# Patient Record
Sex: FEMALE | Race: WHITE | NOT HISPANIC OR LATINO
[De-identification: names, ages, dates, MRNs, and addresses within clinical notes are randomized per-mention and may not be internally consistent; named-entity substitution may affect disease eponyms.]

---

## 2017-01-09 ENCOUNTER — APPOINTMENT (OUTPATIENT)
Dept: OTOLARYNGOLOGY | Facility: CLINIC | Age: 57
End: 2017-01-09

## 2017-01-09 VITALS
WEIGHT: 185 LBS | HEART RATE: 64 BPM | BODY MASS INDEX: 31.58 KG/M2 | DIASTOLIC BLOOD PRESSURE: 90 MMHG | SYSTOLIC BLOOD PRESSURE: 135 MMHG | HEIGHT: 64 IN

## 2017-01-30 ENCOUNTER — APPOINTMENT (OUTPATIENT)
Dept: OTOLARYNGOLOGY | Facility: CLINIC | Age: 57
End: 2017-01-30

## 2017-01-30 VITALS
BODY MASS INDEX: 31.58 KG/M2 | DIASTOLIC BLOOD PRESSURE: 84 MMHG | HEIGHT: 64 IN | HEART RATE: 55 BPM | SYSTOLIC BLOOD PRESSURE: 142 MMHG | WEIGHT: 185 LBS

## 2017-06-12 ENCOUNTER — APPOINTMENT (OUTPATIENT)
Dept: OTOLARYNGOLOGY | Facility: CLINIC | Age: 57
End: 2017-06-12

## 2017-06-12 VITALS
HEART RATE: 69 BPM | BODY MASS INDEX: 31.58 KG/M2 | DIASTOLIC BLOOD PRESSURE: 92 MMHG | SYSTOLIC BLOOD PRESSURE: 144 MMHG | WEIGHT: 185 LBS | HEIGHT: 64 IN

## 2017-06-28 ENCOUNTER — APPOINTMENT (OUTPATIENT)
Dept: OTOLARYNGOLOGY | Facility: CLINIC | Age: 57
End: 2017-06-28

## 2017-06-28 VITALS
HEIGHT: 64 IN | WEIGHT: 185 LBS | HEART RATE: 74 BPM | BODY MASS INDEX: 31.58 KG/M2 | DIASTOLIC BLOOD PRESSURE: 98 MMHG | SYSTOLIC BLOOD PRESSURE: 137 MMHG

## 2017-07-17 ENCOUNTER — APPOINTMENT (OUTPATIENT)
Dept: OTOLARYNGOLOGY | Facility: CLINIC | Age: 57
End: 2017-07-17

## 2017-07-26 ENCOUNTER — APPOINTMENT (OUTPATIENT)
Dept: OTOLARYNGOLOGY | Facility: CLINIC | Age: 57
End: 2017-07-26

## 2017-07-26 VITALS
DIASTOLIC BLOOD PRESSURE: 89 MMHG | HEART RATE: 80 BPM | HEIGHT: 64 IN | WEIGHT: 185 LBS | SYSTOLIC BLOOD PRESSURE: 129 MMHG | BODY MASS INDEX: 31.58 KG/M2

## 2017-07-26 DIAGNOSIS — H81.11 BENIGN PAROXYSMAL VERTIGO, RIGHT EAR: ICD-10-CM

## 2017-07-26 DIAGNOSIS — R51 HEADACHE: ICD-10-CM

## 2017-07-26 DIAGNOSIS — J31.0 CHRONIC RHINITIS: ICD-10-CM

## 2017-08-01 ENCOUNTER — FORM ENCOUNTER (OUTPATIENT)
Age: 57
End: 2017-08-01

## 2017-08-02 ENCOUNTER — RESULT REVIEW (OUTPATIENT)
Age: 57
End: 2017-08-02

## 2017-08-02 ENCOUNTER — APPOINTMENT (OUTPATIENT)
Dept: MRI IMAGING | Facility: CLINIC | Age: 57
End: 2017-08-02

## 2017-08-02 ENCOUNTER — OUTPATIENT (OUTPATIENT)
Dept: OUTPATIENT SERVICES | Facility: HOSPITAL | Age: 57
LOS: 1 days | End: 2017-08-02
Payer: COMMERCIAL

## 2017-08-02 ENCOUNTER — APPOINTMENT (OUTPATIENT)
Dept: CT IMAGING | Facility: CLINIC | Age: 57
End: 2017-08-02
Payer: COMMERCIAL

## 2017-08-02 DIAGNOSIS — Z98.890 OTHER SPECIFIED POSTPROCEDURAL STATES: Chronic | ICD-10-CM

## 2017-08-02 DIAGNOSIS — H70.12 CHRONIC MASTOIDITIS, LEFT EAR: ICD-10-CM

## 2017-08-02 PROCEDURE — 70480 CT ORBIT/EAR/FOSSA W/O DYE: CPT

## 2017-08-02 PROCEDURE — 70551 MRI BRAIN STEM W/O DYE: CPT

## 2017-08-02 PROCEDURE — 70551 MRI BRAIN STEM W/O DYE: CPT | Mod: 26

## 2017-08-02 PROCEDURE — 70480 CT ORBIT/EAR/FOSSA W/O DYE: CPT | Mod: 26

## 2017-08-16 ENCOUNTER — MESSAGE (OUTPATIENT)
Age: 57
End: 2017-08-16

## 2017-08-23 ENCOUNTER — APPOINTMENT (OUTPATIENT)
Dept: OTOLARYNGOLOGY | Facility: CLINIC | Age: 57
End: 2017-08-23
Payer: COMMERCIAL

## 2017-08-23 PROCEDURE — 99214 OFFICE O/P EST MOD 30 MIN: CPT

## 2017-08-27 ENCOUNTER — FORM ENCOUNTER (OUTPATIENT)
Age: 57
End: 2017-08-27

## 2017-08-28 ENCOUNTER — OUTPATIENT (OUTPATIENT)
Dept: OUTPATIENT SERVICES | Facility: HOSPITAL | Age: 57
LOS: 1 days | End: 2017-08-28
Payer: COMMERCIAL

## 2017-08-28 ENCOUNTER — APPOINTMENT (OUTPATIENT)
Dept: MRI IMAGING | Facility: IMAGING CENTER | Age: 57
End: 2017-08-28
Payer: COMMERCIAL

## 2017-08-28 DIAGNOSIS — Z98.890 OTHER SPECIFIED POSTPROCEDURAL STATES: Chronic | ICD-10-CM

## 2017-08-28 DIAGNOSIS — H70.12 CHRONIC MASTOIDITIS, LEFT EAR: ICD-10-CM

## 2017-08-28 DIAGNOSIS — H92.12 OTORRHEA, LEFT EAR: ICD-10-CM

## 2017-08-28 PROCEDURE — 70551 MRI BRAIN STEM W/O DYE: CPT

## 2017-08-28 PROCEDURE — 70551 MRI BRAIN STEM W/O DYE: CPT | Mod: 26

## 2017-09-05 ENCOUNTER — APPOINTMENT (OUTPATIENT)
Dept: OTOLARYNGOLOGY | Facility: CLINIC | Age: 57
End: 2017-09-05
Payer: COMMERCIAL

## 2017-09-05 PROCEDURE — 99214 OFFICE O/P EST MOD 30 MIN: CPT

## 2017-09-11 ENCOUNTER — MESSAGE (OUTPATIENT)
Age: 57
End: 2017-09-11

## 2017-09-11 LAB — BACTERIA FLD CULT: ABNORMAL

## 2017-09-13 ENCOUNTER — APPOINTMENT (OUTPATIENT)
Dept: OTOLARYNGOLOGY | Facility: CLINIC | Age: 57
End: 2017-09-13

## 2017-09-20 ENCOUNTER — APPOINTMENT (OUTPATIENT)
Dept: OTOLARYNGOLOGY | Facility: CLINIC | Age: 57
End: 2017-09-20

## 2017-09-20 ENCOUNTER — APPOINTMENT (OUTPATIENT)
Dept: OTOLARYNGOLOGY | Facility: CLINIC | Age: 57
End: 2017-09-20
Payer: COMMERCIAL

## 2017-09-20 PROCEDURE — 99214 OFFICE O/P EST MOD 30 MIN: CPT | Mod: 25

## 2017-09-20 PROCEDURE — 92557 COMPREHENSIVE HEARING TEST: CPT

## 2017-09-20 PROCEDURE — 92567 TYMPANOMETRY: CPT

## 2017-09-27 ENCOUNTER — MESSAGE (OUTPATIENT)
Age: 57
End: 2017-09-27

## 2017-10-02 ENCOUNTER — OUTPATIENT (OUTPATIENT)
Dept: OUTPATIENT SERVICES | Facility: HOSPITAL | Age: 57
LOS: 1 days | End: 2017-10-02
Payer: COMMERCIAL

## 2017-10-02 VITALS
HEART RATE: 72 BPM | SYSTOLIC BLOOD PRESSURE: 130 MMHG | TEMPERATURE: 98 F | WEIGHT: 195.99 LBS | DIASTOLIC BLOOD PRESSURE: 86 MMHG | HEIGHT: 60 IN | RESPIRATION RATE: 16 BRPM

## 2017-10-02 DIAGNOSIS — Z98.890 OTHER SPECIFIED POSTPROCEDURAL STATES: Chronic | ICD-10-CM

## 2017-10-02 DIAGNOSIS — H92.12 OTORRHEA, LEFT EAR: ICD-10-CM

## 2017-10-02 DIAGNOSIS — I10 ESSENTIAL (PRIMARY) HYPERTENSION: ICD-10-CM

## 2017-10-02 DIAGNOSIS — E66.9 OBESITY, UNSPECIFIED: ICD-10-CM

## 2017-10-02 LAB
BUN SERPL-MCNC: 15 MG/DL — SIGNIFICANT CHANGE UP (ref 7–23)
CALCIUM SERPL-MCNC: 9.3 MG/DL — SIGNIFICANT CHANGE UP (ref 8.4–10.5)
CHLORIDE SERPL-SCNC: 101 MMOL/L — SIGNIFICANT CHANGE UP (ref 98–107)
CO2 SERPL-SCNC: 28 MMOL/L — SIGNIFICANT CHANGE UP (ref 22–31)
CREAT SERPL-MCNC: 0.75 MG/DL — SIGNIFICANT CHANGE UP (ref 0.5–1.3)
GLUCOSE SERPL-MCNC: 75 MG/DL — SIGNIFICANT CHANGE UP (ref 70–99)
HCT VFR BLD CALC: 42.7 % — SIGNIFICANT CHANGE UP (ref 34.5–45)
HGB BLD-MCNC: 13.8 G/DL — SIGNIFICANT CHANGE UP (ref 11.5–15.5)
MCHC RBC-ENTMCNC: 28.8 PG — SIGNIFICANT CHANGE UP (ref 27–34)
MCHC RBC-ENTMCNC: 32.3 % — SIGNIFICANT CHANGE UP (ref 32–36)
MCV RBC AUTO: 89 FL — SIGNIFICANT CHANGE UP (ref 80–100)
NRBC # FLD: 0 — SIGNIFICANT CHANGE UP
PLATELET # BLD AUTO: 253 K/UL — SIGNIFICANT CHANGE UP (ref 150–400)
PMV BLD: 10.1 FL — SIGNIFICANT CHANGE UP (ref 7–13)
POTASSIUM SERPL-MCNC: 3.6 MMOL/L — SIGNIFICANT CHANGE UP (ref 3.5–5.3)
POTASSIUM SERPL-SCNC: 3.6 MMOL/L — SIGNIFICANT CHANGE UP (ref 3.5–5.3)
RBC # BLD: 4.8 M/UL — SIGNIFICANT CHANGE UP (ref 3.8–5.2)
RBC # FLD: 13.1 % — SIGNIFICANT CHANGE UP (ref 10.3–14.5)
SODIUM SERPL-SCNC: 143 MMOL/L — SIGNIFICANT CHANGE UP (ref 135–145)
WBC # BLD: 7.12 K/UL — SIGNIFICANT CHANGE UP (ref 3.8–10.5)
WBC # FLD AUTO: 7.12 K/UL — SIGNIFICANT CHANGE UP (ref 3.8–10.5)

## 2017-10-02 PROCEDURE — 93010 ELECTROCARDIOGRAM REPORT: CPT

## 2017-10-02 RX ORDER — SODIUM CHLORIDE 9 MG/ML
1000 INJECTION, SOLUTION INTRAVENOUS
Qty: 0 | Refills: 0 | Status: DISCONTINUED | OUTPATIENT
Start: 2017-10-19 | End: 2017-10-27

## 2017-10-02 RX ORDER — SODIUM CHLORIDE 9 MG/ML
3 INJECTION INTRAMUSCULAR; INTRAVENOUS; SUBCUTANEOUS EVERY 8 HOURS
Qty: 0 | Refills: 0 | Status: DISCONTINUED | OUTPATIENT
Start: 2017-10-19 | End: 2017-10-27

## 2017-10-02 NOTE — H&P PST ADULT - PROBLEM SELECTOR PLAN 1
Left Myringotomy & Tube scheduled for 10/19/2017.  Pre-op instructions given. Pt verbalized understanding.  Pepcid given for GI prophylaxis.  Abnormal EKG - comparison EKG on file.  Medical clearance requested - including copy of Echo/Stress if available

## 2017-10-02 NOTE — H&P PST ADULT - HISTORY OF PRESENT ILLNESS
56yo female with medical h/o HTN and Cholesteatoma, left ear and had surgery 11 years ago. Pt reports over the period of 3-4 years she have been experiencing discharge from left ear. Pt presents today for presurgical evaluation for Left Myringotomy & Tube scheduled for 10/19/2017. 56yo female with medical h/o HTN with prior h/o Cholesteatoma, left ear and had surgery 11 years ago. Pt reports over the period of 3-4 years she have been experiencing discharge from left ear. Pt presents today for presurgical evaluation for Left Myringotomy & Tube scheduled for 10/19/2017.

## 2017-10-02 NOTE — H&P PST ADULT - FAMILY HISTORY
Mother  Still living? Yes, Estimated age: 81-90  Family history of hypertension, Age at diagnosis: Age Unknown

## 2017-10-02 NOTE — H&P PST ADULT - MUSCULOSKELETAL
negative detailed exam no joint warmth/normal strength/ROM intact/no calf tenderness/no joint swelling/no joint erythema

## 2017-10-02 NOTE — H&P PST ADULT - NEGATIVE ENMT SYMPTOMS
no vertigo/no sinus symptoms/no nasal congestion/no tinnitus/no nasal obstruction/no post-nasal discharge/no nasal discharge

## 2017-10-02 NOTE — H&P PST ADULT - PMH
Chronic serous otitis media    Hypercholesteremia    Hypertension    Obesity    Otorrhea of left ear

## 2017-10-02 NOTE — H&P PST ADULT - ABILITY TO HEAR (WITH HEARING AID OR HEARING APPLIANCE IF NORMALLY USED):
50 % hearing loss in left ear/Mildly to Moderately Impaired: difficulty hearing in some environments or speaker may need to increase volume or speak distinctly

## 2017-10-02 NOTE — H&P PST ADULT - LYMPHATIC
posterior cervical L/supraclavicular R/anterior cervical L/posterior cervical R/anterior cervical R/supraclavicular L

## 2017-10-02 NOTE — H&P PST ADULT - ENMT COMMENTS
left hearing difficulty and drainage from left ear left ear, hearing difficulty and discharge from left ear

## 2017-10-02 NOTE — H&P PST ADULT - RS GEN PE MLT RESP DETAILS PC
airway patent/respirations non-labored/good air movement/breath sounds equal/clear to auscultation bilaterally

## 2017-10-02 NOTE — H&P PST ADULT - NSANTHOSAYNRD_GEN_A_CORE
No. GREG screening performed.  STOP BANG Legend: 0-2 = LOW Risk; 3-4 = INTERMEDIATE Risk; 5-8 = HIGH Risk

## 2017-10-10 RX ORDER — METHYLPREDNISOLONE 4 MG/1
4 TABLET ORAL
Qty: 1 | Refills: 0 | Status: COMPLETED | COMMUNITY
Start: 2017-06-28 | End: 2017-10-10

## 2017-10-11 NOTE — ASU PATIENT PROFILE, ADULT - ABILITY TO HEAR (WITH HEARING AID OR HEARING APPLIANCE IF NORMALLY USED):
-s/p Impella-assisted Cath with BABAR x5 to LAD/RCA  -c/w DAPT, Statin, and BetaBlocker  -holding ACEI given LIN/CKD, discuss with Renal  - cards following 50 % hearing loss in left ear/Mildly to Moderately Impaired: difficulty hearing in some environments or speaker may need to increase volume or speak distinctly

## 2017-10-11 NOTE — ASU PATIENT PROFILE, ADULT - PSH
H/O myringotomy  s/p excision of osteoma, left myringotomy tube (2006)  H/O nasal polypectomy  2013 H/O myringotomy  s/p excision of osteoma, left myringotomy tube (2006)  H/O nasal polypectomy  2013  History of appendectomy  1976

## 2017-10-12 ENCOUNTER — OUTPATIENT (OUTPATIENT)
Dept: OUTPATIENT SERVICES | Facility: HOSPITAL | Age: 57
LOS: 1 days | Discharge: HOME | End: 2017-10-12
Payer: COMMERCIAL

## 2017-10-12 ENCOUNTER — RESULT REVIEW (OUTPATIENT)
Age: 57
End: 2017-10-12

## 2017-10-12 ENCOUNTER — TRANSCRIPTION ENCOUNTER (OUTPATIENT)
Age: 57
End: 2017-10-12

## 2017-10-12 ENCOUNTER — APPOINTMENT (OUTPATIENT)
Dept: OTOLARYNGOLOGY | Facility: HOSPITAL | Age: 57
End: 2017-10-12

## 2017-10-12 VITALS
RESPIRATION RATE: 16 BRPM | HEART RATE: 76 BPM | DIASTOLIC BLOOD PRESSURE: 63 MMHG | OXYGEN SATURATION: 100 % | SYSTOLIC BLOOD PRESSURE: 108 MMHG | TEMPERATURE: 98 F

## 2017-10-12 VITALS
HEIGHT: 60 IN | RESPIRATION RATE: 16 BRPM | TEMPERATURE: 98 F | WEIGHT: 195.99 LBS | HEART RATE: 61 BPM | DIASTOLIC BLOOD PRESSURE: 90 MMHG | SYSTOLIC BLOOD PRESSURE: 147 MMHG | OXYGEN SATURATION: 97 %

## 2017-10-12 DIAGNOSIS — H92.12 OTORRHEA, LEFT EAR: ICD-10-CM

## 2017-10-12 DIAGNOSIS — Z98.890 OTHER SPECIFIED POSTPROCEDURAL STATES: Chronic | ICD-10-CM

## 2017-10-12 DIAGNOSIS — Z90.49 ACQUIRED ABSENCE OF OTHER SPECIFIED PARTS OF DIGESTIVE TRACT: Chronic | ICD-10-CM

## 2017-10-12 LAB
GRAM STN WND: SIGNIFICANT CHANGE UP
SPECIMEN SOURCE: SIGNIFICANT CHANGE UP

## 2017-10-12 PROCEDURE — 69436 CREATE EARDRUM OPENING: CPT | Mod: 59,LT

## 2017-10-12 PROCEDURE — 88311 DECALCIFY TISSUE: CPT | Mod: 26

## 2017-10-12 PROCEDURE — 88305 TISSUE EXAM BY PATHOLOGIST: CPT | Mod: 26

## 2017-10-12 PROCEDURE — 69641 REVISE MIDDLE EAR & MASTOID: CPT | Mod: LT

## 2017-10-12 PROCEDURE — 88304 TISSUE EXAM BY PATHOLOGIST: CPT | Mod: 26

## 2017-10-12 PROCEDURE — 11402 EXC TR-EXT B9+MARG 1.1-2 CM: CPT | Mod: RT

## 2017-10-12 PROCEDURE — 92516 FACIAL NERVE FUNCTION TEST: CPT

## 2017-10-12 RX ORDER — ONDANSETRON 8 MG/1
4 TABLET, FILM COATED ORAL ONCE
Qty: 0 | Refills: 0 | Status: COMPLETED | OUTPATIENT
Start: 2017-10-12 | End: 2017-10-12

## 2017-10-12 RX ORDER — OXYCODONE AND ACETAMINOPHEN 5; 325 MG/1; MG/1
1 TABLET ORAL EVERY 6 HOURS
Qty: 0 | Refills: 0 | Status: DISCONTINUED | OUTPATIENT
Start: 2017-10-12 | End: 2017-10-12

## 2017-10-12 RX ORDER — ACETAMINOPHEN 500 MG
1000 TABLET ORAL ONCE
Qty: 0 | Refills: 0 | Status: COMPLETED | OUTPATIENT
Start: 2017-10-12 | End: 2017-10-12

## 2017-10-12 RX ORDER — FENTANYL CITRATE 50 UG/ML
25 INJECTION INTRAVENOUS
Qty: 0 | Refills: 0 | Status: DISCONTINUED | OUTPATIENT
Start: 2017-10-12 | End: 2017-10-12

## 2017-10-12 RX ORDER — SODIUM CHLORIDE 9 MG/ML
500 INJECTION, SOLUTION INTRAVENOUS
Qty: 0 | Refills: 0 | Status: DISCONTINUED | OUTPATIENT
Start: 2017-10-12 | End: 2017-10-27

## 2017-10-12 RX ADMIN — Medication 1000 MILLIGRAM(S): at 12:00

## 2017-10-12 RX ADMIN — Medication 400 MILLIGRAM(S): at 11:39

## 2017-10-12 RX ADMIN — FENTANYL CITRATE 25 MICROGRAM(S): 50 INJECTION INTRAVENOUS at 12:00

## 2017-10-12 RX ADMIN — FENTANYL CITRATE 25 MICROGRAM(S): 50 INJECTION INTRAVENOUS at 11:44

## 2017-10-12 RX ADMIN — SODIUM CHLORIDE 70 MILLILITER(S): 9 INJECTION, SOLUTION INTRAVENOUS at 11:40

## 2017-10-12 RX ADMIN — FENTANYL CITRATE 25 MICROGRAM(S): 50 INJECTION INTRAVENOUS at 12:15

## 2017-10-12 RX ADMIN — ONDANSETRON 4 MILLIGRAM(S): 8 TABLET, FILM COATED ORAL at 11:44

## 2017-10-12 RX ADMIN — FENTANYL CITRATE 25 MICROGRAM(S): 50 INJECTION INTRAVENOUS at 12:05

## 2017-10-12 NOTE — ASU DISCHARGE PLAN (ADULT/PEDIATRIC). - CONDITIONS AT DISCHARGE
Verbalizing good understanding of discharge instructions and medication review.Discharge criteria met.  Cleared for discharge home by anesthesia.  Escorted to entrance  discharged home. patient voided.

## 2017-10-12 NOTE — ASU DISCHARGE PLAN (ADULT/PEDIATRIC). - POST OP PHONE #
[phone]// [phone] pt. granted permission to leave message /and or speak with whoever answers the phone.

## 2017-10-12 NOTE — ASU DISCHARGE PLAN (ADULT/PEDIATRIC). - MEDICATION SUMMARY - MEDICATIONS TO TAKE
I will START or STAY ON the medications listed below when I get home from the hospital:    Percocet 5/325 oral tablet  -- 1 tab(s) by mouth every 6 hours MDD:4  -- Caution federal law prohibits the transfer of this drug to any person other  than the person for whom it was prescribed.  May cause drowsiness.  Alcohol may intensify this effect.  Use care when operating dangerous machinery.  This prescription cannot be refilled.  This product contains acetaminophen.  Do not use  with any other product containing acetaminophen to prevent possible liver damage.  Using more of this medication than prescribed may cause serious breathing problems.    -- Indication: For Post-op pain    amLODIPine 5 mg oral tablet  -- 1 tab(s) by mouth once a day in morning  -- Indication: For Home med    Cleocin HCl 300 mg oral capsule  -- 1 cap(s) by mouth 3 times a day   -- Finish all this medication unless otherwise directed by prescriber.  Medication should be taken with plenty of water.    -- Indication: For Post-op abx I will START or STAY ON the medications listed below when I get home from the hospital:    Percocet 5/325 oral tablet  -- 1 tab(s) by mouth every 6 hours MDD:4  -- Caution federal law prohibits the transfer of this drug to any person other  than the person for whom it was prescribed.  May cause drowsiness.  Alcohol may intensify this effect.  Use care when operating dangerous machinery.  This prescription cannot be refilled.  This product contains acetaminophen.  Do not use  with any other product containing acetaminophen to prevent possible liver damage.  Using more of this medication than prescribed may cause serious breathing problems.    -- Indication: For pain    amLODIPine 5 mg oral tablet  -- 1 tab(s) by mouth once a day in morning  -- Indication: For Home med    Cleocin HCl 300 mg oral capsule  -- 1 cap(s) by mouth 3 times a day   -- Finish all this medication unless otherwise directed by prescriber.  Medication should be taken with plenty of water.    -- Indication: For antibiotic

## 2017-10-13 LAB
SPECIMEN SOURCE: SIGNIFICANT CHANGE UP

## 2017-10-15 LAB — CULTURE - SURGICAL SITE: SIGNIFICANT CHANGE UP

## 2017-10-16 LAB — CULTURE - SURGICAL SITE: SIGNIFICANT CHANGE UP

## 2017-10-18 ENCOUNTER — APPOINTMENT (OUTPATIENT)
Dept: OTOLARYNGOLOGY | Facility: CLINIC | Age: 57
End: 2017-10-18
Payer: COMMERCIAL

## 2017-10-18 LAB
CULTURE - SURGICAL SITE: SIGNIFICANT CHANGE UP
SURGICAL PATHOLOGY STUDY: SIGNIFICANT CHANGE UP

## 2017-10-18 PROCEDURE — 99024 POSTOP FOLLOW-UP VISIT: CPT

## 2017-11-03 ENCOUNTER — MEDICATION RENEWAL (OUTPATIENT)
Age: 57
End: 2017-11-03

## 2017-11-08 ENCOUNTER — APPOINTMENT (OUTPATIENT)
Dept: OTOLARYNGOLOGY | Facility: CLINIC | Age: 57
End: 2017-11-08
Payer: COMMERCIAL

## 2017-11-08 VITALS
DIASTOLIC BLOOD PRESSURE: 83 MMHG | SYSTOLIC BLOOD PRESSURE: 121 MMHG | BODY MASS INDEX: 32.44 KG/M2 | HEART RATE: 77 BPM | WEIGHT: 190 LBS | HEIGHT: 64 IN

## 2017-11-08 PROCEDURE — 99024 POSTOP FOLLOW-UP VISIT: CPT

## 2017-11-09 LAB
FUNGUS SPEC QL CULT: SIGNIFICANT CHANGE UP

## 2017-11-14 ENCOUNTER — RESULT REVIEW (OUTPATIENT)
Age: 57
End: 2017-11-14

## 2017-11-14 LAB — BACTERIA FLD CULT: NORMAL

## 2017-11-20 ENCOUNTER — APPOINTMENT (OUTPATIENT)
Dept: OTOLARYNGOLOGY | Facility: CLINIC | Age: 57
End: 2017-11-20

## 2017-12-11 ENCOUNTER — APPOINTMENT (OUTPATIENT)
Dept: OTOLARYNGOLOGY | Facility: CLINIC | Age: 57
End: 2017-12-11
Payer: COMMERCIAL

## 2017-12-11 VITALS — HEIGHT: 64 IN | BODY MASS INDEX: 32.44 KG/M2 | WEIGHT: 190 LBS

## 2017-12-11 PROCEDURE — 99024 POSTOP FOLLOW-UP VISIT: CPT

## 2017-12-11 RX ORDER — CIPROFLOXACIN AND DEXAMETHASONE 3; 1 MG/ML; MG/ML
0.3-0.1 SUSPENSION/ DROPS AURICULAR (OTIC)
Qty: 1 | Refills: 4 | Status: DISCONTINUED | COMMUNITY
Start: 2017-10-18 | End: 2017-12-11

## 2017-12-11 RX ORDER — OFLOXACIN OTIC 3 MG/ML
0.3 SOLUTION AURICULAR (OTIC)
Qty: 10 | Refills: 0 | Status: DISCONTINUED | COMMUNITY
Start: 2016-09-01 | End: 2017-12-11

## 2017-12-19 ENCOUNTER — MEDICATION RENEWAL (OUTPATIENT)
Age: 57
End: 2017-12-19

## 2017-12-19 LAB — BACTERIA FLD CULT: NORMAL

## 2018-01-08 ENCOUNTER — APPOINTMENT (OUTPATIENT)
Dept: OTOLARYNGOLOGY | Facility: CLINIC | Age: 58
End: 2018-01-08
Payer: COMMERCIAL

## 2018-01-08 VITALS
HEIGHT: 62 IN | DIASTOLIC BLOOD PRESSURE: 82 MMHG | HEART RATE: 56 BPM | BODY MASS INDEX: 34.96 KG/M2 | SYSTOLIC BLOOD PRESSURE: 143 MMHG | WEIGHT: 190 LBS

## 2018-01-08 PROCEDURE — 92567 TYMPANOMETRY: CPT

## 2018-01-08 PROCEDURE — 92557 COMPREHENSIVE HEARING TEST: CPT

## 2018-01-08 PROCEDURE — 99024 POSTOP FOLLOW-UP VISIT: CPT

## 2018-01-31 LAB — ACID FAST STN FLD: NORMAL

## 2018-04-16 ENCOUNTER — APPOINTMENT (OUTPATIENT)
Dept: OTOLARYNGOLOGY | Facility: CLINIC | Age: 58
End: 2018-04-16
Payer: COMMERCIAL

## 2018-04-16 VITALS
SYSTOLIC BLOOD PRESSURE: 116 MMHG | HEART RATE: 57 BPM | BODY MASS INDEX: 33.66 KG/M2 | WEIGHT: 190 LBS | DIASTOLIC BLOOD PRESSURE: 64 MMHG | HEIGHT: 63 IN

## 2018-04-16 PROCEDURE — 99213 OFFICE O/P EST LOW 20 MIN: CPT

## 2018-04-16 RX ORDER — FLUTICASONE PROPIONATE 50 UG/1
50 SPRAY, METERED NASAL DAILY
Qty: 1 | Refills: 5 | Status: DISCONTINUED | COMMUNITY
Start: 2017-07-26 | End: 2018-04-16

## 2018-04-18 ENCOUNTER — EMERGENCY (EMERGENCY)
Facility: HOSPITAL | Age: 58
LOS: 1 days | Discharge: HOME | End: 2018-04-18
Attending: EMERGENCY MEDICINE
Payer: COMMERCIAL

## 2018-04-18 VITALS
DIASTOLIC BLOOD PRESSURE: 84 MMHG | RESPIRATION RATE: 17 BRPM | TEMPERATURE: 98 F | SYSTOLIC BLOOD PRESSURE: 145 MMHG | HEART RATE: 55 BPM | OXYGEN SATURATION: 97 %

## 2018-04-18 VITALS — WEIGHT: 190.04 LBS

## 2018-04-18 DIAGNOSIS — Z90.49 ACQUIRED ABSENCE OF OTHER SPECIFIED PARTS OF DIGESTIVE TRACT: Chronic | ICD-10-CM

## 2018-04-18 DIAGNOSIS — Z98.890 OTHER SPECIFIED POSTPROCEDURAL STATES: Chronic | ICD-10-CM

## 2018-04-18 PROCEDURE — 99283 EMERGENCY DEPT VISIT LOW MDM: CPT | Mod: 25

## 2018-04-18 PROCEDURE — 73130 X-RAY EXAM OF HAND: CPT | Mod: 26,RT

## 2018-04-18 PROCEDURE — 90715 TDAP VACCINE 7 YRS/> IM: CPT

## 2018-04-18 PROCEDURE — 99283 EMERGENCY DEPT VISIT LOW MDM: CPT

## 2018-04-18 PROCEDURE — 73130 X-RAY EXAM OF HAND: CPT

## 2018-04-18 PROCEDURE — 90471 IMMUNIZATION ADMIN: CPT

## 2018-04-18 RX ORDER — IBUPROFEN 200 MG
600 TABLET ORAL ONCE
Qty: 0 | Refills: 0 | Status: COMPLETED | OUTPATIENT
Start: 2018-04-18 | End: 2018-04-18

## 2018-04-18 RX ORDER — TETANUS TOXOID, REDUCED DIPHTHERIA TOXOID AND ACELLULAR PERTUSSIS VACCINE, ADSORBED 5; 2.5; 8; 8; 2.5 [IU]/.5ML; [IU]/.5ML; UG/.5ML; UG/.5ML; UG/.5ML
0.5 SUSPENSION INTRAMUSCULAR ONCE
Qty: 0 | Refills: 0 | Status: COMPLETED | OUTPATIENT
Start: 2018-04-18 | End: 2018-04-18

## 2018-04-18 RX ADMIN — Medication 600 MILLIGRAM(S): at 22:37

## 2018-04-18 RX ADMIN — TETANUS TOXOID, REDUCED DIPHTHERIA TOXOID AND ACELLULAR PERTUSSIS VACCINE, ADSORBED 0.5 MILLILITER(S): 5; 2.5; 8; 8; 2.5 SUSPENSION INTRAMUSCULAR at 22:36

## 2018-04-18 NOTE — ED ADULT NURSE NOTE - OBJECTIVE STATEMENT
57 y/o female presenting to ED c/o finger pain. Pt staes she cut her hand on glass on 4/15, and was seen in [facility] and started on bactrim. Pt found today to still have glass in finger. Finger is swollen, and painful to touch. Safety and comfort measures maintained.

## 2018-04-18 NOTE — ED PROVIDER NOTE - CONDUCTED A DETAILED DISCUSSION WITH PATIENT AND/OR GUARDIAN REGARDING, MDM
return to ED if symptoms worsen, persist or questions arise/need for outpatient follow-up return to ED if symptoms worsen, persist or questions arise/radiology results/need for outpatient follow-up

## 2018-04-18 NOTE — ED PROVIDER NOTE - SKIN, MLM
Skin normal color for race, warm, dry. Delayed healing lacs to distal 5th, flexor aspect of distal 4th, and superficial linear lacs to palm. No cellulitis. No purulence. No fluctuance. Compartments soft.

## 2018-04-18 NOTE — ED ADULT NURSE NOTE - PSH
H/O myringotomy  s/p excision of osteoma, left myringotomy tube (2006)  H/O nasal polypectomy  2013  History of appendectomy  1976

## 2018-04-18 NOTE — ED PROVIDER NOTE - MUSCULOSKELETAL, MLM
+TTP right distal 4th digit with swelling. FROM all joints of hand. +TTP right distal 4th digit with minimal swelling. no erythema/warmth to touch, or discharge. FROM all joints of hand.

## 2018-04-18 NOTE — ED PROVIDER NOTE - ATTENDING CONTRIBUTION TO CARE
57 yo F presents with pain to her R 4th finger. patient had glass break on her fingers, she sustained msall laceration to the finger pads of digits 2, 3, 4, and 5. she let these heal by secondary intention. she continued to feel pain in the 4th finger. went to urgent care yesterday who told her she had a 5mm piece of glass stuck ni finger 57 yo F presents with pain to her R 4th finger. patient had glass break on her fingers, she sustained small laceration to the finger pads of digits 2, 3, 4, and 5, four days ago. she let these heal by secondary intention. she continued to feel pain in the 4th finger. went to urgent care yesterday who told her she had a 5mm piece of glass stuck ni finger. she comes to get the piece of glass out of her finger.   PE with small (1cm) lacerations to the dorsal aspects of R hand fingers 2-5. these lacs all appear to be healing. 5th finger laceration irregularly healed. no evidence of infection, with no erythema, warmth to touch, or discharge. R 4th finger laceration has minimal swelling at the area, with TTP. there is no evidence of superinfection. full rom of all digits at joints.  ed workup shows xray demonstrating 5mm curvilinear denisty in R 4th finger, corresponding to where pt complains of pain and where she is TTP.   plastics Hand was consulted, Dr. bonner.   while awaiting call back from plastics, patient decided that she did not want to wait anymore and wanted to be discharged from the hospital. although we urged her to stay until plastics called back +/- came to the Er to remove the FB, she refused to stay any  longer as she was tired and wanted to go home to sleep. patient is AAOx4, full capacity to make all medical decisions, and understands that if she leaves there is a risk of severe disability and death. we instructed patient upon discharge to call plastic's office first thing in the morning to schedule an appointment in the office tomorrow to have the fb removed, and to continue her bactrim in the meantime.   as soon as patient was discharged, plastics Dr. Bonner returned phone call. we gave him pt info and he said that he would see patient in the morning in the office for further management      The patient was discharged from the ED in stable condition. All results of today's workup were discussed with the patient and all questions/concerns were addressed. All discharge instructions were thoroughly discussed with the patient, as well as important warning signs and new/ worsening symptoms which should necessitate patient's immediate return to the ED. The patient is agreeable with discharge and expresses full understanding of all instructions given.

## 2018-04-18 NOTE — ED PROVIDER NOTE - OBJECTIVE STATEMENT
59yo F pmh HLD, HTN presents for evaluation of retained FB in left ring finger s/p lac by broken glass 4/15. Evaluated at [facility] that day, irrigated and bacitracin applied but no xr or lac repair. Initiated on Bactrim. Today, pt sent by PMD to radiology with XR showing linear 5mm retained FB. Increased pain at site with numbness to distal digit with swelling. No fever. Right hand dominant. Tdap not up to date. 59yo F pmh HLD, HTN presents for evaluation of retained FB in left ring finger s/p lac by broken glass 4/15. Evaluated at [facility] [place] that day, irrigated and bacitracin applied but no xr or lac repair. Initiated on Bactrim. Today, pt sent by PMD to radiology with XR showing linear 5mm retained FB. Increased pain at site . No fever. Right hand dominant. Tdap not up to date.

## 2018-04-18 NOTE — ED PROVIDER NOTE - PROGRESS NOTE DETAILS
FB again visualized in right 4th distal finger. Hand consulted. FB again visualized in right 4th distal finger. Hand consulted. -Bertha Delgado PA-C Have not yet received call back from Dr Corbin. Pt does not wish to wait longer. Pt instructed to contact his office first thing tomorrow morning for same day rapid follow-up, and if unable for whatever reason needs to return to the ER to be evaluated by a Hand Surgeon. Will continue taking abx. -Bertha Delgado PA-C Spoke with Dr Corbin, will "absolutely" see patient in the office tomorrow, awaits her phone call. -Bertha Delgado PA-C

## 2018-04-20 ENCOUNTER — OUTPATIENT (OUTPATIENT)
Dept: OUTPATIENT SERVICES | Facility: HOSPITAL | Age: 58
LOS: 1 days | End: 2018-04-20
Payer: COMMERCIAL

## 2018-04-20 VITALS
HEART RATE: 54 BPM | RESPIRATION RATE: 16 BRPM | HEIGHT: 60 IN | SYSTOLIC BLOOD PRESSURE: 130 MMHG | DIASTOLIC BLOOD PRESSURE: 88 MMHG | WEIGHT: 197.98 LBS | TEMPERATURE: 99 F

## 2018-04-20 DIAGNOSIS — R06.83 SNORING: ICD-10-CM

## 2018-04-20 DIAGNOSIS — Z98.890 OTHER SPECIFIED POSTPROCEDURAL STATES: Chronic | ICD-10-CM

## 2018-04-20 DIAGNOSIS — M79.644 PAIN IN RIGHT FINGER(S): ICD-10-CM

## 2018-04-20 DIAGNOSIS — Z90.49 ACQUIRED ABSENCE OF OTHER SPECIFIED PARTS OF DIGESTIVE TRACT: Chronic | ICD-10-CM

## 2018-04-20 DIAGNOSIS — T14.90XA INJURY, UNSPECIFIED, INITIAL ENCOUNTER: ICD-10-CM

## 2018-04-20 LAB
BUN SERPL-MCNC: 10 MG/DL — SIGNIFICANT CHANGE UP (ref 7–23)
CALCIUM SERPL-MCNC: 9.1 MG/DL — SIGNIFICANT CHANGE UP (ref 8.4–10.5)
CHLORIDE SERPL-SCNC: 102 MMOL/L — SIGNIFICANT CHANGE UP (ref 98–107)
CO2 SERPL-SCNC: 28 MMOL/L — SIGNIFICANT CHANGE UP (ref 22–31)
CREAT SERPL-MCNC: 0.56 MG/DL — SIGNIFICANT CHANGE UP (ref 0.5–1.3)
GLUCOSE SERPL-MCNC: 76 MG/DL — SIGNIFICANT CHANGE UP (ref 70–99)
HCT VFR BLD CALC: 41.9 % — SIGNIFICANT CHANGE UP (ref 34.5–45)
HGB BLD-MCNC: 13.3 G/DL — SIGNIFICANT CHANGE UP (ref 11.5–15.5)
MCHC RBC-ENTMCNC: 27.9 PG — SIGNIFICANT CHANGE UP (ref 27–34)
MCHC RBC-ENTMCNC: 31.7 % — LOW (ref 32–36)
MCV RBC AUTO: 87.8 FL — SIGNIFICANT CHANGE UP (ref 80–100)
NRBC # FLD: 0 — SIGNIFICANT CHANGE UP
PLATELET # BLD AUTO: 231 K/UL — SIGNIFICANT CHANGE UP (ref 150–400)
PMV BLD: 10.1 FL — SIGNIFICANT CHANGE UP (ref 7–13)
POTASSIUM SERPL-MCNC: 3.9 MMOL/L — SIGNIFICANT CHANGE UP (ref 3.5–5.3)
POTASSIUM SERPL-SCNC: 3.9 MMOL/L — SIGNIFICANT CHANGE UP (ref 3.5–5.3)
RBC # BLD: 4.77 M/UL — SIGNIFICANT CHANGE UP (ref 3.8–5.2)
RBC # FLD: 13.2 % — SIGNIFICANT CHANGE UP (ref 10.3–14.5)
SODIUM SERPL-SCNC: 142 MMOL/L — SIGNIFICANT CHANGE UP (ref 135–145)
WBC # BLD: 5.47 K/UL — SIGNIFICANT CHANGE UP (ref 3.8–10.5)
WBC # FLD AUTO: 5.47 K/UL — SIGNIFICANT CHANGE UP (ref 3.8–10.5)

## 2018-04-20 PROCEDURE — 93010 ELECTROCARDIOGRAM REPORT: CPT

## 2018-04-20 NOTE — H&P PST ADULT - PSH
H/O myringotomy  s/p excision of osteoma, left myringotomy tube (2006) again in 2018  H/O nasal polypectomy  2013  History of appendectomy  1976

## 2018-04-20 NOTE — H&P PST ADULT - NEGATIVE ENMT SYMPTOMS
no nasal obstruction/no post-nasal discharge/no tinnitus/no nasal discharge/no vertigo/no sinus symptoms/no nasal congestion

## 2018-04-20 NOTE — H&P PST ADULT - BLOOD TRANSFUSION, PREVIOUS, PROFILE
Ochsner St. Anne tonsils. No lesions   -- Ears: External ears and TM normal bilaterally. Normal hearing and no drainage  -- Neck: Normal range of motion. Neck supple. No masses, trachea midline  -- Cardiac: Normal rate, regular rhythm and normal heart sounds  -- Pulmonary: Normal respiratory effort, breath sounds clear to auscultation  -- Abdominal: Soft, no tenderness. Normal bowel sounds. Normal liver edge  -- Musculoskeletal: Normal range of motion, no effusions. Joints stable   -- Neurological: No focal deficits. Showed good interaction with staff  -- Vascular: Posterior tibial, dorsalis pedis and radial pulses 2+ bilaterally    -- Lymphatics: No cervical or peripheral lymphadenopathy. No edema noted  -- Skin: Bumps on the left ear with adjacent ear redness noted    Emergency Room Course     Medications Given  -- cephALEXin 125 mg/5 mL suspension 125 mg (125 mg Oral Not Given 4/15/17 2045)   -- mupirocin 2 % ointment 22 g (22 g Topical (Top) Given 4/15/17 2042)   -- prednisoLONE 15 mg/5 mL syrup 15 mg (15 mg Oral Given 4/15/17 2041)     Diagnosis  -- The encounter diagnosis was Insect bite of ear, left, initial encounter.    Disposition and Plan  -- Disposition: home  -- Condition: stable  -- Follow-up: Parents to follow up with Brian Bowie, PhD in 1-2 days.  -- I advised the parent(s) that we have found no life threatening condition today  -- At this time, I believe the patient is clinically stable for discharge.   -- The parent(s) acknowledges that close follow up with a MD is required after all ER visits  -- The parent(s) agrees to comply with all instruction and direction given in the ER  -- The parent(s) agrees to return to ER if any symptoms reoccur     This note is dictated on Dragon Natural Speaking word recognition program.  There are word recognition mistakes that are occasionally missed on review.           Gurwinder Strauss MD  04/15/17 [id number]     no

## 2018-04-20 NOTE — H&P PST ADULT - ASSESSMENT
58 yrs old female with h/o HTN and hyperlipidemia, fell with a glass jar and cut her right ring and little finger with Glass still in the ring finger presents for preop eval to have removal of glass right ring finger, possible nerve repair on 4/23/18. 58 yrs old female with h/o HTN and hyperlipidemia, fell with a glass jar and cut her right ring and little finger with Glass presents for preop eval to have removal of glass right ring finger, possible nerve repair on 4/23/18.

## 2018-04-20 NOTE — H&P PST ADULT - RS GEN PE MLT RESP DETAILS PC
good air movement/respirations non-labored/clear to auscultation bilaterally/no rales/no rhonchi/no wheezes/airway patent/breath sounds equal

## 2018-04-20 NOTE — H&P PST ADULT - NSANTHOSAYNRD_GEN_A_CORE
No. GREG screening performed.  STOP BANG Legend: 0-2 = LOW Risk; 3-4 = INTERMEDIATE Risk; 5-8 = HIGH Risk/never tested

## 2018-04-20 NOTE — H&P PST ADULT - MUSCULOSKELETAL
details… detailed exam no joint swelling/no joint warmth/ROM intact/no joint erythema/decreased ROM/right ring and little finger, dsg D&I/no calf tenderness/decreased ROM due to pain

## 2018-04-20 NOTE — H&P PST ADULT - HISTORY OF PRESENT ILLNESS
58 yrs old female with h/o HTN and hyperlipidemia, fell with a glass jar and cut her right ring and little finger with Glass still in the ring finger presents for preop eval to have removal of glass right ring finger, possible nerve repair on 4/23/18.    Pt had  Left Myringotomy & Tube inserted on 10/19/2017. 58 yrs old female with h/o HTN and hyperlipidemia, fell with a glass jar and cut her right ring and little finger with Glass, presents for preop eval to have removal of glass right ring finger, possible nerve repair on 4/23/18.    Pt had  Left Myringotomy & Tube inserted on 10/19/2017.

## 2018-04-20 NOTE — H&P PST ADULT - PMH
Chronic serous otitis media    Hypercholesteremia    Hypertension    Obesity    Otorrhea of left ear    Trauma  cut with glass - right ring finger

## 2018-04-22 ENCOUNTER — TRANSCRIPTION ENCOUNTER (OUTPATIENT)
Age: 58
End: 2018-04-22

## 2018-04-23 ENCOUNTER — OUTPATIENT (OUTPATIENT)
Dept: OUTPATIENT SERVICES | Facility: HOSPITAL | Age: 58
LOS: 1 days | Discharge: HOME | End: 2018-04-23
Payer: COMMERCIAL

## 2018-04-23 VITALS
SYSTOLIC BLOOD PRESSURE: 111 MMHG | HEART RATE: 63 BPM | OXYGEN SATURATION: 96 % | DIASTOLIC BLOOD PRESSURE: 64 MMHG | TEMPERATURE: 98 F | RESPIRATION RATE: 12 BRPM

## 2018-04-23 VITALS
OXYGEN SATURATION: 96 % | RESPIRATION RATE: 15 BRPM | SYSTOLIC BLOOD PRESSURE: 152 MMHG | DIASTOLIC BLOOD PRESSURE: 78 MMHG | WEIGHT: 197.98 LBS | HEART RATE: 57 BPM | TEMPERATURE: 98 F | HEIGHT: 60 IN

## 2018-04-23 DIAGNOSIS — Z90.49 ACQUIRED ABSENCE OF OTHER SPECIFIED PARTS OF DIGESTIVE TRACT: Chronic | ICD-10-CM

## 2018-04-23 DIAGNOSIS — M79.644 PAIN IN RIGHT FINGER(S): ICD-10-CM

## 2018-04-23 DIAGNOSIS — Z98.890 OTHER SPECIFIED POSTPROCEDURAL STATES: Chronic | ICD-10-CM

## 2018-04-23 RX ORDER — ONDANSETRON 8 MG/1
1 TABLET, FILM COATED ORAL
Qty: 15 | Refills: 0
Start: 2018-04-23 | End: 2018-04-27

## 2018-04-23 RX ORDER — OXYCODONE AND ACETAMINOPHEN 5; 325 MG/1; MG/1
1 TABLET ORAL
Qty: 30 | Refills: 0
Start: 2018-04-23 | End: 2018-04-27

## 2018-04-23 NOTE — ASU DISCHARGE PLAN (ADULT/PEDIATRIC). - SPECIAL INSTRUCTIONS
See instruction sheet. See MD instruction sheet.    Do hand exercises on MD sheet     Ice 20 minutes on 20 minutes off

## 2018-04-23 NOTE — ASU DISCHARGE PLAN (ADULT/PEDIATRIC). - NURSING INSTRUCTIONS
Take stool softener as needed for possible constipation from pain meds     Don't take Tylenol with percocet as there is Tylenol in percocet

## 2018-04-23 NOTE — ASU DISCHARGE PLAN (ADULT/PEDIATRIC). - MEDICATION SUMMARY - MEDICATIONS TO CHANGE
I will SWITCH the dose or number of times a day I take the medications listed below when I get home from the hospital:    Percocet 5/325 oral tablet  -- 1 tab(s) by mouth every 6 hours MDD:4  -- Caution federal law prohibits the transfer of this drug to any person other  than the person for whom it was prescribed.  May cause drowsiness.  Alcohol may intensify this effect.  Use care when operating dangerous machinery.  This prescription cannot be refilled.  This product contains acetaminophen.  Do not use  with any other product containing acetaminophen to prevent possible liver damage.  Using more of this medication than prescribed may cause serious breathing problems.

## 2018-04-23 NOTE — BRIEF OPERATIVE NOTE - OPERATION/FINDINGS
See dictated report.  Findings -   Procedure - See dictated report.  Findings - R 4th finger laceration with embedded foreign body likely glass, digital nerve laceration  Procedure - Removal of foreign body See dictated report.  Findings - R 4th finger laceration with embedded foreign body likely glass, irreparable digital nerve laceration  Procedure - Removal of foreign body

## 2018-04-23 NOTE — ASU DISCHARGE PLAN (ADULT/PEDIATRIC). - MEDICATION SUMMARY - MEDICATIONS TO TAKE
I will START or STAY ON the medications listed below when I get home from the hospital:    Percocet 5/325 oral tablet  -- 1 tab(s) by mouth every 4 hours MDD:6 tabs  -- Caution federal law prohibits the transfer of this drug to any person other  than the person for whom it was prescribed.  May cause drowsiness.  Alcohol may intensify this effect.  Use care when operating dangerous machinery.  This prescription cannot be refilled.  This product contains acetaminophen.  Do not use  with any other product containing acetaminophen to prevent possible liver damage.  Using more of this medication than prescribed may cause serious breathing problems.    -- Indication: For Pain    Zofran ODT 4 mg oral tablet, disintegrating  -- 1 tab(s) by mouth 3 times a day, As Needed  -- Indication: For Nausea/vomiting    amLODIPine 5 mg oral tablet  -- 1 tab(s) by mouth once a day in morning  -- Indication: For Home med    Cleocin HCl 300 mg oral capsule  -- 1 cap(s) by mouth 3 times a day   -- Finish all this medication unless otherwise directed by prescriber.  Medication should be taken with plenty of water.    -- Indication: For Home med    Bactrim  -- Indication: For Home med

## 2018-04-23 NOTE — ASU DISCHARGE PLAN (ADULT/PEDIATRIC). - ACTIVITY LEVEL
no heavy lifting/elevate extremity no swimming, no hot tubs/elevate extremity/no tub baths/no heavy lifting

## 2018-04-23 NOTE — BRIEF OPERATIVE NOTE - PROCEDURE
<<-----Click on this checkbox to enter Procedure Foreign body removal  04/23/2018    Active  [id number]

## 2018-04-23 NOTE — ASU DISCHARGE PLAN (ADULT/PEDIATRIC). - NOTIFY
Numbness, color, or temperature change to extremity/Pain not relieved by Medications/Persistent Nausea and Vomiting/Fever greater than 101/Bleeding that does not stop/Swelling that continues Pain not relieved by Medications/Unable to Urinate/Fever greater than 101/Numbness, color, or temperature change to extremity/Swelling that continues/Persistent Nausea and Vomiting/Bleeding that does not stop/Inability to Tolerate Liquids or Foods

## 2018-04-23 NOTE — BRIEF OPERATIVE NOTE - PRE-OP DX
Digital nerve laceration, finger, initial encounter  04/23/2018    Active  Jenn Phillips  Foreign body (FB) in soft tissue  04/23/2018    Active  Jenn Phillips

## 2018-04-23 NOTE — ASU PREOPERATIVE ASSESSMENT, ADULT ([facility] ONLY) - TEACHING/LEARNING LEARNING PREFERENCES
written material/verbal instruction/skill demonstration/pictorial/group instruction/individual instruction

## 2018-04-24 ENCOUNTER — RESULT REVIEW (OUTPATIENT)
Age: 58
End: 2018-04-24

## 2018-04-24 PROCEDURE — 88300 SURGICAL PATH GROSS: CPT | Mod: 26

## 2018-05-16 LAB — SURGICAL PATHOLOGY STUDY: SIGNIFICANT CHANGE UP

## 2018-09-10 ENCOUNTER — APPOINTMENT (OUTPATIENT)
Dept: OTOLARYNGOLOGY | Facility: CLINIC | Age: 58
End: 2018-09-10
Payer: COMMERCIAL

## 2018-09-10 PROBLEM — T14.90XA INJURY, UNSPECIFIED, INITIAL ENCOUNTER: Chronic | Status: ACTIVE | Noted: 2018-04-20

## 2018-09-10 PROBLEM — H92.12 OTORRHEA, LEFT EAR: Chronic | Status: ACTIVE | Noted: 2017-10-02

## 2018-09-10 PROBLEM — E66.9 OBESITY, UNSPECIFIED: Chronic | Status: ACTIVE | Noted: 2017-10-02

## 2018-09-10 PROCEDURE — 99213 OFFICE O/P EST LOW 20 MIN: CPT

## 2018-10-01 ENCOUNTER — APPOINTMENT (OUTPATIENT)
Dept: OTOLARYNGOLOGY | Facility: CLINIC | Age: 58
End: 2018-10-01
Payer: COMMERCIAL

## 2018-10-01 VITALS
SYSTOLIC BLOOD PRESSURE: 128 MMHG | HEART RATE: 65 BPM | WEIGHT: 190 LBS | BODY MASS INDEX: 34.96 KG/M2 | DIASTOLIC BLOOD PRESSURE: 86 MMHG | HEIGHT: 62 IN

## 2018-10-01 PROCEDURE — 99213 OFFICE O/P EST LOW 20 MIN: CPT

## 2018-10-10 ENCOUNTER — MESSAGE (OUTPATIENT)
Age: 58
End: 2018-10-10

## 2018-10-29 ENCOUNTER — APPOINTMENT (OUTPATIENT)
Dept: OTOLARYNGOLOGY | Facility: CLINIC | Age: 58
End: 2018-10-29
Payer: COMMERCIAL

## 2018-10-29 PROCEDURE — 92557 COMPREHENSIVE HEARING TEST: CPT

## 2018-10-29 PROCEDURE — 92567 TYMPANOMETRY: CPT

## 2018-10-29 PROCEDURE — 99213 OFFICE O/P EST LOW 20 MIN: CPT | Mod: 25

## 2018-11-22 NOTE — ED ADULT NURSE NOTE - NS ED NURSE LEVEL OF CONSCIOUSNESS MENTAL STATUS
Awake/Cooperative/Alert

## 2019-01-09 NOTE — ED ADULT NURSE NOTE - WEIGHT METHOD
"  Sleep Consultation:    Date on this visit: 1/9/2019    Boogie Clark is sent by Maurilio Velasquez for a sleep consultation regarding hypertension, shortness of breath.    Primary Physician: Shabbir Agustin     Chief Complaint   Patient presents with     Sleep Problem     consult- set up for sleep study. [facility] request.      He was originally diagnosed with obstructive sleep apnea in 1995 at [facility]. He does not recall any numeric details of his polysomnogram. He was prescribed CPAP by Dr. Larose, but doesn't recall the pressure setting. His original complaints of witnessed apneas, snoring and daytime sleepiness all improved with CPAP therapy.     He was seen by Dr. Mclean in 2012 and had gained 100 pounds since his original polysomnogram \"always tired\" and \"likes to sleep.\"     Sleep study [facility] 3/2/2012 (298#) - AHI 76. RDI 86, low )2 80%, PLMI 6. CPAP 12 cm recommended.     Overall, he rates the experience with PAP as 10 (0 poor, 10 great). The mask is comfortable.    The mask is leaking at his where the hose connects to the mask.  The mask is leaking 7 nights per week.  He is not snoring with the mask on. He is not having gasp arousals.  He is not having significant oral/nasal dryness. The pressure is comfortable.     His PAP interface is Nasal Mask.    Bedtime is typically 2200. Usually it takes about 5 min minutes to fall asleep with the mask on. Wake time is typically 0700.  Patient is using PAP therapy 7 hours per night. The patient is usually getting 8 hours of sleep per night.    He does feel rested in the morning.    Total score - [place]: 8 (1/9/2019  8:00 AM)    Auto-PAP 12-20 cm download:  Average use 9 hours 50 minutes/day.  30 total days of use.  0 nonuse days. 100% days with >4 hours use.  Large Leak 2m 50s /night average. CPAP 90% pressure 13.3cm. AHI 1.4     2 LPM. He uses it at night only     Boogie goes to bed at 9-10 PM during the week. He gets up at 7:30 AM. He falls " asleep in 5 minutes.  Boogie denies difficulty falling asleep.  He wakes up 2-4 times a night and has trouble falling back to sleep 1-2/week. He uses melatonin to help him stay asleep.  Boogie wakes up to go to the bathroom.  On weekends, schedule is similar.  Patient gets an average of 8 hours of sleep per night.     Patient's wife does leave the TV on 1 hours.     Patient sleeps on his side. He has rare morning headaches.     His legs bounce, can't stop moving. He takes 'rest-ful legs' an over-the-counter medication which helps. He also gets symptoms during the day.     Boogie denies any sleep walking, sleep talking and dream enactment.    Boogie has gained 0-5 pounds since last sleep study 2012.     Boogie naps 1 times per week for 60 minutes on PAP. He takes frequent inadvertant naps.  He denies dozing while driving.  He uses 2 cups/day of coffee.    Component      Latest Ref Rng & Units 11/28/2018   pH Arterial      7.35 - 7.45 pH 7.46 (H)   pCO2 Arterial      35 - 45 mm Hg 32 (L)   PO2 Arterial      80 - 105 mm Hg 57 (L)   Bicarbonate Arterial      21 - 28 mmol/L 23   FIO2       21   Oxyhemoglobin Arterial      92 - 100 % 88 (L)   Base Excess Art      mmol/L 0.0   Iron      35 - 180 ug/dL 40   Iron Binding Cap      240 - 430 ug/dL 329   Iron Saturation Index      15 - 46 % 12 (L)   Ferritin      26 - 388 ng/mL 59         Recent Labs   Lab Test 01/08/19  0930 11/28/18  1641    142   POTASSIUM 4.0 3.9   CHLORIDE 112* 110*   CO2 26 25   ANIONGAP 6 8   GLC 94 106*   BUN 16 12   CR 0.98 0.91   GRACIELA 9.0 8.8     CBC RESULTS:   Recent Labs   Lab Test 11/28/18  1641   WBC 10.3   RBC 4.42   HGB 13.4   HCT 40.4   MCV 91   MCH 30.3   MCHC 33.2   RDW 13.3             Allergies:    Allergies   Allergen Reactions     Penicillins        Medications:    Current Outpatient Medications   Medication Sig Dispense Refill     aspirin 81 MG tablet Take  by mouth daily.       ATORVASTATIN CALCIUM PO Take 40 mg by mouth daily        fluticasone (FLONASE) 50 MCG/ACT nasal spray Spray 1-2 sprays into both nostrils daily 1 g 11     furosemide (LASIX) 40 MG tablet Take 1 tablet (40 mg) by mouth daily 90 tablet 3     losartan (COZAAR) 25 MG tablet Take 1 tablet (25 mg) by mouth daily 30 tablet 5     MELATONIN PO        Multiple Vitamin (MULTI-VITAMIN) per tablet Take  by mouth daily.       order for DME Equipment being ordered: Oxygen concentrator and portability  Date of order 05/10/18  Dosage of concentration - 2 liters per minute   Route of administration - Nasal cannula  Frequency of use - continuously  Duration of need - lifetime   Shabbir Agustin MD is the health care provider     _________________________________________  npi [id number] 05/10/18 1 Device 0     order for DME Equipment being ordered: oxygen concentrator - [facility] home oxygen 1 Device 0     order for DME Equipment being ordered: oxygen concentrator     Oxygen at 3 liters while at rest is at 97 %.  Oxygen at 3 liters walking is at 97 %.     Without oxygen at rest is at 91 %.  Without oxygen while walking is at 87 %.    From today's office visit 1 Device 0       Problem List:  Patient Active Problem List    Diagnosis Date Noted     Personal history of malignant neoplasm of bladder 05/20/2013     Priority: High     Bladder cancer Grade 3 non-invasive, Stage ta, s/p turbt on 5/2013. He received 2 courses of BCG therapy.       Iron deficiency anemia 01/02/2019     Priority: Medium     Pulmonary hypertension (H) 06/27/2018     Priority: Medium     Due to COPD and COCO  [facility] 8/2/17: RA 7. PAW 13, RA 7, PA 50/24, RV 52/8, CO 6.4,   C 12/26/2018: PCWP 13, RA 15. PA 33/22, RV 65/15, CO 6.14,        Morbid obesity with BMI of 45.0-49.9, adult (H) 05/30/2018     Priority: Medium     Oxygen dependent 05/01/2018     Priority: Medium     2 LPM. He uses it at night only        History of smoking 12/08/2016     Priority: Medium     Benign essential hypertension 12/03/2015     Priority:  Medium     Restrictive lung disease 11/12/2015     Priority: Medium     PFTS 2017- forced vital capacity of 2.44 L which is 56% predicted, FEV1 of 2 L which is 60% predicted, ratio increased at 82, total lung capacity decreased at 64% predicted, Diffusion capacity decreased at 53% predicted,  after bronchodilator there is no significant response. Findings are consistent with moderate restrictive ventilatory defect and moderate diffusion defect not corrected for hemoglobin       COCO (obstructive sleep apnea) 05/10/2013     Priority: Medium     Diagnosed 1995       Hyperlipidemia with target LDL less than 100 05/10/2013     Priority: Medium     Peripheral vascular disease (H) 07/19/2012     Priority: Medium     Angioplasty of bilateral superficial femoral arteries at [facility] 2008  Patent lower extremity arteries by [facility] in 2017.        DDD (degenerative disc disease), cervical 01/07/2019     Priority: Low     PAC (premature atrial contraction) 01/07/2019     Priority: Low     [facility] 5/2018 - PACs, PVC, SVT up to 17 beat run       Low testosterone 08/18/2018     Priority: Low     Bilateral lower extremity edema 05/01/2018     Priority: Low     Sigmoid diverticulosis 08/05/2014     Priority: Low     Due again in 2022 for colonoscopy        Advanced directives, counseling/discussion 07/19/2012     Priority: Low     Patient states has Advance Directive and will bring in a copy to clinic. 7/19/2012          Colon polyp 07/19/2012     Priority: Low     Colonoscopy at 52 yo  Repeat at 67 yo  Had at 55yo          Past Medical/Surgical History:  Past Medical History:   Diagnosis Date     Anal fissure 8/17/2012    Causing rectal bleeding      Cellulitis of breast of male 08/05/2014    INCISION DRAINAGE LEFT BREAST ABSCESS (1027) 8/7/2014      Kidney stone 4/17/2013     Pneumonia 02/11/2018    hospitalized in [place] on 2/11/2018 for bilateral pneumonia and COPD exacerbation     Past Surgical History:   Procedure  Laterality Date     ANGIOPLASTY      angioplasty at abdominla aortic bifurcation in common illiac bilaterally     ANKLE SURGERY  2011    fracture in [facility]     BIOPSY  2013     BLADDER SURGERY  2013    CYSTOSCOPY, TRANSURETHRAL RESECTION OF BLADDER TUMOR, INSTILLATION OF MITOMYCIN 2013      COLONOSCOPY       CYSTOSCOPY      several times secondary to dx. of bladder cancer     ORTHOPEDIC SURGERY      surgery on right ankle to repair break     TONSILLECTOMY  1955     VASCULAR SURGERY      angoplasty for pad     VASECTOMY         Social History:  Social History     Socioeconomic History     Marital status:      Spouse name: Not on file     Number of children: Not on file     Years of education: Not on file     Highest education level: Not on file   Social Needs     Financial resource strain: Not on file     Food insecurity - worry: Not on file     Food insecurity - inability: Not on file     Transportation needs - medical: Not on file     Transportation needs - non-medical: Not on file   Occupational History     Occupation: Retired machinest, [employer]    Tobacco Use     Smoking status: Former Smoker     Types: Cigarettes     Start date: 1964     Last attempt to quit: 10/1/2016     Years since quittin.2     Smokeless tobacco: Never Used     Tobacco comment: already did   Substance and Sexual Activity     Alcohol use: Yes     Alcohol/week: 0.0 oz     Comment: moderate once a week couple of beers     Drug use: No     Sexual activity: Yes     Partners: Female     Birth control/protection: Surgical   Other Topics Concern     Parent/sibling w/ CABG, MI or angioplasty before 65F 55M? No      Service No     Blood Transfusions No     Caffeine Concern No     Occupational Exposure No     Hobby Hazards No     Sleep Concern No     Stress Concern No     Weight Concern No     Special Diet No     Back Care No     Exercise No     Bike Helmet No     Seat Belt Yes      "Self-Exams Yes   Social History Narrative     Not on file       Family History:  Family History   Problem Relation Age of Onset     Breast Cancer Mother      Vascular Disease Father         brain anurysm     Cancer Maternal Grandmother      Diabetes No family hx of      Colon Cancer No family hx of      10 point ROS negative with exceptions noted above and below.   Runny nose, lower extremities edema, orthopnea, dyspnea on exertion, shortness of breath at rest, dry cough, constipation.     Physical Examination:  Vitals: /79   Pulse 79   Ht 1.803 m (5' 11\")   Wt 136.1 kg (300 lb)   SpO2 90%   BMI 41.84 kg/m    BMI= Body mass index is 41.84 kg/m .    Neck Cir (cm): 50 cm    [place] Total Score 1/9/2019   Total score - [place] 8       ASIF Total Score: 2 (01/09/19 0800)    GENERAL APPEARANCE: healthy, alert and no distress  EYES: Eyes grossly normal to inspection and conjunctivae and sclerae normal  HENT: ear canals and TM's normal, nose and mouth without ulcers or lesions and oropharynx crowded  NECK: no adenopathy  RESP: lungs clear to auscultation - no rales, rhonchi or wheezes  CV: regular rates and rhythm  ABDOMEN: soft, nontender, without hepatosplenomegaly or masses and obese  MS: edema garments on  NEURO: Normal strength and tone, mentation intact, speech normal and cranial nerves 2-12 intact  PSYCH: mentation appears normal and affect normal/bright  Mallampati Class: III.  Tonsillar Stage: 0  surgically removed.    Impression/Plan:    Severe obstructive sleep apnea  Comorbid  restrictive lung disease, chronic respiratory failure on O2, pulmonary hypertension, hypertension.   Tolerating PAP well with good AHI on download. Currents symptoms of mild daytime sleepiness (ESS8), frequent awakenings/nocturia, rare morning headaches. Contemplating bariatric surgery  -check overnoc oximetry at home  -Polysomnogram with TCM and usual o2 2 LPM and all night titration of PAP if needed starting at 12 " cm    Restless leg syndrome   Low iron levels may be implicated  Currently managed with over-the-counter medication effectively.       Librado Morrell     CC: Vamsi Curry Reuben          stated

## 2019-01-14 NOTE — ASU DISCHARGE PLAN (ADULT/PEDIATRIC). - LAUNCH MEDICATION RECONCILIATION
Verified Results  Fecal Occult Blood, Tube Test 25Nov2018 12:01AM MG MONTIEL   [Nov 27, 2018 7:33AM MG MONTIEL]  arely  stool test no blood  normal copy sent to [facility]     Test Name Result Flag Reference   OCCULT BLOOD, TUBE TEST NEGATIVE  NEGATIVE       
Verified Results  XR SHOULDER JESS 3V 27Nov2018 10:30AM MG MONTIEL   [Nov 27, 2018 2:22PM TIANAMG HERNANDEZ]  both shoulders x rays were good  copy sent to [facility]     Test Name Result Flag Reference   XR SHOULDER JESS 3V (Report)     Accession #    [id number]    EXAM: Bilateral shoulder three views    Indication bilateral shoulder pain lasting two weeks    No prior     Right shoulder findings and IMPRESSION:   Normal.    Left shoulder findings and IMPRESSION:   Normal.    **** F I N A L ****    Transcribed By: RICKY   11/27/18 10:49 am    Dictated By:      LYNNE GARAY MD    Electronically Reviewed and Approved By:      LYNNE GARAY MD 11/27/18 10:50 am     XR CHEST 2V 27Nov2018 10:30AM MG MONTIEL   [Nov 27, 2018 2:22PM TIANAMG HERNANDEZ]  old scar on lung but chest ortherwise is okay  copy sent to [facility]     Test Name Result Flag Reference   XR CHEST PA, LATERAL 2V (Report)     Accession #    [id number]    EXAM: XR CHEST 2V    CLINICAL INDICATION: Hypertension. Chest pain for two weeks.    Two views    Prior chest 02/06/2017 describing no acute cardiopulmonary findings. Old granulomatous   calcification.    Presently, normal heart size. No adenopathy. Calcified lymph node within the aorticopulmonary   window. Calcified granulomata at the left base. No infiltrates or effusions. Azygos fissure   as a normal variant.    IMPRESSION: Old granulomatous disease. Nothing acute.    **** F I N A L ****    Transcribed By: RICKY   11/27/18 10:50 am    Dictated By:      TERA GURROLA MD    Electronically Reviewed and Approved By:      TERA GURROLA MD 11/27/18 10:51 am       
<<-----Click here for Discharge Medication Review

## 2019-01-28 LAB — BACTERIA FLD CULT: ABNORMAL

## 2019-02-27 ENCOUNTER — APPOINTMENT (OUTPATIENT)
Dept: OTOLARYNGOLOGY | Facility: CLINIC | Age: 59
End: 2019-02-27
Payer: COMMERCIAL

## 2019-02-27 VITALS
SYSTOLIC BLOOD PRESSURE: 115 MMHG | DIASTOLIC BLOOD PRESSURE: 77 MMHG | BODY MASS INDEX: 31.28 KG/M2 | HEIGHT: 62 IN | WEIGHT: 170 LBS

## 2019-02-27 PROCEDURE — 99213 OFFICE O/P EST LOW 20 MIN: CPT

## 2019-02-27 RX ORDER — CIPROFLOXACIN AND DEXAMETHASONE 3; 1 MG/ML; MG/ML
0.3-0.1 SUSPENSION/ DROPS AURICULAR (OTIC) TWICE DAILY
Qty: 1 | Refills: 0 | Status: DISCONTINUED | COMMUNITY
Start: 2018-10-05 | End: 2019-02-27

## 2019-02-27 RX ORDER — FLUTICASONE FUROATE 27.5 UG/1
27.5 SPRAY, METERED NASAL DAILY
Qty: 1 | Refills: 5 | Status: DISCONTINUED | COMMUNITY
Start: 2017-06-12 | End: 2019-02-27

## 2019-02-27 RX ORDER — CLINDAMYCIN HYDROCHLORIDE 300 MG/1
300 CAPSULE ORAL
Qty: 21 | Refills: 0 | Status: DISCONTINUED | COMMUNITY
Start: 2018-09-10 | End: 2019-02-27

## 2019-02-27 RX ORDER — AMLODIPINE BESYLATE 5 MG/1
5 TABLET ORAL
Qty: 30 | Refills: 0 | Status: DISCONTINUED | COMMUNITY
Start: 2017-02-06 | End: 2019-02-27

## 2019-02-27 RX ORDER — VENLAFAXINE HYDROCHLORIDE 37.5 MG/1
37.5 CAPSULE, EXTENDED RELEASE ORAL
Qty: 30 | Refills: 0 | Status: DISCONTINUED | COMMUNITY
Start: 2017-05-07 | End: 2019-02-27

## 2019-02-27 RX ORDER — AZELASTINE HYDROCHLORIDE 205.5 UG/1
0.15 SPRAY, METERED NASAL
Qty: 2 | Refills: 10 | Status: DISCONTINUED | COMMUNITY
Start: 2017-07-26 | End: 2019-02-27

## 2019-02-27 NOTE — HISTORY OF PRESENT ILLNESS
[M & T] : myringotomy tube [Mastoid] : mastoid surgery [de-identified] : 59 yr old returns for follow up - hx of left MT-  no c/o any drainage from ear or hearing change.  c/o left neck (behind left ear) constant x 3 months (pain level 4).  No c/o dizziness.  No fever or headaches -

## 2019-02-27 NOTE — PHYSICAL EXAM
[Midline] : trachea located in midline position [Normal] : orientation to person, place, and time: normal [de-identified] : left tube open and dry

## 2019-03-25 ENCOUNTER — APPOINTMENT (OUTPATIENT)
Dept: OPHTHALMOLOGY | Facility: CLINIC | Age: 59
End: 2019-03-25
Payer: SELF-PAY

## 2019-03-25 PROCEDURE — 92004 COMPRE OPH EXAM NEW PT 1/>: CPT

## 2019-03-25 PROCEDURE — 92134 CPTRZ OPH DX IMG PST SGM RTA: CPT

## 2019-03-28 ENCOUNTER — MESSAGE (OUTPATIENT)
Age: 59
End: 2019-03-28

## 2019-05-09 ENCOUNTER — MESSAGE (OUTPATIENT)
Age: 59
End: 2019-05-09

## 2019-05-15 ENCOUNTER — APPOINTMENT (OUTPATIENT)
Dept: OTOLARYNGOLOGY | Facility: CLINIC | Age: 59
End: 2019-05-15
Payer: COMMERCIAL

## 2019-05-15 VITALS
HEART RATE: 69 BPM | WEIGHT: 180 LBS | SYSTOLIC BLOOD PRESSURE: 127 MMHG | DIASTOLIC BLOOD PRESSURE: 80 MMHG | HEIGHT: 62 IN | BODY MASS INDEX: 33.13 KG/M2

## 2019-05-15 DIAGNOSIS — H66.91 OTITIS MEDIA, UNSPECIFIED, RIGHT EAR: ICD-10-CM

## 2019-05-15 DIAGNOSIS — J34.2 DEVIATED NASAL SEPTUM: ICD-10-CM

## 2019-05-15 PROCEDURE — 92557 COMPREHENSIVE HEARING TEST: CPT

## 2019-05-15 PROCEDURE — 92511 NASOPHARYNGOSCOPY: CPT

## 2019-05-15 PROCEDURE — 99214 OFFICE O/P EST MOD 30 MIN: CPT | Mod: 25

## 2019-05-15 PROCEDURE — 69433 CREATE EARDRUM OPENING: CPT | Mod: RT

## 2019-05-15 PROCEDURE — 92567 TYMPANOMETRY: CPT

## 2019-05-15 RX ORDER — SULFACETAMIDE SODIUM AND PREDNISOLONE SODIUM PHOSPHATE 100; 2.3 MG/ML; MG/ML
10-0.23 SOLUTION/ DROPS OPHTHALMIC
Qty: 1 | Refills: 2 | Status: COMPLETED | COMMUNITY
Start: 2018-01-08 | End: 2019-05-15

## 2019-05-15 RX ORDER — SULFACETAMIDE SODIUM AND PREDNISOLONE SODIUM PHOSPHATE 100; 2.3 MG/ML; MG/ML
10-0.23 SOLUTION/ DROPS OPHTHALMIC
Qty: 1 | Refills: 3 | Status: COMPLETED | COMMUNITY
Start: 2018-09-10 | End: 2019-05-15

## 2019-05-22 ENCOUNTER — FORM ENCOUNTER (OUTPATIENT)
Age: 59
End: 2019-05-22

## 2019-05-23 ENCOUNTER — APPOINTMENT (OUTPATIENT)
Dept: CT IMAGING | Facility: IMAGING CENTER | Age: 59
End: 2019-05-23
Payer: COMMERCIAL

## 2019-05-23 ENCOUNTER — OUTPATIENT (OUTPATIENT)
Dept: OUTPATIENT SERVICES | Facility: HOSPITAL | Age: 59
LOS: 1 days | End: 2019-05-23
Payer: COMMERCIAL

## 2019-05-23 DIAGNOSIS — Z90.49 ACQUIRED ABSENCE OF OTHER SPECIFIED PARTS OF DIGESTIVE TRACT: Chronic | ICD-10-CM

## 2019-05-23 DIAGNOSIS — J31.0 CHRONIC RHINITIS: ICD-10-CM

## 2019-05-23 DIAGNOSIS — Z98.890 OTHER SPECIFIED POSTPROCEDURAL STATES: Chronic | ICD-10-CM

## 2019-05-23 DIAGNOSIS — H66.91 OTITIS MEDIA, UNSPECIFIED, RIGHT EAR: ICD-10-CM

## 2019-05-23 PROCEDURE — 70486 CT MAXILLOFACIAL W/O DYE: CPT | Mod: 26

## 2019-05-23 PROCEDURE — 70486 CT MAXILLOFACIAL W/O DYE: CPT

## 2019-06-04 NOTE — PROCEDURE
[Topical Lidocaine] : topical lidocaine [Oxymetazoline HCl] : oxymetazoline HCl [Flexible Endoscope] : examined with the flexible endoscope [Deviated to the Lt] : deviated to the left [Serial Number: ___] : Serial Number: [unfilled] [Normal] : the nasopharynx was normal [Same] : same as the Pre Op Dx. [] : M & T [FreeTextEntry3] : mild erythema [FreeTextEntry1] : right OME [FreeTextEntry4] : phenol [FreeTextEntry6] : Using microscope, phenol applied to posterior inferior quadrant - incision made - fluid evacuated and paparella #1 tube inserted without incident.  Patient tolerated procedure well.\par

## 2019-06-04 NOTE — HISTORY OF PRESENT ILLNESS
[de-identified] : 58 y/o female here for f/u for hearing loss. Pt w/ h/o of left MT. Pt c/o of right ear pain ( 7/10) and drainage. pt went to [facility] and was prescribed nasal spray and antibiotic (Z-pack) without any improvement.  Spoke wit Dr Orlando last week - also took medrol does pack - did not help .  Also reporting left otorrhea since october??

## 2019-06-17 ENCOUNTER — APPOINTMENT (OUTPATIENT)
Dept: OTOLARYNGOLOGY | Facility: CLINIC | Age: 59
End: 2019-06-17
Payer: COMMERCIAL

## 2019-06-17 VITALS
BODY MASS INDEX: 34.93 KG/M2 | HEIGHT: 61 IN | SYSTOLIC BLOOD PRESSURE: 120 MMHG | WEIGHT: 185 LBS | HEART RATE: 54 BPM | DIASTOLIC BLOOD PRESSURE: 79 MMHG

## 2019-06-17 PROCEDURE — 99214 OFFICE O/P EST MOD 30 MIN: CPT

## 2019-06-17 RX ORDER — METHYLPREDNISOLONE 4 MG/1
4 TABLET ORAL
Qty: 1 | Refills: 0 | Status: DISCONTINUED | COMMUNITY
Start: 2019-05-09 | End: 2019-06-17

## 2019-06-17 RX ORDER — OFLOXACIN OTIC 3 MG/ML
0.3 SOLUTION AURICULAR (OTIC) 3 TIMES DAILY
Qty: 1 | Refills: 1 | Status: DISCONTINUED | COMMUNITY
Start: 2019-05-15 | End: 2019-06-17

## 2019-06-17 RX ORDER — SODIUM PICOSULFATE, MAGNESIUM OXIDE, AND ANHYDROUS CITRIC ACID 10; 3.5; 12 MG/16.2G; G/16.2G; G/16.2G
10-3.5-12 POWDER, METERED ORAL
Qty: 1 | Refills: 0 | Status: DISCONTINUED | COMMUNITY
Start: 2017-06-26 | End: 2019-06-17

## 2019-06-17 NOTE — PHYSICAL EXAM
[Midline] : trachea located in midline position [Normal] : orientation to person, place, and time: normal [de-identified] : left tube open  - otorrhea suctioned for culture - right tube occluded but ME normal

## 2019-06-17 NOTE — HISTORY OF PRESENT ILLNESS
[de-identified] : 59 year old female follow up left otalgia, otorrhea.  STates left otalgia, bloody otorrhea for the past 10 days.  States fever, nauseas, pulsating left cheek, pain in left side of head, face.  Denies change in hearing

## 2019-06-17 NOTE — REASON FOR VISIT
[Subsequent Evaluation] : a subsequent evaluation for [Other: _____] : [unfilled] [FreeTextEntry2] : follow up left otalgia, otorrhea

## 2019-06-22 ENCOUNTER — FORM ENCOUNTER (OUTPATIENT)
Age: 59
End: 2019-06-22

## 2019-06-23 ENCOUNTER — APPOINTMENT (OUTPATIENT)
Dept: MRI IMAGING | Facility: IMAGING CENTER | Age: 59
End: 2019-06-23
Payer: COMMERCIAL

## 2019-06-23 ENCOUNTER — OUTPATIENT (OUTPATIENT)
Dept: OUTPATIENT SERVICES | Facility: HOSPITAL | Age: 59
LOS: 1 days | End: 2019-06-23
Payer: COMMERCIAL

## 2019-06-23 DIAGNOSIS — Z90.49 ACQUIRED ABSENCE OF OTHER SPECIFIED PARTS OF DIGESTIVE TRACT: Chronic | ICD-10-CM

## 2019-06-23 DIAGNOSIS — H92.12 OTORRHEA, LEFT EAR: ICD-10-CM

## 2019-06-23 DIAGNOSIS — Z98.890 OTHER SPECIFIED POSTPROCEDURAL STATES: Chronic | ICD-10-CM

## 2019-06-23 PROCEDURE — 70553 MRI BRAIN STEM W/O & W/DYE: CPT | Mod: 26

## 2019-06-23 PROCEDURE — A9585: CPT

## 2019-06-23 PROCEDURE — 70553 MRI BRAIN STEM W/O & W/DYE: CPT

## 2019-07-29 ENCOUNTER — APPOINTMENT (OUTPATIENT)
Dept: OPHTHALMOLOGY | Facility: CLINIC | Age: 59
End: 2019-07-29

## 2019-08-01 ENCOUNTER — APPOINTMENT (OUTPATIENT)
Dept: OTOLARYNGOLOGY | Facility: CLINIC | Age: 59
End: 2019-08-01
Payer: MEDICAID

## 2019-08-01 VITALS — BODY MASS INDEX: 34.93 KG/M2 | HEIGHT: 61 IN | WEIGHT: 185 LBS

## 2019-08-01 PROCEDURE — 99213 OFFICE O/P EST LOW 20 MIN: CPT

## 2019-08-06 RX ORDER — CLINDAMYCIN HYDROCHLORIDE 150 MG/1
150 CAPSULE ORAL
Qty: 30 | Refills: 0 | Status: COMPLETED | COMMUNITY
Start: 2019-06-17 | End: 2019-08-06

## 2019-08-06 NOTE — HISTORY OF PRESENT ILLNESS
[de-identified] : 59 yr old returns for follow up- continues to have left brownish thick drainage from left ear-  finished oral antibiotics - not using any ear drops.  No c/o dizziness, but occas. nausea.

## 2019-08-06 NOTE — PHYSICAL EXAM
[Midline] : trachea located in midline position [Normal] : orientation to person, place, and time: normal [de-identified] : left tube open  - otorrhea suctioned for culture - right tube occluded but ME normal

## 2019-08-08 LAB — BACTERIA FLD CULT: ABNORMAL

## 2019-09-09 ENCOUNTER — APPOINTMENT (OUTPATIENT)
Dept: OTOLARYNGOLOGY | Facility: CLINIC | Age: 59
End: 2019-09-09
Payer: COMMERCIAL

## 2019-09-09 VITALS
SYSTOLIC BLOOD PRESSURE: 134 MMHG | BODY MASS INDEX: 34.93 KG/M2 | DIASTOLIC BLOOD PRESSURE: 84 MMHG | HEIGHT: 61 IN | WEIGHT: 185 LBS

## 2019-09-09 DIAGNOSIS — J30.9 ALLERGIC RHINITIS, UNSPECIFIED: ICD-10-CM

## 2019-09-09 PROCEDURE — 99213 OFFICE O/P EST LOW 20 MIN: CPT

## 2019-10-01 NOTE — PHYSICAL EXAM
[Midline] : trachea located in midline position [Normal] : orientation to person, place, and time: normal [de-identified] : left tube open  -DRY - right normal

## 2019-10-01 NOTE — HISTORY OF PRESENT ILLNESS
[de-identified] : 59 yr old returns for follow up of left ear drainage-  completed oral antibiotics and ear drops as directed x 21 days-  feels drainage from left ear is still present, but less and is now clear colored instead of brownish sticky drainage.  No c/o of pain, vertigo or hearing worsening

## 2019-10-02 ENCOUNTER — APPOINTMENT (OUTPATIENT)
Dept: OTOLARYNGOLOGY | Facility: CLINIC | Age: 59
End: 2019-10-02

## 2019-10-25 LAB
BACTERIA FLD CULT: ABNORMAL
BACTERIA FLD CULT: ABNORMAL

## 2019-12-09 ENCOUNTER — APPOINTMENT (OUTPATIENT)
Dept: OTOLARYNGOLOGY | Facility: CLINIC | Age: 59
End: 2019-12-09

## 2020-06-23 ENCOUNTER — LABORATORY RESULT (OUTPATIENT)
Age: 60
End: 2020-06-23

## 2020-06-23 ENCOUNTER — APPOINTMENT (OUTPATIENT)
Dept: OTOLARYNGOLOGY | Facility: CLINIC | Age: 60
End: 2020-06-23
Payer: COMMERCIAL

## 2020-06-23 ENCOUNTER — OUTPATIENT (OUTPATIENT)
Dept: OUTPATIENT SERVICES | Facility: HOSPITAL | Age: 60
LOS: 1 days | Discharge: HOME | End: 2020-06-23

## 2020-06-23 VITALS — HEIGHT: 62 IN | WEIGHT: 170 LBS | BODY MASS INDEX: 31.28 KG/M2

## 2020-06-23 DIAGNOSIS — Z98.890 OTHER SPECIFIED POSTPROCEDURAL STATES: Chronic | ICD-10-CM

## 2020-06-23 DIAGNOSIS — Z90.49 ACQUIRED ABSENCE OF OTHER SPECIFIED PARTS OF DIGESTIVE TRACT: Chronic | ICD-10-CM

## 2020-06-23 PROCEDURE — 99213 OFFICE O/P EST LOW 20 MIN: CPT | Mod: 25

## 2020-06-23 PROCEDURE — 92504 EAR MICROSCOPY EXAMINATION: CPT

## 2020-06-23 RX ORDER — SULFAMETHOXAZOLE AND TRIMETHOPRIM 800; 160 MG/1; MG/1
800-160 TABLET ORAL
Qty: 42 | Refills: 0 | Status: COMPLETED | COMMUNITY
Start: 2019-06-28 | End: 2020-06-23

## 2020-06-23 RX ORDER — ONDANSETRON 4 MG/1
4 TABLET ORAL EVERY 8 HOURS
Qty: 1 | Refills: 0 | Status: COMPLETED | COMMUNITY
Start: 2019-06-17 | End: 2020-06-23

## 2020-06-23 RX ORDER — SULFACETAMIDE SODIUM AND PREDNISOLONE SODIUM PHOSPHATE 100; 2.3 MG/ML; MG/ML
10-0.23 SOLUTION/ DROPS OPHTHALMIC
Qty: 10 | Refills: 2 | Status: COMPLETED | COMMUNITY
Start: 2017-06-28 | End: 2020-06-23

## 2020-06-23 RX ORDER — CHLORTHALIDONE 25 MG/1
25 TABLET ORAL
Qty: 30 | Refills: 0 | Status: COMPLETED | COMMUNITY
Start: 2017-03-22 | End: 2020-06-23

## 2020-06-24 NOTE — PHYSICAL EXAM
[Normal] : no rashes [Binocular Microscopic Exam] : Binocular microscopic exam was performed [de-identified] : monomer present [de-identified] : + PE tube, mucopurulence scant, culutured

## 2020-06-24 NOTE — PROCEDURE
[] : Binocular Microscopy [Same] : same as the Pre Op Dx. [FreeTextEntry1] : L otorrhea [FreeTextEntry4] : none [FreeTextEntry6] : Operative microscope was used to examine/treat the ear canal, ear drum and visible middle ear landmarks. Adequate exam/treatment would not have been possible without the use of a microscope. Findings are described.\par \par

## 2020-06-24 NOTE — HISTORY OF PRESENT ILLNESS
[de-identified] : 60 year woman follow up left ear otorrhea for the last four months, pressure and pain.  Pt. s/p tympanoplasty with tube placement 2016..  Pt, denies any changes in her hearing, headaches or dizziness.  Pt. has been taking ciprodex drops in the left ear for the last four months.  Right ear is without pt. complaints

## 2020-06-24 NOTE — REASON FOR VISIT
[Subsequent Evaluation] : a subsequent evaluation for [FreeTextEntry2] : follow up left ear otorrhea

## 2020-07-06 DIAGNOSIS — H70.12 CHRONIC MASTOIDITIS, LEFT EAR: ICD-10-CM

## 2020-07-06 DIAGNOSIS — H92.12 OTORRHEA, LEFT EAR: ICD-10-CM

## 2020-07-16 ENCOUNTER — LABORATORY RESULT (OUTPATIENT)
Age: 60
End: 2020-07-16

## 2020-07-16 ENCOUNTER — APPOINTMENT (OUTPATIENT)
Dept: OTOLARYNGOLOGY | Facility: CLINIC | Age: 60
End: 2020-07-16
Payer: COMMERCIAL

## 2020-07-16 VITALS — SYSTOLIC BLOOD PRESSURE: 132 MMHG | HEART RATE: 65 BPM | DIASTOLIC BLOOD PRESSURE: 84 MMHG | TEMPERATURE: 65 F

## 2020-07-16 VITALS — BODY MASS INDEX: 31.28 KG/M2 | WEIGHT: 170 LBS | HEIGHT: 62 IN

## 2020-07-16 PROCEDURE — 99213 OFFICE O/P EST LOW 20 MIN: CPT | Mod: 25

## 2020-07-16 PROCEDURE — 92504 EAR MICROSCOPY EXAMINATION: CPT

## 2020-07-16 RX ORDER — FLUTICASONE PROPIONATE 50 UG/1
50 SPRAY, METERED NASAL
Qty: 2 | Refills: 2 | Status: ACTIVE | COMMUNITY
Start: 2020-07-16 | End: 1900-01-01

## 2020-07-16 RX ORDER — AZELASTINE HYDROCHLORIDE 137 UG/1
0.1 SPRAY, METERED NASAL
Qty: 1 | Refills: 5 | Status: ACTIVE | COMMUNITY
Start: 2020-07-16 | End: 1900-01-01

## 2020-07-17 RX ORDER — SULFAMETHOXAZOLE AND TRIMETHOPRIM 800; 160 MG/1; MG/1
800-160 TABLET ORAL TWICE DAILY
Qty: 10 | Refills: 0 | Status: ACTIVE | COMMUNITY
Start: 2020-07-17 | End: 1900-01-01

## 2020-07-17 NOTE — PROCEDURE
[Same] : same as the Pre Op Dx. [FreeTextEntry1] : L otorrhea [] : Binocular Microscopy [FreeTextEntry4] : none [FreeTextEntry6] : Operative microscope was used to examine/treat the ear canal, ear drum and visible middle ear landmarks. Adequate exam/treatment would not have been possible without the use of a microscope. Findings are described.\par \par

## 2020-07-17 NOTE — HISTORY OF PRESENT ILLNESS
[de-identified] : continues with L otorrhea\par + pain and pressure in left ear\par believes her nose is congested as well

## 2020-09-24 ENCOUNTER — TRANSCRIPTION ENCOUNTER (OUTPATIENT)
Age: 60
End: 2020-09-24

## 2020-12-21 PROBLEM — H66.91 ACUTE OTITIS MEDIA, RIGHT: Status: RESOLVED | Noted: 2019-05-15 | Resolved: 2020-12-21

## 2021-01-06 ENCOUNTER — APPOINTMENT (OUTPATIENT)
Dept: OTOLARYNGOLOGY | Facility: CLINIC | Age: 61
End: 2021-01-06
Payer: COMMERCIAL

## 2021-01-06 VITALS
SYSTOLIC BLOOD PRESSURE: 122 MMHG | HEIGHT: 62 IN | BODY MASS INDEX: 31.28 KG/M2 | WEIGHT: 170 LBS | HEART RATE: 56 BPM | TEMPERATURE: 97.3 F | DIASTOLIC BLOOD PRESSURE: 80 MMHG

## 2021-01-06 PROCEDURE — 99214 OFFICE O/P EST MOD 30 MIN: CPT

## 2021-01-06 PROCEDURE — 99072 ADDL SUPL MATRL&STAF TM PHE: CPT

## 2021-01-06 RX ORDER — SULFACETAMIDE SODIUM AND PREDNISOLONE SODIUM PHOSPHATE 100; 2.3 MG/ML; MG/ML
10-0.23 SOLUTION/ DROPS OPHTHALMIC
Qty: 1 | Refills: 5 | Status: ACTIVE | COMMUNITY
Start: 2020-07-17 | End: 1900-01-01

## 2021-01-07 NOTE — PHYSICAL EXAM
[Midline] : trachea located in midline position [Normal] : no rashes [Hearing Loss Right Only] : normal [Hearing Loss Left Only] : normal [de-identified] : in place and functional without drainage

## 2021-01-07 NOTE — HISTORY OF PRESENT ILLNESS
[T plasty] : tympanoplasty [M & T] : myringotomy tube [Mastoid] : mastoid surgery [No] : patient does not have a  history of radiation therapy [de-identified] : 61 yo female\par Patient with hx of left tympanomastoidectomy years ago and left myringotomy tube placement year ago complains of constant left sided ear drainage x 6 months. States she saw Dr. Raymond 6 months ago, she had recommend to remove the tube and clean out the ear w/ revision ear surgery. She states that she does not want to go through another surgery. \par Left ear d/c has stopped 4 days ago, she has been using ear drops which helped. Also states that she bought a LED like machine from [employer] and it helps with killing bacteria in the ear, wants to know if she can use it. \par no other modifying factors\par \par  [Hearing Loss] : hearing loss [Otalgia] : otalgia [Otorrhea] : otorrhea [Vertigo] : no vertigo [Meniere Disease] : no Meniere disease [Eustachian Tube Dysfunction] : no eustachian tube dysfunction [Otosclerosis] : no otosclerosis [Perilymphatic Fistula] : no perilymphatic fistula [Hypertension] : no hypertension [Loud Noise Exposure] : no history of loud noise exposure [Smoking] : no smoking [Early Onset Hearing Loss] : no early onset hearing loss [Stroke] : no stroke [Facial Pain] : no facial pain [Facial Pressure] : no facial pressure [Nasal Congestion] : no nasal congestion [Ear Fullness] : ear fullness [Allergic Rhinitis] : no allergic rhinitis [Environmental Allergies] : no environmental allergies [Seasonal Allergies] : no seasonal allergies [Environmental Allergens] : no environmental allergens [Adenoidectomy] : no adenoidectomy [Allergies] : no allergies [Asthma] : no asthma [Neck Mass] : no neck mass [Neck Pain] : no neck pain [Chills] : no chills [Cold Intolerance] : no cold intolerance [Cough] : no cough [Fatigue] : no fatigue [Heat Intolerance] : no heat intolerance [Hyperthyroidism] : no hyperthyroidism [Sialadenitis] : no sialadenitis [Hodgkin Disease] : no hodgkin disease [Non-Hodgkin Lymphoma] : no non-hodgkin lymphoma [Tobacco Use] : no tobacco use [Alcohol Use] : no alcohol use [Graves Disease] : no graves disease

## 2021-01-07 NOTE — ASSESSMENT
[FreeTextEntry1] : Patient with hx of left tympanomastoidectomy years ago and left myringotomy tube placement year ago complains of constant left sided ear drainage x 6 months. \par -Acupuncture advised \par -continue LED light therapy for the ear \par -MRI ordered \par treatment of intermittent ear drainage is important to avoid future infection and hearing loss\par \par Dr. Aguiar and Segundo-clinical note especially reviewed\par \par Further recommendations after further data collection\par \par Over the above was discussed in detail with the patient\par \par f/u after data collection

## 2021-01-23 ENCOUNTER — OUTPATIENT (OUTPATIENT)
Dept: OUTPATIENT SERVICES | Facility: HOSPITAL | Age: 61
LOS: 1 days | End: 2021-01-23
Payer: MEDICAID

## 2021-01-23 ENCOUNTER — APPOINTMENT (OUTPATIENT)
Dept: MRI IMAGING | Facility: IMAGING CENTER | Age: 61
End: 2021-01-23
Payer: COMMERCIAL

## 2021-01-23 DIAGNOSIS — H70.12 CHRONIC MASTOIDITIS, LEFT EAR: ICD-10-CM

## 2021-01-23 DIAGNOSIS — Z90.49 ACQUIRED ABSENCE OF OTHER SPECIFIED PARTS OF DIGESTIVE TRACT: Chronic | ICD-10-CM

## 2021-01-23 DIAGNOSIS — Z98.890 OTHER SPECIFIED POSTPROCEDURAL STATES: Chronic | ICD-10-CM

## 2021-01-23 DIAGNOSIS — Z00.8 ENCOUNTER FOR OTHER GENERAL EXAMINATION: ICD-10-CM

## 2021-01-23 PROCEDURE — 70551 MRI BRAIN STEM W/O DYE: CPT | Mod: 26

## 2021-01-23 PROCEDURE — 70551 MRI BRAIN STEM W/O DYE: CPT

## 2021-01-26 ENCOUNTER — NON-APPOINTMENT (OUTPATIENT)
Age: 61
End: 2021-01-26

## 2021-10-01 NOTE — H&P PST ADULT - PROBLEM/PLAN-2
10/1/2021         RE: Alia Villaseñor  [address]  [place] [address]        Dear Colleague,    Thank you for referring your patient, Alia Villaseñor, to the [facility]. Please see a copy of my visit note below.    Donor Iohexol test    Alia Villaseñor presents today to [facility] for a Donor Iohexol test.      Progress note:  ID verified by name and .     The following information was verified with the patient:  Female Patients is there any possibility of being pregnant No  Is there a history of allergy (skin rash, swelling, ect) to:   A.  Iodine (except skin reactions to betadine): No   B. Intravenous radio-contrast agents: No   C. Seafood No     present during visit today: Not Applicable.    R.N. provided patient with educational handout regarding timed test. Yes     Iohexol administered over 2 minutes via butterfly  Positive blood return verified before and after.  20 gauge PIV placed in 20G for blood draws and CT this afternoon.    Medication administered:  Iohexol (Omnipaque 300mg iodine/ml concentration) 5 mls.    Start time: 740  Stop time: 742    Drug Waste Record    Drug Name: Iohexol  Dose: 5ml  Route administered: IV  NDC #: [id number]  Amount of waste(mL):5 ml  Reason for waste: Single use vial      Administrations This Visit     iohexol (OMNIPAQUE 300) injection 5 mL     Admin Date  10/01/2021 Action  Given Dose  5 mL Route  Intravenous Administered By  Leatha Soni RN                Evaluation nurse in transplant to draw labs at 2 and 4 hours post iohexol administration.  Patient given a slip with the times to get labs drawn and verbalized understanding of the plan.    Patient tolerated the procedure:  Yes    After the infusion patient was discharged to the next appointment.    Leatha Soni RN          Again, thank you for allowing me to participate in the care of your patient.        Sincerely,        [facility]     DISPLAY PLAN FREE TEXT

## 2021-11-11 ENCOUNTER — APPOINTMENT (OUTPATIENT)
Dept: OTOLARYNGOLOGY | Facility: CLINIC | Age: 61
End: 2021-11-11
Payer: COMMERCIAL

## 2021-11-11 VITALS
BODY MASS INDEX: 33.13 KG/M2 | HEIGHT: 62 IN | SYSTOLIC BLOOD PRESSURE: 122 MMHG | DIASTOLIC BLOOD PRESSURE: 80 MMHG | WEIGHT: 180 LBS | TEMPERATURE: 98 F | HEART RATE: 56 BPM

## 2021-11-11 PROCEDURE — 69210 REMOVE IMPACTED EAR WAX UNI: CPT

## 2021-11-11 PROCEDURE — 99213 OFFICE O/P EST LOW 20 MIN: CPT | Mod: 25

## 2021-11-11 RX ORDER — SULFACETAMIDE SODIUM AND PREDNISOLONE SODIUM PHOSPHATE 100; 2.3 MG/ML; MG/ML
10-0.23 SOLUTION/ DROPS OPHTHALMIC
Qty: 1 | Refills: 5 | Status: ACTIVE | COMMUNITY
Start: 2021-11-11 | End: 1900-01-01

## 2021-11-11 NOTE — PHYSICAL EXAM
[Midline] : trachea located in midline position [Normal] : the left mastoid was normal [Hearing Loss Right Only] : normal [Hearing Loss Left Only] : normal [de-identified] : MT in place and functional  drainage

## 2021-11-11 NOTE — END OF VISIT
[FreeTextEntry3] : I personally saw and examined MARIA FERNANDA VILLARREAL in detail. I spoke to AMAURY Collado regarding the assessment and plan of care. I reviewed the above assessment and plan of care, and agree. I have made changes in changes in the body of the note where appropriate.I personally reviewed the HPI, PMH, FH, SH, ROS and medications/allergies. I have spoken to AMAURY Collado regarding the history and have personally determined the assessment and plan of care, and documented this myself. I was present and participated in all key portions of the encounter and all procedures noted above. I have made changes in the body of the note where appropriate.\par \par Attesting Faculty: See Attending Signature Below \par \par \par  [Time Spent: ___ minutes] : I have spent [unfilled] minutes of time on the encounter.

## 2021-11-11 NOTE — HISTORY OF PRESENT ILLNESS
[T plasty] : tympanoplasty [M & T] : myringotomy tube [Mastoid] : mastoid surgery [No] : patient does not have a  history of radiation therapy [Hearing Loss] : hearing loss [Otalgia] : otalgia [Otorrhea] : otorrhea [de-identified] : Patient with hx of left tympanomastoidectomy years ago and left myringotomy tube placement year ago complains of constant left sided ear drainage x 1 year. She uses ear drops when she has drainage, complains ears are always itchy. \par Pt has no ear pain, tinnitus, vertigo, nasal congestion, nasal discharge, epistaxis, sinus infections, facial pain, facial pressure, throat pain, dysphagia or fevers\par \par \par  [FreeTextEntry1] : P [Ear Fullness] : no ear fullness [Vertigo] : no vertigo [Meniere Disease] : no Meniere disease [Eustachian Tube Dysfunction] : no eustachian tube dysfunction [Otosclerosis] : no otosclerosis [Perilymphatic Fistula] : no perilymphatic fistula [Hypertension] : no hypertension [Loud Noise Exposure] : no history of loud noise exposure [Smoking] : no smoking [Early Onset Hearing Loss] : no early onset hearing loss [Stroke] : no stroke [Facial Pain] : no facial pain [Facial Pressure] : no facial pressure [Nasal Congestion] : no nasal congestion [Allergic Rhinitis] : no allergic rhinitis [Environmental Allergies] : no environmental allergies [Seasonal Allergies] : no seasonal allergies [Environmental Allergens] : no environmental allergens [Adenoidectomy] : no adenoidectomy [Allergies] : no allergies [Asthma] : no asthma [Neck Mass] : no neck mass [Neck Pain] : no neck pain [Chills] : no chills [Cold Intolerance] : no cold intolerance [Cough] : no cough [Fatigue] : no fatigue [Heat Intolerance] : no heat intolerance [Hyperthyroidism] : no hyperthyroidism [Sialadenitis] : no sialadenitis [Hodgkin Disease] : no hodgkin disease [Non-Hodgkin Lymphoma] : no non-hodgkin lymphoma [Tobacco Use] : no tobacco use [Alcohol Use] : no alcohol use [Graves Disease] : no graves disease

## 2021-11-11 NOTE — ASSESSMENT
[FreeTextEntry1] : Patient with hx of left tympanomastoidectomy years ago and left myringotomy tube placement year ago complains of constant left sided ear drainage x 1 year \par -left ear clean without infection \par -wax cleaned \par -Left MT in place and draining\par -Rx: Blephamide drops \par \par f/u 2 weeks or prn

## 2021-11-11 NOTE — REASON FOR VISIT
[Subsequent Evaluation] : a subsequent evaluation for [Ear Drainage] : ear drainage [FreeTextEntry2] : f/u

## 2022-01-14 NOTE — PHYSICAL EXAM
Group Topic: BH Therapeutic Activity    Date: 1/14/2022  Start Time: 11:00 AM  End Time: 11:45 AM  Facilitators: Pauline Adame RN; LEXI Cordero; Sinai Bradley    Focus: Yoga and Meditation   Number in attendance: 14    Patient did not participate in meditation or yoga activity    [Binocular Microscopic Exam] : Binocular microscopic exam was performed [de-identified] : monomer present [de-identified] : + PE tube, mucopurulence scant, cultured [Normal] : no rashes

## 2022-02-16 ENCOUNTER — APPOINTMENT (OUTPATIENT)
Dept: OTOLARYNGOLOGY | Facility: CLINIC | Age: 62
End: 2022-02-16
Payer: COMMERCIAL

## 2022-02-16 VITALS
HEART RATE: 59 BPM | HEIGHT: 62 IN | TEMPERATURE: 97.2 F | SYSTOLIC BLOOD PRESSURE: 125 MMHG | DIASTOLIC BLOOD PRESSURE: 73 MMHG | WEIGHT: 179 LBS | BODY MASS INDEX: 32.94 KG/M2

## 2022-02-16 PROCEDURE — 99214 OFFICE O/P EST MOD 30 MIN: CPT | Mod: 25

## 2022-02-16 PROCEDURE — 69210 REMOVE IMPACTED EAR WAX UNI: CPT

## 2022-02-16 RX ORDER — METHYLPREDNISOLONE 4 MG/1
4 TABLET ORAL
Qty: 1 | Refills: 1 | Status: ACTIVE | COMMUNITY
Start: 2022-02-16 | End: 1900-01-01

## 2022-02-16 NOTE — PROCEDURE
[FreeTextEntry3] : wax-\par After informed verbal consent is obtained, cerumen is removed from the right and left  ear canal with a curette and suction.\par Rx:\par Routine debridement suggested to keep the ears free of wax.\par

## 2022-02-16 NOTE — PHYSICAL EXAM
[de-identified] : left tube in place--clogged w/ wax [] : septum deviated bilaterally [Midline] : trachea located in midline position [Normal] : no rashes

## 2022-02-16 NOTE — HISTORY OF PRESENT ILLNESS
[T plasty] : tympanoplasty [Mastoid] : mastoid surgery [No] : patient does not have a  history of radiation therapy [de-identified] : 60 yo female\par known h/o left chr mastoiditis w/ M&T in the ear\par Now w/ recent h/o left era and head pressure and dizziness\par Cardio eval-not cause of dizziness\par Pt wishes MRI of head\par no other modifying factors\par no nasal or throat complaints\par  [Ear Fullness] : ear fullness [Vertigo] : vertigo [Dizziness] : dizziness [Hearing Loss] : hearing loss [Otalgia] : otalgia [Recurrent Otitis Media] : recurrent otitis media [Eustachian Tube Dysfunction] : eustachian tube dysfunction [Early Onset Hearing Loss] : no early onset hearing loss [Stroke] : no stroke [Allergic Rhinitis] : no allergic rhinitis [Adenoidectomy] : no adenoidectomy [Allergies] : no allergies [Asthma] : no asthma [Hyperthyroidism] : no hyperthyroidism [Sialadenitis] : no sialadenitis [Hodgkin Disease] : no hodgkin disease [Non-Hodgkin Lymphoma] : no non-hodgkin lymphoma [None] : No risk factors have been identified. [Graves Disease] : no graves disease [Thyroid Cancer] : no thyroid cancer

## 2022-02-16 NOTE — ASSESSMENT
[FreeTextEntry1] : Left clogged tube-now resolved\par No evidence of infection Rec Medrol dospak to relieve occult inflammation\par MRI of OAC's and brain to r/o occult abn\par f/u after MRI and medol dospak

## 2022-02-19 ENCOUNTER — RESULT REVIEW (OUTPATIENT)
Age: 62
End: 2022-02-19

## 2022-02-28 ENCOUNTER — NON-APPOINTMENT (OUTPATIENT)
Age: 62
End: 2022-02-28

## 2022-02-28 ENCOUNTER — APPOINTMENT (OUTPATIENT)
Dept: MRI IMAGING | Facility: IMAGING CENTER | Age: 62
End: 2022-02-28
Payer: COMMERCIAL

## 2022-02-28 ENCOUNTER — OUTPATIENT (OUTPATIENT)
Dept: OUTPATIENT SERVICES | Facility: HOSPITAL | Age: 62
LOS: 1 days | End: 2022-02-28
Payer: MEDICAID

## 2022-02-28 DIAGNOSIS — Z98.890 OTHER SPECIFIED POSTPROCEDURAL STATES: Chronic | ICD-10-CM

## 2022-02-28 DIAGNOSIS — H70.12 CHRONIC MASTOIDITIS, LEFT EAR: ICD-10-CM

## 2022-02-28 DIAGNOSIS — Z90.49 ACQUIRED ABSENCE OF OTHER SPECIFIED PARTS OF DIGESTIVE TRACT: Chronic | ICD-10-CM

## 2022-02-28 PROCEDURE — 70551 MRI BRAIN STEM W/O DYE: CPT

## 2022-02-28 PROCEDURE — A9585: CPT

## 2022-02-28 PROCEDURE — 70551 MRI BRAIN STEM W/O DYE: CPT | Mod: 26

## 2022-03-02 ENCOUNTER — NON-APPOINTMENT (OUTPATIENT)
Age: 62
End: 2022-03-02

## 2022-03-28 ENCOUNTER — APPOINTMENT (OUTPATIENT)
Dept: OTOLARYNGOLOGY | Facility: CLINIC | Age: 62
End: 2022-03-28

## 2022-10-28 ENCOUNTER — APPOINTMENT (OUTPATIENT)
Dept: OTOLARYNGOLOGY | Facility: CLINIC | Age: 62
End: 2022-10-28

## 2022-10-28 VITALS
HEIGHT: 62 IN | BODY MASS INDEX: 35.15 KG/M2 | TEMPERATURE: 97.1 F | SYSTOLIC BLOOD PRESSURE: 117 MMHG | HEART RATE: 60 BPM | WEIGHT: 191 LBS | DIASTOLIC BLOOD PRESSURE: 75 MMHG

## 2022-10-28 DIAGNOSIS — H90.2 CONDUCTIVE HEARING LOSS, UNSPECIFIED: ICD-10-CM

## 2022-10-28 DIAGNOSIS — R42 DIZZINESS AND GIDDINESS: ICD-10-CM

## 2022-10-28 PROCEDURE — 99214 OFFICE O/P EST MOD 30 MIN: CPT

## 2022-10-28 RX ORDER — AMLODIPINE BESYLATE 2.5 MG/1
2.5 TABLET ORAL
Qty: 30 | Refills: 0 | Status: ACTIVE | COMMUNITY
Start: 2022-10-12

## 2022-10-28 RX ORDER — ESOMEPRAZOLE MAGNESIUM 40 MG/1
40 CAPSULE, DELAYED RELEASE ORAL
Qty: 30 | Refills: 0 | Status: ACTIVE | COMMUNITY
Start: 2022-10-12

## 2022-10-28 RX ORDER — HALOBETASOL PROPIONATE 0.5 MG/G
0.05 OINTMENT TOPICAL
Qty: 50 | Refills: 0 | Status: ACTIVE | COMMUNITY
Start: 2022-09-15

## 2022-10-28 RX ORDER — LOSARTAN POTASSIUM AND HYDROCHLOROTHIAZIDE 12.5; 5 MG/1; MG/1
50-12.5 TABLET ORAL
Qty: 90 | Refills: 0 | Status: ACTIVE | COMMUNITY
Start: 2022-08-18

## 2022-10-28 RX ORDER — ICOSAPENT ETHYL 1 G/1
1 CAPSULE ORAL
Qty: 120 | Refills: 0 | Status: ACTIVE | COMMUNITY
Start: 2022-10-12

## 2022-10-28 RX ORDER — OFLOXACIN OTIC 3 MG/ML
0.3 SOLUTION AURICULAR (OTIC) TWICE DAILY
Qty: 1 | Refills: 1 | Status: ACTIVE | COMMUNITY
Start: 2022-10-28 | End: 1900-01-01

## 2022-10-28 RX ORDER — ROSUVASTATIN CALCIUM 40 MG/1
40 TABLET, FILM COATED ORAL
Qty: 30 | Refills: 0 | Status: ACTIVE | COMMUNITY
Start: 2022-10-12

## 2022-10-28 RX ORDER — ESTRADIOL 10 UG/1
10 INSERT VAGINAL
Qty: 8 | Refills: 0 | Status: ACTIVE | COMMUNITY
Start: 2022-06-23

## 2022-10-28 RX ORDER — LOSARTAN POTASSIUM 50 MG/1
50 TABLET, FILM COATED ORAL
Qty: 30 | Refills: 0 | Status: ACTIVE | COMMUNITY
Start: 2022-08-12

## 2022-10-28 RX ORDER — SEMAGLUTIDE 1.34 MG/ML
2 INJECTION, SOLUTION SUBCUTANEOUS
Qty: 2 | Refills: 0 | Status: ACTIVE | COMMUNITY
Start: 2022-10-12

## 2022-10-28 RX ORDER — CYCLOSPORINE 0.5 MG/ML
0.05 EMULSION OPHTHALMIC
Qty: 60 | Refills: 0 | Status: ACTIVE | COMMUNITY
Start: 2022-10-12

## 2022-10-28 NOTE — PROCEDURE
[Same] : same as the Pre Op Dx. [] : Debridement of Mastoid [FreeTextEntry6] : left mucoid otorrhea and debris

## 2022-10-28 NOTE — ASSESSMENT
[FreeTextEntry1] : Hx of left mastoidectomy, now with otorrhea and mucoid secretions in mastoid bowl:\par - mastoid bowl cleaned today of otorrhea\par - CSF power placed today \par - Start ofloxin gtts x 1 week\par - F/U with Dr. Orlando in 2 weeks to recheck

## 2022-10-28 NOTE — PHYSICAL EXAM
[Normal] : tympanic membranes are normal in both ears [de-identified] :  Left with otorrhea.  Right clear.

## 2022-10-28 NOTE — HISTORY OF PRESENT ILLNESS
[de-identified] : 63 y/o F with Hx of Left ear mastoiditis s/p Mastoidectomy.  Now with fullness, pain and vertigo.  Hearing is unchanged.

## 2022-11-11 ENCOUNTER — APPOINTMENT (OUTPATIENT)
Dept: OTOLARYNGOLOGY | Facility: CLINIC | Age: 62
End: 2022-11-11

## 2022-11-11 VITALS
HEIGHT: 62 IN | WEIGHT: 191 LBS | HEART RATE: 58 BPM | SYSTOLIC BLOOD PRESSURE: 120 MMHG | TEMPERATURE: 97.6 F | DIASTOLIC BLOOD PRESSURE: 78 MMHG | BODY MASS INDEX: 35.15 KG/M2

## 2022-11-11 DIAGNOSIS — H61.23 IMPACTED CERUMEN, BILATERAL: ICD-10-CM

## 2022-11-11 PROCEDURE — 69210 REMOVE IMPACTED EAR WAX UNI: CPT

## 2022-11-11 PROCEDURE — 99214 OFFICE O/P EST MOD 30 MIN: CPT | Mod: 25

## 2022-11-11 NOTE — ASSESSMENT
[FreeTextEntry1] : Ms. VILLARREAL 62 year F w/ Hx of left mastoidectomy presents s/p left otorrhea, d/c decreased and feels like she is in a head fog. \par \par Left Mastoiditis\par - now with L TM perf   \par - continue ear drops for another 5 days \par - H2O precautions reviewed\par \par \par f/u prn

## 2022-11-11 NOTE — PHYSICAL EXAM
[Midline] : trachea located in midline position [Normal] : temporomandibular joint is normal [de-identified] :  Left with otorrhea and wax .  Right clear.  [de-identified] : L TM perf -moist

## 2022-11-11 NOTE — HISTORY OF PRESENT ILLNESS
[T plasty] : tympanoplasty [Mastoid] : mastoid surgery [Ear Fullness] : ear fullness [Otorrhea] : otorrhea [None] : The patient is currently asymptomatic. [de-identified] : 63 y/o F with Hx of Left ear mastoiditis s/p Mastoidectomy presents s/p otorrhea. She has scant drainage from the left ear and head fog. Finished using ear drops. \par Seen 10/28/2022 for left ear drainage in our office [Nasal Congestion] : no nasal congestion

## 2022-11-18 RX ORDER — SULFACETAMIDE SODIUM AND PREDNISOLONE SODIUM PHOSPHATE 100; 2.3 MG/ML; MG/ML
10-0.23 SOLUTION/ DROPS OPHTHALMIC
Qty: 1 | Refills: 5 | Status: ACTIVE | COMMUNITY
Start: 2022-11-18 | End: 1900-01-01

## 2022-11-21 ENCOUNTER — APPOINTMENT (OUTPATIENT)
Dept: NEUROLOGY | Facility: CLINIC | Age: 62
End: 2022-11-21

## 2022-11-23 ENCOUNTER — APPOINTMENT (OUTPATIENT)
Dept: OTOLARYNGOLOGY | Facility: CLINIC | Age: 62
End: 2022-11-23

## 2022-11-23 VITALS
DIASTOLIC BLOOD PRESSURE: 76 MMHG | HEART RATE: 70 BPM | WEIGHT: 190 LBS | HEIGHT: 62 IN | TEMPERATURE: 97.1 F | SYSTOLIC BLOOD PRESSURE: 122 MMHG | BODY MASS INDEX: 34.96 KG/M2

## 2022-11-23 DIAGNOSIS — H70.12 CHRONIC MASTOIDITIS, LEFT EAR: ICD-10-CM

## 2022-11-23 PROCEDURE — 99214 OFFICE O/P EST MOD 30 MIN: CPT

## 2022-11-23 RX ORDER — SULFAMETHOXAZOLE AND TRIMETHOPRIM 800; 160 MG/1; MG/1
800-160 TABLET ORAL TWICE DAILY
Qty: 20 | Refills: 2 | Status: ACTIVE | COMMUNITY
Start: 2022-11-23 | End: 1900-01-01

## 2022-11-23 RX ORDER — METHYLPREDNISOLONE 4 MG/1
4 TABLET ORAL
Qty: 1 | Refills: 1 | Status: ACTIVE | COMMUNITY
Start: 2022-11-23 | End: 1900-01-01

## 2022-11-23 NOTE — PHYSICAL EXAM
[Midline] : trachea located in midline position [Normal] : no rashes [de-identified] :   Right clear.  [de-identified] : L metal tube in place-scant drainage

## 2022-11-23 NOTE — HISTORY OF PRESENT ILLNESS
[T plasty] : tympanoplasty [Mastoid] : mastoid surgery [Ear Fullness] : ear fullness [Otorrhea] : otorrhea [None] : The patient is currently asymptomatic. [de-identified] : 61 y/o F with Hx of Left ear mastoiditis s/p Mastoidectomy presents s/p otorrhea. She has scant drainage from the left ear and head fog. Finished using ear drops. \par Seen 10/28/2022 for left ear drainage in our office [FreeTextEntry1] : PAtient presents for follow up states she continues to have d/c from the L ear despite using ear drops. Discharge is brown and yellowish in color. Still feels like she has a head fog.  [Nasal Congestion] : no nasal congestion

## 2022-11-23 NOTE — ASSESSMENT
[FreeTextEntry1] : Ms. VILLARREAL 62 year F w/ Hx of left mastoidectomy presents s/p left otorrhea, continues to have ear d/c despite using ear drops \par \par Left Mastoiditis w/ L M&T\par rx-Bactrim DS po and medrol dospak\par \par f/u prn and after pills finished\par consider revision mastoidectomy

## 2022-12-05 ENCOUNTER — APPOINTMENT (OUTPATIENT)
Dept: OTOLARYNGOLOGY | Facility: CLINIC | Age: 62
End: 2022-12-05
Payer: COMMERCIAL

## 2022-12-05 VITALS
TEMPERATURE: 98 F | HEART RATE: 75 BPM | DIASTOLIC BLOOD PRESSURE: 87 MMHG | SYSTOLIC BLOOD PRESSURE: 127 MMHG | HEIGHT: 62 IN | BODY MASS INDEX: 34.96 KG/M2 | WEIGHT: 190 LBS

## 2022-12-05 DIAGNOSIS — H92.12 OTORRHEA, LEFT EAR: ICD-10-CM

## 2022-12-05 DIAGNOSIS — H69.83 OTHER SPECIFIED DISORDERS OF EUSTACHIAN TUBE, BILATERAL: ICD-10-CM

## 2022-12-05 PROCEDURE — 99213 OFFICE O/P EST LOW 20 MIN: CPT

## 2022-12-05 PROCEDURE — 99214 OFFICE O/P EST MOD 30 MIN: CPT | Mod: 25

## 2022-12-05 PROCEDURE — 99212 OFFICE O/P EST SF 10 MIN: CPT

## 2022-12-05 RX ORDER — FLUTICASONE PROPIONATE 50 UG/1
50 SPRAY, METERED NASAL
Qty: 1 | Refills: 5 | Status: ACTIVE | COMMUNITY
Start: 2022-12-05 | End: 1900-01-01

## 2022-12-05 NOTE — PHYSICAL EXAM
[Midline] : trachea located in midline position [Normal] : no rashes [de-identified] :   Right clear.  [de-identified] : L metal tube in place-scant drainage

## 2022-12-05 NOTE — HISTORY OF PRESENT ILLNESS
[T plasty] : tympanoplasty [Mastoid] : mastoid surgery [Ear Fullness] : ear fullness [Otorrhea] : otorrhea [None] : The patient is currently asymptomatic. [de-identified] : 61 y/o F with Hx of Left ear mastoiditis s/p Mastoidectomy presents s/p otorrhea. She has scant drainage from the left ear and head fog. Finished using ear drops. \par Seen 10/28/2022 for left ear drainage in our office [FreeTextEntry1] : 12/5/2022\par PAtient presents for follow up states she continues to have d/c from the L ear in the AM. If she doesn’t clean her ear it feels heavy and full.  [Nasal Congestion] : no nasal congestion

## 2022-12-05 NOTE — ASSESSMENT
[FreeTextEntry1] : Ms. VILLARREAL 62 year F w/ Hx of left mastoidectomy presents s/p left otorrhea, continues to have ear d/c in the AM \par \par Left Mastoiditis w/ L M&T\par -Rx: Flonase \par -Culture collected L ear, will call with results \par \par f/u prn and after pills finished\par consider revision mastoidectomy

## 2022-12-07 ENCOUNTER — NON-APPOINTMENT (OUTPATIENT)
Age: 62
End: 2022-12-07

## 2022-12-10 ENCOUNTER — NON-APPOINTMENT (OUTPATIENT)
Age: 62
End: 2022-12-10

## 2022-12-12 ENCOUNTER — NON-APPOINTMENT (OUTPATIENT)
Age: 62
End: 2022-12-12

## 2022-12-12 LAB — EAR NOSE AND THROAT CULTURE: NORMAL

## 2023-03-05 ENCOUNTER — APPOINTMENT (OUTPATIENT)
Dept: CT IMAGING | Facility: HOSPITAL | Age: 63
End: 2023-03-05

## 2023-03-05 ENCOUNTER — OUTPATIENT (OUTPATIENT)
Dept: OUTPATIENT SERVICES | Facility: HOSPITAL | Age: 63
LOS: 1 days | End: 2023-03-05
Payer: COMMERCIAL

## 2023-03-05 DIAGNOSIS — Z98.890 OTHER SPECIFIED POSTPROCEDURAL STATES: Chronic | ICD-10-CM

## 2023-03-05 DIAGNOSIS — Z90.49 ACQUIRED ABSENCE OF OTHER SPECIFIED PARTS OF DIGESTIVE TRACT: Chronic | ICD-10-CM

## 2023-03-05 PROCEDURE — 70480 CT ORBIT/EAR/FOSSA W/O DYE: CPT | Mod: 26

## 2023-03-05 PROCEDURE — 70480 CT ORBIT/EAR/FOSSA W/O DYE: CPT

## 2023-04-11 ENCOUNTER — NON-APPOINTMENT (OUTPATIENT)
Age: 63
End: 2023-04-11

## 2023-04-12 ENCOUNTER — TRANSCRIPTION ENCOUNTER (OUTPATIENT)
Age: 63
End: 2023-04-12

## 2023-04-13 ENCOUNTER — APPOINTMENT (OUTPATIENT)
Dept: NEUROSURGERY | Facility: CLINIC | Age: 63
End: 2023-04-13
Payer: COMMERCIAL

## 2023-04-13 VITALS
HEIGHT: 63 IN | SYSTOLIC BLOOD PRESSURE: 130 MMHG | BODY MASS INDEX: 33.66 KG/M2 | WEIGHT: 190 LBS | OXYGEN SATURATION: 97 % | RESPIRATION RATE: 17 BRPM | HEART RATE: 66 BPM | TEMPERATURE: 97 F | DIASTOLIC BLOOD PRESSURE: 82 MMHG

## 2023-04-13 DIAGNOSIS — H54.3 UNQUALIFIED VISUAL LOSS, BOTH EYES: ICD-10-CM

## 2023-04-13 DIAGNOSIS — H71.90 UNSPECIFIED CHOLESTEATOMA, UNSPECIFIED EAR: ICD-10-CM

## 2023-04-13 DIAGNOSIS — E23.6 OTHER DISORDERS OF PITUITARY GLAND: ICD-10-CM

## 2023-04-13 DIAGNOSIS — H91.92 UNSPECIFIED HEARING LOSS, LEFT EAR: ICD-10-CM

## 2023-04-13 PROCEDURE — 99203 OFFICE O/P NEW LOW 30 MIN: CPT

## 2023-04-13 NOTE — ASSESSMENT
[FreeTextEntry1] : PLAN:\par - See Dr. Falcon today as scheduled, op planning \par - Referral to ophthalmologist Dr. Keshav Uriarte\par \par I, Dr. Ramirez, personally performed the evaluation and management (E/M) services for this established patient who presents today with (a) new problem(s)/exacerbation of (an) existing condition(s). That E/M includes conducting the examination, assessing all new/exacerbated conditions, and establishing a new plan of care. Today, my ACP, Katherine Miles, was here to observe my evaluation and management services for this new problem/exacerbated condition to be followed going forward.\par

## 2023-04-13 NOTE — HISTORY OF PRESENT ILLNESS
[de-identified] : 63 y/o female with PMHx of left ear mastoiditis, s/p Mastoidectomy, cholesteatoma with prior surgeries 2006 and 2016 with Dr. Orlando, recent CT temporal bones with small encephalocele. \par Pt was referred by otolaryngologist Dr. Falcon. \par \par She endorses about 1 month of left ear drainage, clear in color followed by left ear tube removal with drainage improved following. \par Pt endorses left hearing loss for years. Endorses she feels her vision has been worsening. Has not seen ophthalmologist. \par Denies headaches, dizziness, balance issues. \par

## 2023-04-13 NOTE — PHYSICAL EXAM
[Oriented To Time, Place, And Person] : oriented to person, place, and time [Impaired Insight] : insight and judgment were intact [Affect] : the affect was normal [Memory Recent] : recent memory was not impaired [Motor Tone] : muscle tone was normal in all four extremities [Motor Strength] : muscle strength was normal in all four extremities [Sclera] : the sclera and conjunctiva were normal [PERRL With Normal Accommodation] : pupils were equal in size, round, reactive to light, with normal accommodation [Extraocular Movements] : extraocular movements were intact [Neck Appearance] : the appearance of the neck was normal [] : no respiratory distress [Respiration, Rhythm And Depth] : normal respiratory rhythm and effort [Abnormal Walk] : normal gait [Skin Color & Pigmentation] : normal skin color and pigmentation [FreeTextEntry5] : CN II-XII grossly intact with left hearing loss noted

## 2023-04-13 NOTE — REASON FOR VISIT
[New Patient Visit] : a new patient visit [Referred By: _________] : Patient was referred by [facility] [Family Member] : family member

## 2023-04-13 NOTE — REVIEW OF SYSTEMS
[Eyesight Problems] : eyesight problems [As Noted in HPI] : as noted in HPI [Fever] : no fever [Chills] : no chills [Confused or Disoriented] : no confusion [Memory Lapses or Loss] : no memory loss [Seizures] : no convulsions [Dizziness] : no dizziness [Chest Pain] : no chest pain [Palpitations] : no palpitations [Shortness Of Breath] : no shortness of breath [Cough] : no cough

## 2023-04-24 ENCOUNTER — NON-APPOINTMENT (OUTPATIENT)
Age: 63
End: 2023-04-24

## 2023-04-24 ENCOUNTER — APPOINTMENT (OUTPATIENT)
Dept: OPHTHALMOLOGY | Facility: CLINIC | Age: 63
End: 2023-04-24
Payer: COMMERCIAL

## 2023-04-24 PROCEDURE — 92004 COMPRE OPH EXAM NEW PT 1/>: CPT

## 2023-04-24 PROCEDURE — 92083 EXTENDED VISUAL FIELD XM: CPT

## 2023-04-24 PROCEDURE — 92134 CPTRZ OPH DX IMG PST SGM RTA: CPT

## 2023-04-28 ENCOUNTER — NON-APPOINTMENT (OUTPATIENT)
Age: 63
End: 2023-04-28

## 2023-05-16 ENCOUNTER — TRANSCRIPTION ENCOUNTER (OUTPATIENT)
Age: 63
End: 2023-05-16

## 2023-05-16 ENCOUNTER — INPATIENT (INPATIENT)
Facility: HOSPITAL | Age: 63
LOS: 9 days | Discharge: HOME HEALTH | DRG: 26 | End: 2023-05-26
Attending: NEUROLOGICAL SURGERY | Admitting: NEUROLOGICAL SURGERY
Payer: COMMERCIAL

## 2023-05-16 VITALS
TEMPERATURE: 98 F | WEIGHT: 187.39 LBS | HEART RATE: 60 BPM | RESPIRATION RATE: 18 BRPM | DIASTOLIC BLOOD PRESSURE: 82 MMHG | SYSTOLIC BLOOD PRESSURE: 119 MMHG | OXYGEN SATURATION: 96 %

## 2023-05-16 DIAGNOSIS — Z98.890 OTHER SPECIFIED POSTPROCEDURAL STATES: Chronic | ICD-10-CM

## 2023-05-16 DIAGNOSIS — I10 ESSENTIAL (PRIMARY) HYPERTENSION: ICD-10-CM

## 2023-05-16 DIAGNOSIS — Z90.49 ACQUIRED ABSENCE OF OTHER SPECIFIED PARTS OF DIGESTIVE TRACT: Chronic | ICD-10-CM

## 2023-05-16 LAB
ANION GAP SERPL CALC-SCNC: 11 MMOL/L — SIGNIFICANT CHANGE UP (ref 5–17)
APTT BLD: 34.2 SEC — SIGNIFICANT CHANGE UP (ref 27.5–35.5)
BLD GP AB SCN SERPL QL: NEGATIVE — SIGNIFICANT CHANGE UP
BUN SERPL-MCNC: 10 MG/DL — SIGNIFICANT CHANGE UP (ref 7–23)
CALCIUM SERPL-MCNC: 10.1 MG/DL — SIGNIFICANT CHANGE UP (ref 8.4–10.5)
CHLORIDE SERPL-SCNC: 103 MMOL/L — SIGNIFICANT CHANGE UP (ref 96–108)
CO2 SERPL-SCNC: 30 MMOL/L — SIGNIFICANT CHANGE UP (ref 22–31)
CREAT SERPL-MCNC: 0.52 MG/DL — SIGNIFICANT CHANGE UP (ref 0.5–1.3)
EGFR: 105 ML/MIN/1.73M2 — SIGNIFICANT CHANGE UP
GLUCOSE SERPL-MCNC: 94 MG/DL — SIGNIFICANT CHANGE UP (ref 70–99)
HCT VFR BLD CALC: 44.6 % — SIGNIFICANT CHANGE UP (ref 34.5–45)
HGB BLD-MCNC: 14.6 G/DL — SIGNIFICANT CHANGE UP (ref 11.5–15.5)
INR BLD: 1.03 — SIGNIFICANT CHANGE UP (ref 0.88–1.16)
MCHC RBC-ENTMCNC: 28.6 PG — SIGNIFICANT CHANGE UP (ref 27–34)
MCHC RBC-ENTMCNC: 32.7 GM/DL — SIGNIFICANT CHANGE UP (ref 32–36)
MCV RBC AUTO: 87.3 FL — SIGNIFICANT CHANGE UP (ref 80–100)
NRBC # BLD: 0 /100 WBCS — SIGNIFICANT CHANGE UP (ref 0–0)
PLATELET # BLD AUTO: 241 K/UL — SIGNIFICANT CHANGE UP (ref 150–400)
POTASSIUM SERPL-MCNC: 3.6 MMOL/L — SIGNIFICANT CHANGE UP (ref 3.5–5.3)
POTASSIUM SERPL-SCNC: 3.6 MMOL/L — SIGNIFICANT CHANGE UP (ref 3.5–5.3)
PROTHROM AB SERPL-ACNC: 12.3 SEC — SIGNIFICANT CHANGE UP (ref 10.5–13.4)
RBC # BLD: 5.11 M/UL — SIGNIFICANT CHANGE UP (ref 3.8–5.2)
RBC # FLD: 12.8 % — SIGNIFICANT CHANGE UP (ref 10.3–14.5)
RH IG SCN BLD-IMP: POSITIVE — SIGNIFICANT CHANGE UP
SODIUM SERPL-SCNC: 144 MMOL/L — SIGNIFICANT CHANGE UP (ref 135–145)
WBC # BLD: 7.02 K/UL — SIGNIFICANT CHANGE UP (ref 3.8–10.5)
WBC # FLD AUTO: 7.02 K/UL — SIGNIFICANT CHANGE UP (ref 3.8–10.5)

## 2023-05-16 PROCEDURE — 70450 CT HEAD/BRAIN W/O DYE: CPT | Mod: 26,MA

## 2023-05-16 PROCEDURE — 70450 CT HEAD/BRAIN W/O DYE: CPT | Mod: 26,77

## 2023-05-16 PROCEDURE — 99285 EMERGENCY DEPT VISIT HI MDM: CPT

## 2023-05-16 PROCEDURE — 99221 1ST HOSP IP/OBS SF/LOW 40: CPT

## 2023-05-16 RX ORDER — MECLIZINE HCL 12.5 MG
25 TABLET ORAL ONCE
Refills: 0 | Status: COMPLETED | OUTPATIENT
Start: 2023-05-16 | End: 2023-05-16

## 2023-05-16 RX ORDER — ONDANSETRON 8 MG/1
4 TABLET, FILM COATED ORAL EVERY 6 HOURS
Refills: 0 | Status: DISCONTINUED | OUTPATIENT
Start: 2023-05-16 | End: 2023-05-17

## 2023-05-16 RX ORDER — SENNA PLUS 8.6 MG/1
2 TABLET ORAL AT BEDTIME
Refills: 0 | Status: DISCONTINUED | OUTPATIENT
Start: 2023-05-16 | End: 2023-05-17

## 2023-05-16 RX ORDER — ACETAMINOPHEN 500 MG
975 TABLET ORAL ONCE
Refills: 0 | Status: COMPLETED | OUTPATIENT
Start: 2023-05-16 | End: 2023-05-16

## 2023-05-16 RX ORDER — SODIUM CHLORIDE 9 MG/ML
1000 INJECTION INTRAMUSCULAR; INTRAVENOUS; SUBCUTANEOUS
Refills: 0 | Status: DISCONTINUED | OUTPATIENT
Start: 2023-05-16 | End: 2023-05-17

## 2023-05-16 RX ORDER — AMLODIPINE BESYLATE 2.5 MG/1
1 TABLET ORAL
Qty: 0 | Refills: 0 | DISCHARGE

## 2023-05-16 RX ORDER — ROSUVASTATIN CALCIUM 5 MG/1
1 TABLET ORAL
Refills: 0 | DISCHARGE

## 2023-05-16 RX ORDER — LOSARTAN POTASSIUM 100 MG/1
1 TABLET, FILM COATED ORAL
Refills: 0 | DISCHARGE

## 2023-05-16 RX ORDER — LOSARTAN POTASSIUM 100 MG/1
50 TABLET, FILM COATED ORAL DAILY
Refills: 0 | Status: DISCONTINUED | OUTPATIENT
Start: 2023-05-16 | End: 2023-05-16

## 2023-05-16 RX ORDER — LANOLIN ALCOHOL/MO/W.PET/CERES
5 CREAM (GRAM) TOPICAL AT BEDTIME
Refills: 0 | Status: DISCONTINUED | OUTPATIENT
Start: 2023-05-16 | End: 2023-05-17

## 2023-05-16 RX ORDER — ATORVASTATIN CALCIUM 80 MG/1
40 TABLET, FILM COATED ORAL AT BEDTIME
Refills: 0 | Status: DISCONTINUED | OUTPATIENT
Start: 2023-05-16 | End: 2023-05-17

## 2023-05-16 RX ORDER — KETOROLAC TROMETHAMINE 30 MG/ML
15 SYRINGE (ML) INJECTION ONCE
Refills: 0 | Status: DISCONTINUED | OUTPATIENT
Start: 2023-05-16 | End: 2023-05-16

## 2023-05-16 RX ORDER — ACETAMINOPHEN 500 MG
650 TABLET ORAL EVERY 6 HOURS
Refills: 0 | Status: DISCONTINUED | OUTPATIENT
Start: 2023-05-16 | End: 2023-05-17

## 2023-05-16 RX ADMIN — Medication 975 MILLIGRAM(S): at 11:22

## 2023-05-16 RX ADMIN — SENNA PLUS 2 TABLET(S): 8.6 TABLET ORAL at 22:19

## 2023-05-16 RX ADMIN — Medication 975 MILLIGRAM(S): at 11:55

## 2023-05-16 RX ADMIN — Medication 25 MILLIGRAM(S): at 11:22

## 2023-05-16 NOTE — H&P ADULT - HISTORY OF PRESENT ILLNESS
HPI: 63  female comes to ER with complaint of severe left side head/face pain, intermittent dizziness, also intermittent left CSF otorrhea. She has hx of left cholesteatoma, chronic left mastoiditis  She had an eustachian ventilating tube placed in 2020 (?) which was removed about 1 month ago. She admits that since removal of  tube she is having CSF leakage from left ear and pressure like left side headache.  She states that today she had severe left side headache and left facial like pain and decided to come to ER. She requested to  speak  with Dr. Ramirez who is following her for CSF leak.   CT head is negative for acute pathology; no hemorrhage, no stroke, no hydrocephalus.

## 2023-05-16 NOTE — PATIENT PROFILE ADULT - FALL HARM RISK - UNIVERSAL INTERVENTIONS
Bed in lowest position, wheels locked, appropriate side rails in place/Call bell, personal items and telephone in reach/Instruct patient to call for assistance before getting out of bed or chair/Non-slip footwear when patient is out of bed/[place] to call system/Physically safe environment - no spills, clutter or unnecessary equipment/Purposeful Proactive Rounding/Room/bathroom lighting operational, light cord in reach

## 2023-05-16 NOTE — CONSULT NOTE ADULT - ASSESSMENT
62 yo F with PMHx HTN, HLD, cholesteatoma L ear (s/p surgical intervention, 2017), chronic L ear otorrhea px to [facility] ED on 5/16 with gradual onset of L-sided headache and L ear otorrhea of 1d duration admitted under neurosurgery with plan for L CSF otorrhea procedure. Medicine consulted for pre-operative clearance.     #Pre-operative clearance  - Pre-op labs (CBC, CMP, PT, PTT, INR, T/S)  - EKG  - CXR  - METS   - RCRI  - Da Silva score  - Patient deemed _ risk for _ risk surgery     **Incomplete Note**  **Incomplete Note**    Recommendations are final after attending attestation  62 yo F with PMHx HTN, HLD, cholesteatoma L ear (s/p surgical intervention, 2017), chronic L ear otorrhea px to [facility] ED on 5/16 with gradual onset of L-sided headache and L ear otorrhea of 1d duration admitted under neurosurgery with plan for L CSF otorrhea procedure. Medicine consulted for pre-operative clearance.     #Pre-operative clearance  - Pre-op labs (CBC, CMP, PT, PTT, INR, T/S)  - EKG  - CXR  - METS   - RCRI 0 points (Class I   - Da Silva score  - Patient deemed _ risk for _ risk surgery     **Incomplete Note**  **Incomplete Note**    Recommendations are final after attending attestation  62 yo F with PMHx HTN, HLD, cholesteatoma L ear (s/p surgical intervention, 2017), chronic L ear otorrhea px to [facility] ED on 5/16 with gradual onset of L-sided headache and L ear otorrhea of 1d duration admitted under neurosurgery with plan for L CSF otorrhea procedure. Medicine consulted for pre-operative clearance.     #Pre-operative clearance  - Pre-op labs (CBC, CMP, PT, PTT, INR, T/S)  - EKG  - METS   - RCRI 0 points (Class I Risk) ~ 3.9% for 30d risk of death, MI, or cardiac arrest   - Da Silva score 0.3% for risk of MI or cardiac arrest, intraoperatively or up to 30d post-op   - Patient deemed _ risk for moderate risk surgery     **Incomplete Note**  **Incomplete Note**    Recommendations are final after attending attestation  64 yo F with PMHx HTN, HLD, cholesteatoma L ear (s/p surgical intervention, 2017), chronic L ear otorrhea px to [facility] ED on 5/16 with gradual onset of L-sided headache and L ear otorrhea of 1d duration admitted under neurosurgery with plan for L temporal cranioplasty and EVD placement. Medicine consulted for pre-operative clearance.     #Pre-operative clearance  Multiple prior surgeries without complication. No significant cardiac hx. Home medications include losartan, statin. No hx of chest pain/SOB. METS >10. No respiratory sx/hx. Denies asthma, GREG. No prior adverse rxn to anesthesia in the past and no family hx of adverse rxn to anesthesia. Reporting anaphylaxis allergic rxn to penicillins.   - Pre-op labs (CBC, CMP, PT, PTT, INR, T/S)  - METS >10  - RCRI 0 points (Class I Risk) ~ 3.9% for 30d risk of death, MI, or cardiac arrest   - Da Silva score 0.3% for risk of MI or cardiac arrest, intraoperatively or up to 30d post-op   - Can hold Losartan guillermo-operatively   - Avoid PCN due to allergy (anaphylaxis)  - Patient deemed low risk for moderate risk surgery     Recommendations are final after attending attestation

## 2023-05-16 NOTE — ED PROVIDER NOTE - CLINICAL SUMMARY MEDICAL DECISION MAKING FREE TEXT BOX
Patient with gradual onset of headache with with dizziness described as vertigo since yesterday with chronic recurrent left ear otorrhea and presence of surgical treatment for left ear cholesteatoma in 2017.  Patient has normal neurologic examination, stable vital signs looks well.  Patient is requesting consultation with Dr. Ramirez.  Plan check labs CT head consult neurosurgery and reassess need for additional procedure treatment.  Will give meclizine, Tylenol and reassess symptoms as well.  Patient has normal neurologic examination.  Do not suspect stroke.

## 2023-05-16 NOTE — ED ADULT NURSE NOTE - OBJECTIVE STATEMENT
Pt presents to ED C/O worsening HA and ear drainage. Pt states " It's clear drainage and I feel pressure in my ears, it's making me dizzy, it's been happening for 20 years I see Dr. Ramirez to manage my symptoms".

## 2023-05-16 NOTE — CONSULT NOTE ADULT - SUBJECTIVE AND OBJECTIVE BOX
*** INTERNAL MEDICINE CONSULT NOTE ***    HPI:  64 yo F with PMHx HTN, HLD, cholesteatoma L ear (s/p surgical intervention, 2017), chronic L ear otorrhea px to [facility] ED on 5/16 with gradual onset of L-sided headache and L ear otorrhea of 1d duration admitted under neurosurgery with plan for L temporal craniotomy CSF otorrhea procedu. Medicine consulted for pre-operative clearance.     FAMILY HISTORY:  Family history of hypertension (Mother)      PAST MEDICAL & SURGICAL HISTORY:  Chronic serous otitis media  Hypertension  Hypercholesteremia  Obesity  Otorrhea of left ear  Trauma  cut with glass - right ring finger  CSF otorrhea  HLD (hyperlipidemia)  H/O myringotomy  s/p excision of osteoma, left myringotomy tube (2006) again in 2018  H/O nasal polypectomy  2013  History of appendectomy  1976      SOCIAL HISTORY:  Tobacco use:  EtOH use:  Illicit drug use:    REVIEW OF SYSTEMS: see HPI    MEDICATIONS:  MEDICATIONS  (STANDING):  atorvastatin 40 milliGRAM(s) Oral at bedtime  losartan 50 milliGRAM(s) Oral daily  senna 2 Tablet(s) Oral at bedtime  sodium chloride 0.9%. 1000 milliLiter(s) (75 mL/Hr) IV Continuous <Continuous>    MEDICATIONS  (PRN):  acetaminophen     Tablet .. 650 milliGRAM(s) Oral every 6 hours PRN Temp greater or equal to 38C (100.4F), Moderate Pain (4 - 6)  bisacodyl 5 milliGRAM(s) Oral daily PRN Constipation  ondansetron Injectable 4 milliGRAM(s) IV Push every 6 hours PRN Nausea and/or Vomiting      ALLERGIES:  Allergies  penicillins (Anaphylaxis)  dairy products (Angioedema)    Intolerances        VITAL SIGNS:  Vital Signs Last 24 Hrs  T(C): 36.7 (16 May 2023 16:54), Max: 36.7 (16 May 2023 10:49)  T(F): 98.1 (16 May 2023 16:54), Max: 98.1 (16 May 2023 16:54)  HR: 61 (16 May 2023 16:54) (60 - 61)  BP: 105/70 (16 May 2023 16:54) (105/70 - 119/82)  RR: 16 (16 May 2023 16:54) (16 - 18)  SpO2: 97% (16 May 2023 16:54) (96% - 97%)    Parameters below as of 16 May 2023 16:54  Patient On (Oxygen Delivery Method): room air    PHYSICAL EXAM:  Constitutional: WDWN resting comfortably in bed; NAD  Head: NC/AT  Eyes: PERRL, EOMI, anicteric sclera  ENT: no nasal discharge; uvula midline, no oropharyngeal erythema or exudates; MMM  Neck: supple; no JVD or thyromegaly  Respiratory: CTA B/L; no W/R/R, no retractions  Cardiac: +S1/S2; RRR; no M/R/G; PMI non-displaced  Gastrointestinal: abdomen soft, NT/ND; no rebound or guarding; +BSx4  Genitourinary: normal external genitalia  Back: spine midline, no bony tenderness or step-offs; no CVAT B/L  Extremities: WWP, no clubbing or cyanosis; no peripheral edema  Musculoskeletal: NROM x4; no joint swelling, tenderness or erythema  Vascular: 2+ radial, femoral, DP/PT pulses B/L  Dermatologic: skin warm, dry and intact; no rashes, wounds, or scars  Lymphatic: no submandibular or cervical LAD  Neurologic: AAOx3; CNII-XII grossly intact; no focal deficits  Psychiatric: affect and characteristics of appearance, verbalizations, behaviors are appropriate    LABS:                        14.6   7.02  )-----------( 241      ( 16 May 2023 15:16 )             44.6     05-16    144  |  103  |  10  ----------------------------<  94  3.6   |  30  |  0.52    Ca    10.1      16 May 2023 15:16        CAPILLARY BLOOD GLUCOSE      POCT Blood Glucose.: 101 mg/dL (16 May 2023 10:50)    RADIOLOGY & ADDITIONAL TESTS: Reviewed.    < from: CT Head No Cont (05.16.23 @ 11:33) >  The ventricles and sulci are within normal limits for patient's stated   age.  No acute intracranial hemorrhage, extra-axial fluid collection, mass   effect or midline shift..  Gray-white matter differentiation is preserved. There is an empty sella.  There is no acute calvarial fracture.  Again noted left canal wall up mastoidectomy. The paranasal sinuses are   well-aerated.   *** INTERNAL MEDICINE CONSULT NOTE ***    HPI:  62 yo F with PMHx HTN, HLD, cholesteatoma L ear (s/p surgical intervention, 2017), chronic L ear otorrhea px to [facility] ED on 5/16 with gradual onset of L-sided headache and L ear otorrhea of 1d duration admitted under neurosurgery with plan for L temporal craniotomy CSF otorrhea procedure. Medicine consulted for pre-operative clearance.     FAMILY HISTORY:  Family history of hypertension (Mother)      PAST MEDICAL & SURGICAL HISTORY:  Chronic serous otitis media  Hypertension  Hypercholesteremia  Obesity  Otorrhea of left ear  Trauma  cut with glass - right ring finger  CSF otorrhea  HLD (hyperlipidemia)  H/O myringotomy  s/p excision of osteoma, left myringotomy tube (2006) again in 2018  H/O nasal polypectomy  2013  History of appendectomy  1976      SOCIAL HISTORY:  Tobacco use:  EtOH use:  Illicit drug use:    REVIEW OF SYSTEMS: see HPI    MEDICATIONS:  MEDICATIONS  (STANDING):  atorvastatin 40 milliGRAM(s) Oral at bedtime  losartan 50 milliGRAM(s) Oral daily  senna 2 Tablet(s) Oral at bedtime  sodium chloride 0.9%. 1000 milliLiter(s) (75 mL/Hr) IV Continuous <Continuous>    MEDICATIONS  (PRN):  acetaminophen     Tablet .. 650 milliGRAM(s) Oral every 6 hours PRN Temp greater or equal to 38C (100.4F), Moderate Pain (4 - 6)  bisacodyl 5 milliGRAM(s) Oral daily PRN Constipation  ondansetron Injectable 4 milliGRAM(s) IV Push every 6 hours PRN Nausea and/or Vomiting      ALLERGIES:  Allergies  penicillins (Anaphylaxis)  dairy products (Angioedema)    Intolerances        VITAL SIGNS:  Vital Signs Last 24 Hrs  T(C): 36.7 (16 May 2023 16:54), Max: 36.7 (16 May 2023 10:49)  T(F): 98.1 (16 May 2023 16:54), Max: 98.1 (16 May 2023 16:54)  HR: 61 (16 May 2023 16:54) (60 - 61)  BP: 105/70 (16 May 2023 16:54) (105/70 - 119/82)  RR: 16 (16 May 2023 16:54) (16 - 18)  SpO2: 97% (16 May 2023 16:54) (96% - 97%)    Parameters below as of 16 May 2023 16:54  Patient On (Oxygen Delivery Method): room air    PHYSICAL EXAM:  Constitutional: WDWN resting comfortably in bed; NAD  Head: NC/AT  Eyes: PERRL, EOMI, anicteric sclera  ENT: no nasal discharge; uvula midline, no oropharyngeal erythema or exudates; MMM  Neck: supple; no JVD or thyromegaly  Respiratory: CTA B/L; no W/R/R, no retractions  Cardiac: +S1/S2; RRR; no M/R/G; PMI non-displaced  Gastrointestinal: abdomen soft, NT/ND; no rebound or guarding; +BSx4  Genitourinary: normal external genitalia  Back: spine midline, no bony tenderness or step-offs; no CVAT B/L  Extremities: WWP, no clubbing or cyanosis; no peripheral edema  Musculoskeletal: NROM x4; no joint swelling, tenderness or erythema  Vascular: 2+ radial, femoral, DP/PT pulses B/L  Dermatologic: skin warm, dry and intact; no rashes, wounds, or scars  Lymphatic: no submandibular or cervical LAD  Neurologic: AAOx3; CNII-XII grossly intact; no focal deficits  Psychiatric: affect and characteristics of appearance, verbalizations, behaviors are appropriate    LABS:                        14.6   7.02  )-----------( 241      ( 16 May 2023 15:16 )             44.6     05-16    144  |  103  |  10  ----------------------------<  94  3.6   |  30  |  0.52    Ca    10.1      16 May 2023 15:16        CAPILLARY BLOOD GLUCOSE      POCT Blood Glucose.: 101 mg/dL (16 May 2023 10:50)    RADIOLOGY & ADDITIONAL TESTS: Reviewed.    < from: CT Head No Cont (05.16.23 @ 11:33) >  The ventricles and sulci are within normal limits for patient's stated   age.  No acute intracranial hemorrhage, extra-axial fluid collection, mass   effect or midline shift..  Gray-white matter differentiation is preserved. There is an empty sella.  There is no acute calvarial fracture.  Again noted left canal wall up mastoidectomy. The paranasal sinuses are   well-aerated.   *** INTERNAL MEDICINE CONSULT NOTE ***    HPI:  62 yo F with PMHx HTN, HLD, cholesteatoma L ear (s/p surgical intervention, 2017), chronic L ear otorrhea px to [facility] ED on 5/16 with gradual onset of L-sided headache and L ear otorrhea of 1d duration admitted under neurosurgery with plan for L temporal craniotomy CSF otorrhea procedure. Medicine consulted for pre-operative clearance.     Pt reports hx of Eustachian tube in 2020, removed 1 month prior and has been having persistent CSF leakage from the L ear along with pressure-like L-sided headache prompting her to present to ED today. Neurosurgery consulted in ED with admission to Dr. Ramirez's service and plan for L temporal cranioplasty and EVD placement.     FAMILY HISTORY:  Family history of hypertension (Mother)      PAST MEDICAL & SURGICAL HISTORY:  Chronic serous otitis media  Hypertension  Hypercholesteremia  Obesity  Otorrhea of left ear  Trauma  cut with glass - right ring finger  CSF otorrhea  HLD (hyperlipidemia)  H/O myringotomy  s/p excision of osteoma, left myringotomy tube (2006) again in 2018  H/O nasal polypectomy  2013  History of appendectomy  1976    REVIEW OF SYSTEMS: see HPI    MEDICATIONS:  MEDICATIONS  (STANDING):  atorvastatin 40 milliGRAM(s) Oral at bedtime  losartan 50 milliGRAM(s) Oral daily  senna 2 Tablet(s) Oral at bedtime  sodium chloride 0.9%. 1000 milliLiter(s) (75 mL/Hr) IV Continuous <Continuous>    MEDICATIONS  (PRN):  acetaminophen     Tablet .. 650 milliGRAM(s) Oral every 6 hours PRN Temp greater or equal to 38C (100.4F), Moderate Pain (4 - 6)  bisacodyl 5 milliGRAM(s) Oral daily PRN Constipation  ondansetron Injectable 4 milliGRAM(s) IV Push every 6 hours PRN Nausea and/or Vomiting      ALLERGIES:  Allergies  penicillins (Anaphylaxis)  dairy products (Angioedema)    Intolerances        VITAL SIGNS:  Vital Signs Last 24 Hrs  T(C): 36.7 (16 May 2023 16:54), Max: 36.7 (16 May 2023 10:49)  T(F): 98.1 (16 May 2023 16:54), Max: 98.1 (16 May 2023 16:54)  HR: 61 (16 May 2023 16:54) (60 - 61)  BP: 105/70 (16 May 2023 16:54) (105/70 - 119/82)  RR: 16 (16 May 2023 16:54) (16 - 18)  SpO2: 97% (16 May 2023 16:54) (96% - 97%)    Parameters below as of 16 May 2023 16:54  Patient On (Oxygen Delivery Method): room air    PHYSICAL EXAM:  Constitutional: WDWN sitting up comfortably in hospital bed   Eyes: anicteric sclera  ENT: MMM  Neck: supple; no JVD   Respiratory: CTA B/L; no W/R/R, no retractions  Cardiac: +S1/S2; RRR; no M/R/G  Gastrointestinal: abdomen soft, NT/ND;   Extremities: WWP, no peripheral edema  Musculoskeletal: NROM x4;   Lymphatic: no submandibular or cervical LAD  Neurologic: AAOx3; CNII-XII grossly intact; no focal deficits  Psychiatric: affect and characteristics of appearance, verbalizations, behaviors are appropriate    LABS:                        14.6   7.02  )-----------( 241      ( 16 May 2023 15:16 )             44.6     05-16    144  |  103  |  10  ----------------------------<  94  3.6   |  30  |  0.52    Ca    10.1      16 May 2023 15:16        CAPILLARY BLOOD GLUCOSE      POCT Blood Glucose.: 101 mg/dL (16 May 2023 10:50)    RADIOLOGY & ADDITIONAL TESTS: Reviewed.    < from: CT Head No Cont (05.16.23 @ 11:33) >  The ventricles and sulci are within normal limits for patient's stated   age.  No acute intracranial hemorrhage, extra-axial fluid collection, mass   effect or midline shift..  Gray-white matter differentiation is preserved. There is an empty sella.  There is no acute calvarial fracture.  Again noted left canal wall up mastoidectomy. The paranasal sinuses are   well-aerated.   *** INTERNAL MEDICINE CONSULT NOTE ***    HPI:  62 yo F with PMHx HTN, HLD, cholesteatoma L ear (s/p surgical intervention, 2017), chronic L ear otorrhea px to [facility] ED on 5/16 with gradual onset of L-sided headache and L ear otorrhea of 1d duration admitted under neurosurgery with plan for L temporal craniotomy CSF otorrhea procedure. Medicine consulted for pre-operative clearance.     Pt reports hx of Eustachian tube in 2020, removed 1 month prior and has been having persistent CSF leakage from the L ear along with pressure-like L-sided headache prompting her to present to ED today. Neurosurgery consulted in ED with admission to Dr. Ramirez's service and plan for L temporal cranioplasty and EVD placement.     FAMILY HISTORY:  Family history of hypertension (Mother)      PAST MEDICAL & SURGICAL HISTORY:  Chronic serous otitis media  Hypertension  Hypercholesteremia  Obesity  Otorrhea of left ear  Trauma  cut with glass - right ring finger  CSF otorrhea  HLD (hyperlipidemia)  H/O myringotomy  s/p excision of osteoma, left myringotomy tube (2006) again in 2018  H/O nasal polypectomy  2013  History of appendectomy  1976    REVIEW OF SYSTEMS: see HPI    MEDICATIONS:  MEDICATIONS  (STANDING):  atorvastatin 40 milliGRAM(s) Oral at bedtime  losartan 50 milliGRAM(s) Oral daily  senna 2 Tablet(s) Oral at bedtime  sodium chloride 0.9%. 1000 milliLiter(s) (75 mL/Hr) IV Continuous <Continuous>    MEDICATIONS  (PRN):  acetaminophen     Tablet .. 650 milliGRAM(s) Oral every 6 hours PRN Temp greater or equal to 38C (100.4F), Moderate Pain (4 - 6)  bisacodyl 5 milliGRAM(s) Oral daily PRN Constipation  ondansetron Injectable 4 milliGRAM(s) IV Push every 6 hours PRN Nausea and/or Vomiting      ALLERGIES:  Allergies  penicillins (Anaphylaxis)  dairy products (Angioedema)    Intolerances        VITAL SIGNS:  Vital Signs Last 24 Hrs  T(C): 36.7 (16 May 2023 16:54), Max: 36.7 (16 May 2023 10:49)  T(F): 98.1 (16 May 2023 16:54), Max: 98.1 (16 May 2023 16:54)  HR: 61 (16 May 2023 16:54) (60 - 61)  BP: 105/70 (16 May 2023 16:54) (105/70 - 119/82)  RR: 16 (16 May 2023 16:54) (16 - 18)  SpO2: 97% (16 May 2023 16:54) (96% - 97%)    Parameters below as of 16 May 2023 16:54  Patient On (Oxygen Delivery Method): room air    PHYSICAL EXAM:  Constitutional: WDWN sitting up comfortably in hospital bed   Eyes: anicteric sclera  ENT: MMM  Neck: supple; no JVD   Respiratory: CTA B/L; no W/R/R, no retractions  Cardiac: +S1/S2; RRR; no M/R/G  Gastrointestinal: abdomen soft, NT/ND;   Extremities: WWP, no peripheral edema  Musculoskeletal: NROM x4;   Neurologic: AAOx3; no focal deficits    LABS:                        14.6   7.02  )-----------( 241      ( 16 May 2023 15:16 )             44.6     05-16    144  |  103  |  10  ----------------------------<  94  3.6   |  30  |  0.52    Ca    10.1      16 May 2023 15:16        CAPILLARY BLOOD GLUCOSE      POCT Blood Glucose.: 101 mg/dL (16 May 2023 10:50)    RADIOLOGY & ADDITIONAL TESTS: Reviewed.    < from: CT Head No Cont (05.16.23 @ 11:33) >  The ventricles and sulci are within normal limits for patient's stated   age.  No acute intracranial hemorrhage, extra-axial fluid collection, mass   effect or midline shift..  Gray-white matter differentiation is preserved. There is an empty sella.  There is no acute calvarial fracture.  Again noted left canal wall up mastoidectomy. The paranasal sinuses are   well-aerated.

## 2023-05-16 NOTE — ED ADULT NURSE NOTE - NSICDXPASTSURGICALHX_GEN_ALL_CORE_FT
PAST SURGICAL HISTORY:  H/O myringotomy s/p excision of osteoma, left myringotomy tube (2006) again in 2018    H/O nasal polypectomy 2013    History of appendectomy 1976

## 2023-05-16 NOTE — ED PROVIDER NOTE - NSICDXFAMILYHX_GEN_ALL_CORE_FT
FAMILY HISTORY:  Mother  Still living? Yes, Estimated age: 81-90  Family history of hypertension, Age at diagnosis: Age Unknown

## 2023-05-16 NOTE — ED ADULT NURSE NOTE - PRO INTERPRETER NEED 2
What Type Of Note Output Would You Prefer (Optional)?: Bullet Format
Hpi Title: Evaluation of Skin Lesions
How Severe Are Your Spot(S)?: mild
English

## 2023-05-16 NOTE — H&P ADULT - NSICDXPASTMEDICALHX_GEN_ALL_CORE_FT
PAST MEDICAL HISTORY:  Chronic serous otitis media     CSF otorrhea     HLD (hyperlipidemia)     Hypercholesteremia     Hypertension     Obesity     Otorrhea of left ear     Trauma cut with glass - right ring finger

## 2023-05-16 NOTE — ED ADULT NURSE NOTE - AS PAIN REST
Date of Service: 12/03/2018    PREOPERATIVE DIAGNOSIS:  Right femoral pseudoaneurysm.    POSTOPERATIVE DIAGNOSIS:  Right femoral pseudoaneurysm.    NATURE OF OPERATION:  Right groin exploration, repair of right common femoral artery pseudoaneurysm, evacuation of hematoma, Prevena VAC placement.    SURGEON:  Dr. Justin Weber.    ASSISTANT(S):   SA Apoorva Delacruz SA    ANESTHESIA:  General.    FINDINGS:  A tear in the common femoral artery, large hematoma.    INDICATIONS:  This is a 62-year-old lady with multiple comorbidities who had cardiac catheterization and the procedure performed about a week ago, developed a right groin pseudoaneurysm, which on multiple duplexes was found to be unsuitable for thrombin injection.  Therefore, after discussion of risks, benefits and alternatives, she was brought to the operating room for surgery.      DESCRIPTION OF PROCEDURE:   Bilateral groins were prepped and draped in standard sterile fashion, timeout was performed, site and side of surgery was confirmed.  Prophylactic antibiotics were given.    Procedure was started with making a longitudinal skin incision in the right inguinal area, deepened down with electrocautery.  Sharp dissection was performed.  Inguinal ligament was retracted cephalad.  I was trying to expose the distal external iliac artery, proximal common femoral artery, but entered into the hematoma cavity and had acute pulsatile bleeding, controlled with manual pressure with a finger.  Cephalad dissection was performed.  Distal external iliac artery was dissected bluntly and some sharp dissection and encircled with vessel loop and the patient was systemically heparinized and then clamped.  The arterial hole was repaired temporarily with 5-0 Prolene.    Then, further distal clavicle resection was performed.  Profunda femoris artery and SFA were sharply dissected, encircled with vessel loop for distal control.  There was significant scarring in the  area, making it difficult for the dissection from previous thrombectomy surgery.  After proximal distal control were obtained and then sharp dissection was performed and common femoral artery was dissected to evaluate for the injury.  Previously placed stitch was removed and then it was evaluated.  There was a transverse tear in the artery, which was repaired with 6-0 Prolene in standard fashion.  Before completion the suture line, forward and back bleeding performed and suture line completed.  Blood flow restored.  There was good hemostasis.  Another hemostatic 6-0 Prolene stitch was required.  Wound was thoroughly irrigated.    Then, on the medial side, there was an area a little distally, which was probably from the venous access, that was also repaired with 5-0 Prolene to prevent any bleeding from the venous access site.  A hematoma was evacuated, about 150 mL and then wound was thoroughly irrigated, hemostasis achieved with Surgicel and thrombin.  After satisfactory hemostasis was achieved, the wound was closed in layers, first with 2-0 Vicryl over the artery.  Then, a FLORENCIO drain was placed into the hematoma cavity and then another 2-0 layer and then 2-0 Prolene.  Prevena VAC was applied and the patient transferred to ICU for further care.        Dictated By: Justin Weber MD  Signing Provider: Justin Weber MD    RG/msc ([id number])  DD: 12/03/2018 16:15:12 TD: 12/03/2018 17:50:16    Copy Sent To:    6 (moderate pain)

## 2023-05-16 NOTE — ED ADULT NURSE NOTE - NSICDXPASTMEDICALHX_GEN_ALL_CORE_FT
PAST MEDICAL HISTORY:  Chronic serous otitis media     Hypercholesteremia     Hypertension     Obesity     Otorrhea of left ear     Trauma cut with glass - right ring finger

## 2023-05-16 NOTE — ED PROVIDER NOTE - OBJECTIVE STATEMENT
63-year-old female with history of hypertension hyperlipidemia and cholesteatoma to left ear status post surgery in 2017 with chronic intermittent left ear otorrhea, now presenting with gradual onset of left-sided headache nonradiating in nature focused around left ear with ongoing otorrhea since yesterday.  Headache is not worst of life or thunderclap.  There is been no fevers chills, photophobia, neck stiffness, nausea or vomiting.  Patient also has additional symptom of feeling dizziness described as vertigo worse with certain positions.  Patient denies changes in gait or balance.  Going to triage nurse patient complained of left-sided weakness but patient describes diffuse body weakness and no focal unilateral weakness.

## 2023-05-16 NOTE — H&P ADULT - ASSESSMENT
63 years old female with left otorrhea admitted from ED with severe left sided headache and intermittent dizzinesss for Left craniotomy for repair of  left otorrhea, intra op EVD placement  on05/17/23.

## 2023-05-16 NOTE — H&P ADULT - PROBLEM SELECTOR PLAN 2
- Pre op for left temporal cranioplasty for repair of left CSF otorrhea, EVD [placement.  - Medical clearance.  - ENT consult..

## 2023-05-16 NOTE — H&P ADULT - NSHPLABSRESULTS_GEN_ALL_CORE
14.6   7.02  )-----------( 241      ( 16 May 2023 15:16 )             44.6   05-16    144  |  103  |  10  ----------------------------<  94  3.6   |  30  |  0.52    Ca    10.1      16 May 2023 15:16

## 2023-05-16 NOTE — H&P ADULT - NSHPPHYSICALEXAM_GEN_ALL_CORE
Gen: well developed, well groomed female in no apparent distress.   Neuro: A&O x 3, PERRL, EOMI, CN II to XII are grossly intact.  No pronator drift.  Ears: No discharge noted.  Eyes: No gross visual deficit.   Heart: S1S2,   Lung: Good air entry, clear lung sound.  GI: obese, Soft, non tender, +BS.  Motor: No focal motor deficit. 5/5 x4  No sensory deficit to touch.  Ext: no edema, 2+ distal pulses.

## 2023-05-17 ENCOUNTER — TRANSCRIPTION ENCOUNTER (OUTPATIENT)
Age: 63
End: 2023-05-17

## 2023-05-17 ENCOUNTER — RESULT REVIEW (OUTPATIENT)
Age: 63
End: 2023-05-17

## 2023-05-17 LAB
ALBUMIN SERPL ELPH-MCNC: 3.7 G/DL — SIGNIFICANT CHANGE UP (ref 3.3–5)
ALBUMIN SERPL ELPH-MCNC: 4.3 G/DL — SIGNIFICANT CHANGE UP (ref 3.3–5)
ALP SERPL-CCNC: 41 U/L — SIGNIFICANT CHANGE UP (ref 40–120)
ALP SERPL-CCNC: 51 U/L — SIGNIFICANT CHANGE UP (ref 40–120)
ALT FLD-CCNC: 16 U/L — SIGNIFICANT CHANGE UP (ref 10–45)
ALT FLD-CCNC: 19 U/L — SIGNIFICANT CHANGE UP (ref 10–45)
ANION GAP SERPL CALC-SCNC: 11 MMOL/L — SIGNIFICANT CHANGE UP (ref 5–17)
ANION GAP SERPL CALC-SCNC: 6 MMOL/L — SIGNIFICANT CHANGE UP (ref 5–17)
APPEARANCE CSF: SIGNIFICANT CHANGE UP
APPEARANCE SPUN FLD: COLORLESS — SIGNIFICANT CHANGE UP
APTT BLD: 30.2 SEC — SIGNIFICANT CHANGE UP (ref 27.5–35.5)
AST SERPL-CCNC: 14 U/L — SIGNIFICANT CHANGE UP (ref 10–40)
AST SERPL-CCNC: 18 U/L — SIGNIFICANT CHANGE UP (ref 10–40)
BASOPHILS # BLD AUTO: 0.01 K/UL — SIGNIFICANT CHANGE UP (ref 0–0.2)
BASOPHILS NFR BLD AUTO: 0.1 % — SIGNIFICANT CHANGE UP (ref 0–2)
BILIRUB SERPL-MCNC: 0.5 MG/DL — SIGNIFICANT CHANGE UP (ref 0.2–1.2)
BILIRUB SERPL-MCNC: 0.7 MG/DL — SIGNIFICANT CHANGE UP (ref 0.2–1.2)
BLD GP AB SCN SERPL QL: NEGATIVE — SIGNIFICANT CHANGE UP
BUN SERPL-MCNC: 6 MG/DL — LOW (ref 7–23)
BUN SERPL-MCNC: 9 MG/DL — SIGNIFICANT CHANGE UP (ref 7–23)
CALCIUM SERPL-MCNC: 7.6 MG/DL — LOW (ref 8.4–10.5)
CALCIUM SERPL-MCNC: 8.6 MG/DL — SIGNIFICANT CHANGE UP (ref 8.4–10.5)
CHLORIDE SERPL-SCNC: 106 MMOL/L — SIGNIFICANT CHANGE UP (ref 96–108)
CHLORIDE SERPL-SCNC: 111 MMOL/L — HIGH (ref 96–108)
CO2 SERPL-SCNC: 25 MMOL/L — SIGNIFICANT CHANGE UP (ref 22–31)
CO2 SERPL-SCNC: 26 MMOL/L — SIGNIFICANT CHANGE UP (ref 22–31)
COLOR CSF: SIGNIFICANT CHANGE UP
CREAT SERPL-MCNC: 0.5 MG/DL — SIGNIFICANT CHANGE UP (ref 0.5–1.3)
CREAT SERPL-MCNC: 0.54 MG/DL — SIGNIFICANT CHANGE UP (ref 0.5–1.3)
CSF COMMENTS: SIGNIFICANT CHANGE UP
EGFR: 104 ML/MIN/1.73M2 — SIGNIFICANT CHANGE UP
EGFR: 106 ML/MIN/1.73M2 — SIGNIFICANT CHANGE UP
EOSINOPHIL # BLD AUTO: 0.02 K/UL — SIGNIFICANT CHANGE UP (ref 0–0.5)
EOSINOPHIL NFR BLD AUTO: 0.2 % — SIGNIFICANT CHANGE UP (ref 0–6)
GLUCOSE BLDC GLUCOMTR-MCNC: 126 MG/DL — HIGH (ref 70–99)
GLUCOSE CSF-MCNC: 65 MG/DL — SIGNIFICANT CHANGE UP (ref 40–70)
GLUCOSE SERPL-MCNC: 111 MG/DL — HIGH (ref 70–99)
GLUCOSE SERPL-MCNC: 119 MG/DL — HIGH (ref 70–99)
GRAM STN FLD: SIGNIFICANT CHANGE UP
HCT VFR BLD CALC: 37.2 % — SIGNIFICANT CHANGE UP (ref 34.5–45)
HCT VFR BLD CALC: 44.3 % — SIGNIFICANT CHANGE UP (ref 34.5–45)
HCV AB S/CO SERPL IA: 1.81 S/CO — HIGH (ref 0–0.99)
HCV AB SERPL-IMP: ABNORMAL
HCV RNA FLD QL NAA+PROBE: SIGNIFICANT CHANGE UP
HCV RNA SPEC QL PROBE+SIG AMP: SIGNIFICANT CHANGE UP
HGB BLD-MCNC: 12 G/DL — SIGNIFICANT CHANGE UP (ref 11.5–15.5)
HGB BLD-MCNC: 14.3 G/DL — SIGNIFICANT CHANGE UP (ref 11.5–15.5)
IMM GRANULOCYTES NFR BLD AUTO: 0.4 % — SIGNIFICANT CHANGE UP (ref 0–0.9)
INR BLD: 1.12 — SIGNIFICANT CHANGE UP (ref 0.88–1.16)
LYMPHOCYTES # BLD AUTO: 1.38 K/UL — SIGNIFICANT CHANGE UP (ref 1–3.3)
LYMPHOCYTES # BLD AUTO: 15.2 % — SIGNIFICANT CHANGE UP (ref 13–44)
MAGNESIUM SERPL-MCNC: 1.7 MG/DL — SIGNIFICANT CHANGE UP (ref 1.6–2.6)
MAGNESIUM SERPL-MCNC: 2.2 MG/DL — SIGNIFICANT CHANGE UP (ref 1.6–2.6)
MCHC RBC-ENTMCNC: 28.4 PG — SIGNIFICANT CHANGE UP (ref 27–34)
MCHC RBC-ENTMCNC: 28.7 PG — SIGNIFICANT CHANGE UP (ref 27–34)
MCHC RBC-ENTMCNC: 32.3 GM/DL — SIGNIFICANT CHANGE UP (ref 32–36)
MCHC RBC-ENTMCNC: 32.3 GM/DL — SIGNIFICANT CHANGE UP (ref 32–36)
MCV RBC AUTO: 88.1 FL — SIGNIFICANT CHANGE UP (ref 80–100)
MCV RBC AUTO: 89 FL — SIGNIFICANT CHANGE UP (ref 80–100)
MONOCYTES # BLD AUTO: 0.24 K/UL — SIGNIFICANT CHANGE UP (ref 0–0.9)
MONOCYTES NFR BLD AUTO: 2.6 % — SIGNIFICANT CHANGE UP (ref 2–14)
NEUTROPHILS # BLD AUTO: 7.41 K/UL — HIGH (ref 1.8–7.4)
NEUTROPHILS # CSF: 0 % — SIGNIFICANT CHANGE UP (ref 0–6)
NEUTROPHILS NFR BLD AUTO: 81.5 % — HIGH (ref 43–77)
NRBC # BLD: 0 /100 WBCS — SIGNIFICANT CHANGE UP (ref 0–0)
NRBC # BLD: 0 /100 WBCS — SIGNIFICANT CHANGE UP (ref 0–0)
NRBC NFR CSF: 0 /UL — SIGNIFICANT CHANGE UP (ref 0–5)
PHOSPHATE SERPL-MCNC: 3.1 MG/DL — SIGNIFICANT CHANGE UP (ref 2.5–4.5)
PHOSPHATE SERPL-MCNC: 3.4 MG/DL — SIGNIFICANT CHANGE UP (ref 2.5–4.5)
PLATELET # BLD AUTO: 204 K/UL — SIGNIFICANT CHANGE UP (ref 150–400)
PLATELET # BLD AUTO: 238 K/UL — SIGNIFICANT CHANGE UP (ref 150–400)
POTASSIUM SERPL-MCNC: 3.4 MMOL/L — LOW (ref 3.5–5.3)
POTASSIUM SERPL-MCNC: 3.6 MMOL/L — SIGNIFICANT CHANGE UP (ref 3.5–5.3)
POTASSIUM SERPL-SCNC: 3.4 MMOL/L — LOW (ref 3.5–5.3)
POTASSIUM SERPL-SCNC: 3.6 MMOL/L — SIGNIFICANT CHANGE UP (ref 3.5–5.3)
PROT CSF-MCNC: 47 MG/DL — HIGH (ref 15–45)
PROT SERPL-MCNC: 5.6 G/DL — LOW (ref 6–8.3)
PROT SERPL-MCNC: 6.5 G/DL — SIGNIFICANT CHANGE UP (ref 6–8.3)
PROTHROM AB SERPL-ACNC: 13.4 SEC — SIGNIFICANT CHANGE UP (ref 10.5–13.4)
RBC # BLD: 4.18 M/UL — SIGNIFICANT CHANGE UP (ref 3.8–5.2)
RBC # BLD: 5.03 M/UL — SIGNIFICANT CHANGE UP (ref 3.8–5.2)
RBC # CSF: 750 /UL — HIGH (ref 0–0)
RBC # FLD: 12.8 % — SIGNIFICANT CHANGE UP (ref 10.3–14.5)
RBC # FLD: 13.1 % — SIGNIFICANT CHANGE UP (ref 10.3–14.5)
RH IG SCN BLD-IMP: POSITIVE — SIGNIFICANT CHANGE UP
SODIUM SERPL-SCNC: 142 MMOL/L — SIGNIFICANT CHANGE UP (ref 135–145)
SODIUM SERPL-SCNC: 143 MMOL/L — SIGNIFICANT CHANGE UP (ref 135–145)
SPECIMEN SOURCE: SIGNIFICANT CHANGE UP
TUBE TYPE: SIGNIFICANT CHANGE UP
WBC # BLD: 6.7 K/UL — SIGNIFICANT CHANGE UP (ref 3.8–10.5)
WBC # BLD: 9.1 K/UL — SIGNIFICANT CHANGE UP (ref 3.8–10.5)
WBC # FLD AUTO: 6.7 K/UL — SIGNIFICANT CHANGE UP (ref 3.8–10.5)
WBC # FLD AUTO: 9.1 K/UL — SIGNIFICANT CHANGE UP (ref 3.8–10.5)

## 2023-05-17 PROCEDURE — 61210 BURR HOLE IMPLT VENTR CATH: CPT | Mod: 59

## 2023-05-17 PROCEDURE — 61781 SCAN PROC CRANIAL INTRA: CPT

## 2023-05-17 PROCEDURE — 99291 CRITICAL CARE FIRST HOUR: CPT

## 2023-05-17 PROCEDURE — 61592 ORBITOCRANIAL APPROACH/SKULL: CPT | Mod: LT

## 2023-05-17 PROCEDURE — 61608 RESECT/EXCISE CRANIAL LESION: CPT | Mod: 62,22

## 2023-05-17 PROCEDURE — 88304 TISSUE EXAM BY PATHOLOGIST: CPT | Mod: 26

## 2023-05-17 PROCEDURE — 71045 X-RAY EXAM CHEST 1 VIEW: CPT | Mod: 26

## 2023-05-17 PROCEDURE — 61608 RESECT/EXCISE CRANIAL LESION: CPT | Mod: 62

## 2023-05-17 PROCEDURE — 14021 TIS TRNFR S/A/L 10.1-30 SQCM: CPT

## 2023-05-17 DEVICE — CATH EVD BACTISEAL: Type: IMPLANTABLE DEVICE | Site: LEFT | Status: FUNCTIONAL

## 2023-05-17 DEVICE — MATRIX DURAGEN PLUS DURAL REGENERATION 3X3: Type: IMPLANTABLE DEVICE | Site: LEFT | Status: FUNCTIONAL

## 2023-05-17 DEVICE — SURGICEL 4 X 8": Type: IMPLANTABLE DEVICE | Site: LEFT | Status: FUNCTIONAL

## 2023-05-17 DEVICE — SCREW UN3 AXS SELF DRILL 1.5X4MM: Type: IMPLANTABLE DEVICE | Site: LEFT | Status: FUNCTIONAL

## 2023-05-17 DEVICE — CONN SHUNT STR PLSET STER 1X1.9: Type: IMPLANTABLE DEVICE | Site: LEFT | Status: FUNCTIONAL

## 2023-05-17 DEVICE — SURGIFOAM PAD 8CM X 12.5CM X 10MM (100): Type: IMPLANTABLE DEVICE | Site: LEFT | Status: FUNCTIONAL

## 2023-05-17 DEVICE — SURGICEL FIBRILLAR 4 X 4": Type: IMPLANTABLE DEVICE | Site: LEFT | Status: FUNCTIONAL

## 2023-05-17 DEVICE — PLATE UN3 DOUBLE-Y 6 HOLE W/BAR: Type: IMPLANTABLE DEVICE | Site: LEFT | Status: FUNCTIONAL

## 2023-05-17 DEVICE — DVC ADHERUS AUTOSPRAY W/ DURAL SEALANT EXT TIP: Type: IMPLANTABLE DEVICE | Site: LEFT | Status: FUNCTIONAL

## 2023-05-17 DEVICE — TACHOSIL 4.8 X 4.8CM: Type: IMPLANTABLE DEVICE | Site: LEFT | Status: FUNCTIONAL

## 2023-05-17 RX ORDER — HYDROMORPHONE HYDROCHLORIDE 2 MG/ML
0.5 INJECTION INTRAMUSCULAR; INTRAVENOUS; SUBCUTANEOUS ONCE
Refills: 0 | Status: DISCONTINUED | OUTPATIENT
Start: 2023-05-17 | End: 2023-05-17

## 2023-05-17 RX ORDER — DEXTROSE 50 % IN WATER 50 %
15 SYRINGE (ML) INTRAVENOUS ONCE
Refills: 0 | Status: DISCONTINUED | OUTPATIENT
Start: 2023-05-17 | End: 2023-05-17

## 2023-05-17 RX ORDER — OXYCODONE HYDROCHLORIDE 5 MG/1
10 TABLET ORAL EVERY 4 HOURS
Refills: 0 | Status: DISCONTINUED | OUTPATIENT
Start: 2023-05-17 | End: 2023-05-18

## 2023-05-17 RX ORDER — OFLOXACIN OTIC SOLUTION 3 MG/ML
3 SOLUTION/ DROPS AURICULAR (OTIC) THREE TIMES A DAY
Refills: 0 | Status: COMPLETED | OUTPATIENT
Start: 2023-05-17 | End: 2023-05-20

## 2023-05-17 RX ORDER — ONDANSETRON 8 MG/1
4 TABLET, FILM COATED ORAL EVERY 6 HOURS
Refills: 0 | Status: DISCONTINUED | OUTPATIENT
Start: 2023-05-17 | End: 2023-05-19

## 2023-05-17 RX ORDER — CHLORHEXIDINE GLUCONATE 213 G/1000ML
1 SOLUTION TOPICAL ONCE
Refills: 0 | Status: COMPLETED | OUTPATIENT
Start: 2023-05-17 | End: 2023-05-17

## 2023-05-17 RX ORDER — SENNA PLUS 8.6 MG/1
2 TABLET ORAL AT BEDTIME
Refills: 0 | Status: DISCONTINUED | OUTPATIENT
Start: 2023-05-17 | End: 2023-05-17

## 2023-05-17 RX ORDER — AZTREONAM 2 G
VIAL (EA) INJECTION
Refills: 0 | Status: DISCONTINUED | OUTPATIENT
Start: 2023-05-17 | End: 2023-05-17

## 2023-05-17 RX ORDER — GLUCAGON INJECTION, SOLUTION 0.5 MG/.1ML
1 INJECTION, SOLUTION SUBCUTANEOUS ONCE
Refills: 0 | Status: DISCONTINUED | OUTPATIENT
Start: 2023-05-17 | End: 2023-05-17

## 2023-05-17 RX ORDER — OXYCODONE HYDROCHLORIDE 5 MG/1
5 TABLET ORAL EVERY 4 HOURS
Refills: 0 | Status: DISCONTINUED | OUTPATIENT
Start: 2023-05-17 | End: 2023-05-19

## 2023-05-17 RX ORDER — ACETAMINOPHEN 500 MG
650 TABLET ORAL EVERY 6 HOURS
Refills: 0 | Status: DISCONTINUED | OUTPATIENT
Start: 2023-05-17 | End: 2023-05-17

## 2023-05-17 RX ORDER — DEXTROSE 50 % IN WATER 50 %
25 SYRINGE (ML) INTRAVENOUS ONCE
Refills: 0 | Status: DISCONTINUED | OUTPATIENT
Start: 2023-05-17 | End: 2023-05-17

## 2023-05-17 RX ORDER — AZTREONAM 2 G
2000 VIAL (EA) INJECTION EVERY 8 HOURS
Refills: 0 | Status: COMPLETED | OUTPATIENT
Start: 2023-05-17 | End: 2023-05-18

## 2023-05-17 RX ORDER — METRONIDAZOLE 500 MG
500 TABLET ORAL EVERY 8 HOURS
Refills: 0 | Status: DISCONTINUED | OUTPATIENT
Start: 2023-05-17 | End: 2023-05-18

## 2023-05-17 RX ORDER — ONDANSETRON 8 MG/1
4 TABLET, FILM COATED ORAL EVERY 6 HOURS
Refills: 0 | Status: DISCONTINUED | OUTPATIENT
Start: 2023-05-17 | End: 2023-05-17

## 2023-05-17 RX ORDER — SENNA PLUS 8.6 MG/1
2 TABLET ORAL AT BEDTIME
Refills: 0 | Status: DISCONTINUED | OUTPATIENT
Start: 2023-05-17 | End: 2023-05-24

## 2023-05-17 RX ORDER — LABETALOL HCL 100 MG
10 TABLET ORAL
Refills: 0 | Status: DISCONTINUED | OUTPATIENT
Start: 2023-05-17 | End: 2023-05-20

## 2023-05-17 RX ORDER — DEXTROSE 50 % IN WATER 50 %
12.5 SYRINGE (ML) INTRAVENOUS ONCE
Refills: 0 | Status: DISCONTINUED | OUTPATIENT
Start: 2023-05-17 | End: 2023-05-17

## 2023-05-17 RX ORDER — VANCOMYCIN HCL 1 G
1250 VIAL (EA) INTRAVENOUS ONCE
Refills: 0 | Status: COMPLETED | OUTPATIENT
Start: 2023-05-18 | End: 2023-05-18

## 2023-05-17 RX ORDER — ACETAMINOPHEN 500 MG
1000 TABLET ORAL EVERY 6 HOURS
Refills: 0 | Status: DISCONTINUED | OUTPATIENT
Start: 2023-05-17 | End: 2023-05-18

## 2023-05-17 RX ORDER — POTASSIUM CHLORIDE 20 MEQ
40 PACKET (EA) ORAL EVERY 4 HOURS
Refills: 0 | Status: COMPLETED | OUTPATIENT
Start: 2023-05-17 | End: 2023-05-18

## 2023-05-17 RX ORDER — HYDROMORPHONE HYDROCHLORIDE 2 MG/ML
0.5 INJECTION INTRAMUSCULAR; INTRAVENOUS; SUBCUTANEOUS
Refills: 0 | Status: DISCONTINUED | OUTPATIENT
Start: 2023-05-17 | End: 2023-05-20

## 2023-05-17 RX ORDER — SODIUM CHLORIDE 9 MG/ML
1000 INJECTION, SOLUTION INTRAVENOUS
Refills: 0 | Status: DISCONTINUED | OUTPATIENT
Start: 2023-05-17 | End: 2023-05-17

## 2023-05-17 RX ORDER — INSULIN LISPRO 100/ML
VIAL (ML) SUBCUTANEOUS
Refills: 0 | Status: DISCONTINUED | OUTPATIENT
Start: 2023-05-17 | End: 2023-05-19

## 2023-05-17 RX ORDER — POLYETHYLENE GLYCOL 3350 17 G/17G
17 POWDER, FOR SOLUTION ORAL DAILY
Refills: 0 | Status: DISCONTINUED | OUTPATIENT
Start: 2023-05-17 | End: 2023-05-19

## 2023-05-17 RX ORDER — POVIDONE-IODINE 5 %
1 AEROSOL (ML) TOPICAL ONCE
Refills: 0 | Status: COMPLETED | OUTPATIENT
Start: 2023-05-17 | End: 2023-05-17

## 2023-05-17 RX ORDER — ATORVASTATIN CALCIUM 80 MG/1
40 TABLET, FILM COATED ORAL AT BEDTIME
Refills: 0 | Status: DISCONTINUED | OUTPATIENT
Start: 2023-05-17 | End: 2023-05-20

## 2023-05-17 RX ORDER — SODIUM CHLORIDE 9 MG/ML
1000 INJECTION INTRAMUSCULAR; INTRAVENOUS; SUBCUTANEOUS
Refills: 0 | Status: DISCONTINUED | OUTPATIENT
Start: 2023-05-17 | End: 2023-05-18

## 2023-05-17 RX ADMIN — HYDROMORPHONE HYDROCHLORIDE 0.5 MILLIGRAM(S): 2 INJECTION INTRAMUSCULAR; INTRAVENOUS; SUBCUTANEOUS at 21:52

## 2023-05-17 RX ADMIN — Medication 100 MILLIGRAM(S): at 23:38

## 2023-05-17 RX ADMIN — HYDROMORPHONE HYDROCHLORIDE 0.5 MILLIGRAM(S): 2 INJECTION INTRAMUSCULAR; INTRAVENOUS; SUBCUTANEOUS at 21:00

## 2023-05-17 RX ADMIN — CHLORHEXIDINE GLUCONATE 1 APPLICATION(S): 213 SOLUTION TOPICAL at 06:00

## 2023-05-17 RX ADMIN — OXYCODONE HYDROCHLORIDE 5 MILLIGRAM(S): 5 TABLET ORAL at 23:23

## 2023-05-17 RX ADMIN — HYDROMORPHONE HYDROCHLORIDE 0.5 MILLIGRAM(S): 2 INJECTION INTRAMUSCULAR; INTRAVENOUS; SUBCUTANEOUS at 22:18

## 2023-05-17 RX ADMIN — HYDROMORPHONE HYDROCHLORIDE 0.5 MILLIGRAM(S): 2 INJECTION INTRAMUSCULAR; INTRAVENOUS; SUBCUTANEOUS at 20:34

## 2023-05-17 RX ADMIN — Medication 100 MILLIGRAM(S): at 22:06

## 2023-05-17 RX ADMIN — Medication 40 MILLIEQUIVALENT(S): at 22:06

## 2023-05-17 RX ADMIN — OFLOXACIN OTIC SOLUTION 3 DROP(S): 3 SOLUTION/ DROPS AURICULAR (OTIC) at 21:52

## 2023-05-17 RX ADMIN — SODIUM CHLORIDE 75 MILLILITER(S): 9 INJECTION INTRAMUSCULAR; INTRAVENOUS; SUBCUTANEOUS at 00:00

## 2023-05-17 RX ADMIN — Medication 1 APPLICATION(S): at 10:57

## 2023-05-17 NOTE — PROGRESS NOTE ADULT - ASSESSMENT
ASSESSMENT/PLAN:  POD 0 s/p L temp crani for CSF leak repair w/ ENT. L EVD placement.     NEURO:  Q1 neurochecks  CT Terrence protocol in am  Analgesia prn  EVD open @5cmH20. Monitor ICPs/output/color.   SG GRECIA- monitor output.   Activity: [] mobilize as tolerated [x] Bedrest [] PT [] OT [] PMNR    PULM:  Wean to room air  Incentive spirometry    CV:  SBP goal 100-140  On losartan at home. Resume if needed  Continue lipitor    RENAL:  Fluids: IVL once tolerating  DC Oliveira in am  Replete lytes prn    GI:  Diet: Dysphagia and advance as tolerated  GI prophylaxis [x] not indicated [] PPI [] other:  Bowel regimen [] colace [] senna [] other:    ENDO:   Goal euglycemia (-180)    HEME/ONC:  VTE prophylaxis: [x] SCDs [] chemoprophylaxis   [x] hold chemoprophylaxis due to: fresh post op    ID:  Afebrile    MISC:    SOCIAL/FAMILY:  [x] awaiting [] updated at bedside [] family meeting    CODE STATUS:  [x] Full Code [] DNR [] DNI [] Palliative/Comfort Care    DISPOSITION:  [x] ICU [] Stroke Unit [] Floor [] EMU [] RCU [] PCU    Time spent: 60 critical care minutes ASSESSMENT/PLAN:  POD 0 s/p L temp crani for CSF leak repair w/ ENT. L EVD placement.     NEURO:  Q1 neurochecks  CT Terrence protocol in am  Analgesia prn  EVD open @5cmH20. Monitor ICPs/output/color.   SG GRECIA- monitor output.   Activity: [] mobilize as tolerated [x] Bedrest [] PT [] OT [] PMNR    PULM:  Wean to room air  Incentive spirometry    CV:  SBP goal 100-140  On losartan at home. Resume if needed  Continue lipitor    RENAL:  Fluids: IVL once tolerating  DC Oliveira in am  Replete lytes prn    GI:  Diet: Dysphagia and advance as tolerated  GI prophylaxis [x] not indicated [] PPI [] other:  Bowel regimen [] colace [] senna [] other:    ENDO:   Goal euglycemia (-180)    HEME/ONC:  VTE prophylaxis: [x] SCDs [] chemoprophylaxis   [x] hold chemoprophylaxis due to: fresh post op    ID:  Afebrile  Abx: Empiric meningitic + anaerobic coverage per ENT/NSGY  Pt has anaphylaxis to PCN. Family endorsed the same.   Vancomycin/ aztreonam/flagyl until culture data/further discussions with ENT  ID consulted    MISC:    SOCIAL/FAMILY:  [x] awaiting [] updated at bedside [] family meeting    CODE STATUS:  [x] Full Code [] DNR [] DNI [] Palliative/Comfort Care    DISPOSITION:  [x] ICU [] Stroke Unit [] Floor [] EMU [] RCU [] PCU    Time spent: 60 critical care minutes

## 2023-05-17 NOTE — PRE-ANESTHESIA EVALUATION ADULT - NSANTHADDINFOFT_GEN_ALL_CORE
General anesthesia, arterial line, ICU admission discussed.  All questions answered.  Safety emphasized.

## 2023-05-17 NOTE — PROGRESS NOTE ADULT - ASSESSMENT
64 yo F with PMHx HTN, HLD, cholesteatoma L ear (s/p surgical intervention, 2017), chronic L ear otorrhea px to [facility] ED on 5/16 with gradual onset of L-sided headache and L ear otorrhea of 1d duration admitted under neurosurgery with plan for L temporal cranioplasty and EVD placement.     #Left craniotomy for repair of left otorrhea and  intra op EVD placement  -Medicine consulted for pre-operative clearance and 5/16. Per Medical consult on 5/15 the pt was deemed  low risk for mod risk procedure  - Management as per neurosurgery team     #HTN/HLD  -Continue statin   -Continue to hold Losartan guillermo-operatively     #DISPO  -As per primary team

## 2023-05-17 NOTE — PROGRESS NOTE ADULT - SUBJECTIVE AND OBJECTIVE BOX
HPI:  HPI: 62 y/o female with PMH of HTN, HLD. She presented to ER with complaint of severe left side head/face pain, intermittent dizziness, also intermittent left CSF otorrhea. She has hx of left cholesteatoma, chronic left mastoiditis  She had an eustachian ventilating tube placed in 2020 (?) which was removed about 1 month ago. She admits that since removal of  tube she is having CSF leakage from left ear and pressure like left side headache.  She states that today she had severe left side headache and left facial like pain and decided to come to ER. She requested to  speak  with Dr. Ramirez who is following her for CSF leak.   CT head is negative for acute pathology; no hemorrhage, no stroke, no hydrocephalus. (16 May 2023 16:15)    On admission, the patient was:  GCS: 15  Hunt-Deleon:  modified Ellis:  ICH score:  NIHSS:      ICU Vital Signs Last 24 Hrs  T(C): 36.9 (17 May 2023 21:44), Max: 37 (17 May 2023 20:15)  T(F): 98.5 (17 May 2023 21:44), Max: 98.6 (17 May 2023 20:15)  HR: 64 (17 May 2023 22:00) (52 - 70)  BP: 121/67 (17 May 2023 22:00) (110/62 - 137/88)  BP(mean): 89 (17 May 2023 22:00) (76 - 89)  ABP: 118/58 (17 May 2023 22:00) (118/58 - 134/70)  ABP(mean): 81 (17 May 2023 22:00) (79 - 93)  RR: 15 (17 May 2023 22:00) (9 - 17)  SpO2: 94% (17 May 2023 22:00) (94% - 99%)      05-17-23 @ 07:01  -  05-17-23 @ 22:28  --------------------------------------------------------  IN: 410 mL / OUT: 729 mL / NET: -319 mL      EXAMINATION:  General: Calm. Immediate post op  HEENT:  MMM  Neuro: Awake, alert, oriented x 3  Pupils 3mm and reactive, EOMI  Follows commands, moves all extremities 5/5 strength  Cards: S1/S2, RRR  Respiratory: Clear, no wheeze  Abdomen: Soft, non- tender  Extremities:  No edema  Skin:  Warm/dry      MEDICATIONS:   acetaminophen   IVPB .. 1000 milliGRAM(s) IV Intermittent every 6 hours PRN  atorvastatin 40 milliGRAM(s) Oral at bedtime  aztreonam  IVPB      bisacodyl 5 milliGRAM(s) Oral daily PRN  dextrose 5%. 1000 milliLiter(s) (50 mL/Hr) IV Continuous <Continuous>  dextrose 5%. 1000 milliLiter(s) (100 mL/Hr) IV Continuous <Continuous>  dextrose 50% Injectable 25 Gram(s) IV Push once  dextrose 50% Injectable 12.5 Gram(s) IV Push once  dextrose 50% Injectable 25 Gram(s) IV Push once  dextrose Oral Gel 15 Gram(s) Oral once PRN  glucagon  Injectable 1 milliGRAM(s) IntraMuscular once  HYDROmorphone  Injectable 0.5 milliGRAM(s) IV Push every 3 hours PRN  insulin lispro (ADMELOG) corrective regimen sliding scale   SubCutaneous Before meals and at bedtime  labetalol Injectable 10 milliGRAM(s) IV Push every 2 hours PRN  metroNIDAZOLE  IVPB 500 milliGRAM(s) IV Intermittent every 8 hours  ofloxacin 0.3% Ophthalmic Solution for OTIC Use 3 Drop(s) Left Ear three times a day  ondansetron Injectable 4 milliGRAM(s) IV Push every 6 hours PRN  polyethylene glycol 3350 17 Gram(s) Oral daily  potassium chloride    Tablet ER 40 milliEquivalent(s) Oral every 4 hours  senna 2 Tablet(s) Oral at bedtime  sodium chloride 0.9%. 1000 milliLiter(s) (80 mL/Hr) IV Continuous <Continuous>      LABS:                     12.0   9.10  )-----------( 204      ( 17 May 2023 20:54 )             37.2     05-17    142  |  111<H>  |  6<L>  ----------------------------<  119<H>  3.4<L>   |  25  |  0.54    Ca    7.6<L>      17 May 2023 20:54  Phos  3.1     05-17  Mg     1.7     05-17    TPro  5.6<L>  /  Alb  3.7  /  TBili  0.5  /  DBili  x   /  AST  14  /  ALT  16  /  AlkPhos  41  05-17    LIVER FUNCTIONS - ( 17 May 2023 20:54 )  Alb: 3.7 g/dL / Pro: 5.6 g/dL / ALK PHOS: 41 U/L / ALT: 16 U/L / AST: 14 U/L / GGT: x               Culture - CSF with Gram Stain (collected 05-17-23 @ 19:30)  Source: .CSF csf or spec  Gram Stain (05-17-23 @ 21:40):    No organisms seen    Rare WBC's

## 2023-05-17 NOTE — PROGRESS NOTE ADULT - SUBJECTIVE AND OBJECTIVE BOX
Surgery: L temporal craniotomy for L otorrhea repair with EVD placement and ANTONI navigation   Consent: Signed by patient in chart                    NAME/NUMBER of HCP: Millie Mason (daughter): [phone]     penicillins (Anaphylaxis)  dairy products (Angioedema)      OVERNIGHT EVENTS:    T(C): 36.4 (05-16-23 @ 21:09), Max: 36.7 (05-16-23 @ 10:49)  HR: 56 (05-16-23 @ 21:09) (56 - 63)  BP: 112/76 (05-16-23 @ 21:09) (105/70 - 119/82)  RR: 16 (05-16-23 @ 21:09) (16 - 18)  SpO2: 96% (05-16-23 @ 21:09) (96% - 97%)  Wt(kg): --      EXAM:  General: NAD, pt is comfortably sitting up in bed, A&O x3, on RA  HEENT: CN II-XII grossly intact, PERRL 3mm, EOMI b/l, face symmetric, tongue midline, neck FROM  Cardiovascular: RRR, normal S1 and S2   Respiratory: lungs CTAB, no wheezing, rhonchi, or crackles   GI: normoactive BS to auscultation, abd soft, NTND   Neuro: no aphasia, speech clear, no dysmetria, no pronator drift  strength 5/5 throughout all 4 extremities  sensation intact to light touch throughout   Extremities: distal pulses 2+ x4         05-16    144  |  103  |  10  ----------------------------<  94  3.6   |  30  |  0.52    Ca    10.1      16 May 2023 15:16      CBC Full  -  ( 16 May 2023 15:16 )  WBC Count : 7.02 K/uL  RBC Count : 5.11 M/uL  Hemoglobin : 14.6 g/dL  Hematocrit : 44.6 %  Platelet Count - Automated : 241 K/uL  Mean Cell Volume : 87.3 fl  Mean Cell Hemoglobin : 28.6 pg  Mean Cell Hemoglobin Concentration : 32.7 gm/dL  Auto Neutrophil # : x  Auto Lymphocyte # : x  Auto Monocyte # : x  Auto Eosinophil # : x  Auto Basophil # : x  Auto Neutrophil % : x  Auto Lymphocyte % : x  Auto Monocyte % : x  Auto Eosinophil % : x  Auto Basophil % : x    PT/INR - ( 16 May 2023 18:32 )   PT: 12.3 sec;   INR: 1.03          PTT - ( 16 May 2023 18:32 )  PTT:34.2 sec    Pregnancy test: N/A   Type & Screen (in past 72hrs):   Type + Screen (05.16.23 @ 18:33)   ABO Interpretation: O  Rh Interpretation: Positive  Antibody Screen: Negative    CXR: pend am   EKG: in chart   ECHO: N/A   Medical Clearances: pend am med clearance confirmation   Other Clearances:     Last dose of antiplatelet/anticoagulation drug: N/A     Implanted Devices (pacemaker, drug pump...etc):  []YES   [X] NO                  If yes --> EPS consulted to interrogate/adjust device:    3M nasal swab ordered?  X_Y  _N  Cranial surgery: Order written for hair to be shampooed night before surgery  [X] yes   []no                 Assessment:  63 years old female with left otorrhea admitted from ED with severe left sided headache and intermittent dizzinesss for Left craniotomy for repair of  left otorrhea, intra op EVD placement  on 05/17/23.    Plan:  - NPO after midnight   - IVF while NPO   - 3M and chg wipes ordered   - 2U PRBC on hold for OR   - consents signed and in chart   - med clearance pend am   - [facility] antoni preop completed   - shampoo patient's scalp night prior to OR and place shampoo cap on call to OR   - type and screen and coags completed     Assessment and plan discussed with Dr. Ramirez     Assessment:  Present when checked    []  GCS  E   V  M     Heart Failure: []Acute, [] acute on chronic , []chronic  Heart Failure:  [] Diastolic (HFpEF), [] Systolic (HFrEF), []Combined (HFpEF and HFrEF), [] RHF, [] Pulm HTN, [] Other    [] MARY, [] ATN, [] AIN, [] other  [] CKD1, [] CKD2, [] CKD 3, [] CKD 4, [] CKD 5, []ESRD    Encephalopathy: [] Metabolic, [] Hepatic, [] toxic, [] Neurological, [] Other    Abnormal Nurtitional Status: [] malnurtition (see nutrition note), [ ]underweight: BMI < 19, [] morbid obesity: BMI >40, [] Cachexia    [] Sepsis  [] hypovolemic shock,[] cardiogenic shock, [] hemorrhagic shock, [] neuogenic shock  [] Acute Respiratory Failure  []Cerebral edema, [] Brain compression/ herniation,   [] Functional quadriplegia  [] Acute blood loss anemia

## 2023-05-17 NOTE — PROGRESS NOTE ADULT - SUBJECTIVE AND OBJECTIVE BOX
Patient was seen and examined at bedside. Pt reports that she is feeling very anxious this morning. Pt denied having any headache.     OBJECTIVE:  Vital Signs Last 24 Hrs  T(C): 36.7 (17 May 2023 08:31), Max: 36.7 (16 May 2023 16:54)  T(F): 98 (17 May 2023 08:31), Max: 98.1 (16 May 2023 16:54)  HR: 52 (17 May 2023 08:31) (52 - 63)  BP: 135/83 (17 May 2023 08:31) (105/70 - 137/88)  BP(mean): --  RR: 17 (17 May 2023 08:31) (16 - 18)  SpO2: 96% (17 May 2023 08:31) (96% - 97%)    Parameters below as of 17 May 2023 08:31  Patient On (Oxygen Delivery Method): room air      PHYSICAL EXAM:  Gen: NAD laying in bed; appears comfortable   CV: RRR, +S1/S2, no mumur  Pulm: CTA b/l no wheezing or crackles   Abd: soft, NTND + BS no rebound or guarding       LABS:                        14.3   6.70  )-----------( 238      ( 17 May 2023 07:51 )             44.3     05-17    143  |  106  |  9   ----------------------------<  111<H>  3.6   |  26  |  0.50    Ca    8.6      17 May 2023 07:51  Phos  3.4     05-17  Mg     2.2     05-17    TPro  6.5  /  Alb  4.3  /  TBili  0.7  /  DBili  x   /  AST  18  /  ALT  19  /  AlkPhos  51  05-17    LIVER FUNCTIONS - ( 17 May 2023 07:51 )  Alb: 4.3 g/dL / Pro: 6.5 g/dL / ALK PHOS: 51 U/L / ALT: 19 U/L / AST: 18 U/L / GGT: x           PT/INR - ( 16 May 2023 18:32 )   PT: 12.3 sec;   INR: 1.03          PTT - ( 16 May 2023 18:32 )  PTT:34.2 sec      acetaminophen     Tablet .. 650 milliGRAM(s) Oral every 6 hours PRN  atorvastatin 40 milliGRAM(s) Oral at bedtime  bisacodyl 5 milliGRAM(s) Oral daily PRN  melatonin 5 milliGRAM(s) Oral at bedtime  ondansetron Injectable 4 milliGRAM(s) IV Push every 6 hours PRN  senna 2 Tablet(s) Oral at bedtime  sodium chloride 0.9%. 1000 milliLiter(s) IV Continuous <Continuous>

## 2023-05-18 LAB
A1C WITH ESTIMATED AVERAGE GLUCOSE RESULT: 5.3 % — SIGNIFICANT CHANGE UP (ref 4–5.6)
ANION GAP SERPL CALC-SCNC: 7 MMOL/L — SIGNIFICANT CHANGE UP (ref 5–17)
BUN SERPL-MCNC: 6 MG/DL — LOW (ref 7–23)
CALCIUM SERPL-MCNC: 7.6 MG/DL — LOW (ref 8.4–10.5)
CHLORIDE SERPL-SCNC: 106 MMOL/L — SIGNIFICANT CHANGE UP (ref 96–108)
CO2 SERPL-SCNC: 24 MMOL/L — SIGNIFICANT CHANGE UP (ref 22–31)
CREAT SERPL-MCNC: 0.45 MG/DL — LOW (ref 0.5–1.3)
EGFR: 109 ML/MIN/1.73M2 — SIGNIFICANT CHANGE UP
ESTIMATED AVERAGE GLUCOSE: 105 MG/DL — SIGNIFICANT CHANGE UP (ref 68–114)
GLUCOSE BLDC GLUCOMTR-MCNC: 112 MG/DL — HIGH (ref 70–99)
GLUCOSE BLDC GLUCOMTR-MCNC: 112 MG/DL — HIGH (ref 70–99)
GLUCOSE BLDC GLUCOMTR-MCNC: 142 MG/DL — HIGH (ref 70–99)
GLUCOSE BLDC GLUCOMTR-MCNC: 93 MG/DL — SIGNIFICANT CHANGE UP (ref 70–99)
GLUCOSE SERPL-MCNC: 188 MG/DL — HIGH (ref 70–99)
HCT VFR BLD CALC: 34.8 % — SIGNIFICANT CHANGE UP (ref 34.5–45)
HGB BLD-MCNC: 11.5 G/DL — SIGNIFICANT CHANGE UP (ref 11.5–15.5)
MAGNESIUM SERPL-MCNC: 1.7 MG/DL — SIGNIFICANT CHANGE UP (ref 1.6–2.6)
MCHC RBC-ENTMCNC: 29 PG — SIGNIFICANT CHANGE UP (ref 27–34)
MCHC RBC-ENTMCNC: 33 GM/DL — SIGNIFICANT CHANGE UP (ref 32–36)
MCV RBC AUTO: 87.9 FL — SIGNIFICANT CHANGE UP (ref 80–100)
NRBC # BLD: 0 /100 WBCS — SIGNIFICANT CHANGE UP (ref 0–0)
PHOSPHATE SERPL-MCNC: 2.6 MG/DL — SIGNIFICANT CHANGE UP (ref 2.5–4.5)
PLATELET # BLD AUTO: 191 K/UL — SIGNIFICANT CHANGE UP (ref 150–400)
POTASSIUM SERPL-MCNC: 3.8 MMOL/L — SIGNIFICANT CHANGE UP (ref 3.5–5.3)
POTASSIUM SERPL-SCNC: 3.8 MMOL/L — SIGNIFICANT CHANGE UP (ref 3.5–5.3)
RBC # BLD: 3.96 M/UL — SIGNIFICANT CHANGE UP (ref 3.8–5.2)
RBC # FLD: 12.9 % — SIGNIFICANT CHANGE UP (ref 10.3–14.5)
SODIUM SERPL-SCNC: 137 MMOL/L — SIGNIFICANT CHANGE UP (ref 135–145)
VANCOMYCIN TROUGH SERPL-MCNC: 6.5 UG/ML — LOW (ref 10–20)
WBC # BLD: 10.54 K/UL — HIGH (ref 3.8–10.5)
WBC # FLD AUTO: 10.54 K/UL — HIGH (ref 3.8–10.5)

## 2023-05-18 PROCEDURE — 99222 1ST HOSP IP/OBS MODERATE 55: CPT

## 2023-05-18 PROCEDURE — 99291 CRITICAL CARE FIRST HOUR: CPT

## 2023-05-18 PROCEDURE — 71045 X-RAY EXAM CHEST 1 VIEW: CPT | Mod: 26

## 2023-05-18 PROCEDURE — 70450 CT HEAD/BRAIN W/O DYE: CPT | Mod: 26

## 2023-05-18 RX ORDER — TRAMADOL HYDROCHLORIDE 50 MG/1
50 TABLET ORAL EVERY 6 HOURS
Refills: 0 | Status: DISCONTINUED | OUTPATIENT
Start: 2023-05-18 | End: 2023-05-19

## 2023-05-18 RX ORDER — POTASSIUM PHOSPHATE, MONOBASIC POTASSIUM PHOSPHATE, DIBASIC 236; 224 MG/ML; MG/ML
30 INJECTION, SOLUTION INTRAVENOUS ONCE
Refills: 0 | Status: COMPLETED | OUTPATIENT
Start: 2023-05-18 | End: 2023-05-18

## 2023-05-18 RX ORDER — TRAMADOL HYDROCHLORIDE 50 MG/1
25 TABLET ORAL EVERY 6 HOURS
Refills: 0 | Status: DISCONTINUED | OUTPATIENT
Start: 2023-05-18 | End: 2023-05-19

## 2023-05-18 RX ORDER — ACETAMINOPHEN 500 MG
650 TABLET ORAL EVERY 6 HOURS
Refills: 0 | Status: DISCONTINUED | OUTPATIENT
Start: 2023-05-18 | End: 2023-05-24

## 2023-05-18 RX ORDER — VANCOMYCIN HCL 1 G
1250 VIAL (EA) INTRAVENOUS EVERY 12 HOURS
Refills: 0 | Status: DISCONTINUED | OUTPATIENT
Start: 2023-05-18 | End: 2023-05-19

## 2023-05-18 RX ORDER — CEFEPIME 1 G/1
2000 INJECTION, POWDER, FOR SOLUTION INTRAMUSCULAR; INTRAVENOUS EVERY 8 HOURS
Refills: 0 | Status: DISCONTINUED | OUTPATIENT
Start: 2023-05-18 | End: 2023-05-19

## 2023-05-18 RX ORDER — LEVETIRACETAM 250 MG/1
500 TABLET, FILM COATED ORAL
Refills: 0 | Status: DISCONTINUED | OUTPATIENT
Start: 2023-05-18 | End: 2023-05-24

## 2023-05-18 RX ORDER — MAGNESIUM SULFATE 500 MG/ML
2 VIAL (ML) INJECTION ONCE
Refills: 0 | Status: COMPLETED | OUTPATIENT
Start: 2023-05-18 | End: 2023-05-18

## 2023-05-18 RX ORDER — SCOPALAMINE 1 MG/3D
1 PATCH, EXTENDED RELEASE TRANSDERMAL
Refills: 0 | Status: DISCONTINUED | OUTPATIENT
Start: 2023-05-18 | End: 2023-05-18

## 2023-05-18 RX ADMIN — CEFEPIME 100 MILLIGRAM(S): 1 INJECTION, POWDER, FOR SOLUTION INTRAMUSCULAR; INTRAVENOUS at 13:00

## 2023-05-18 RX ADMIN — Medication 40 MILLIEQUIVALENT(S): at 02:44

## 2023-05-18 RX ADMIN — HYDROMORPHONE HYDROCHLORIDE 0.5 MILLIGRAM(S): 2 INJECTION INTRAMUSCULAR; INTRAVENOUS; SUBCUTANEOUS at 04:56

## 2023-05-18 RX ADMIN — Medication 650 MILLIGRAM(S): at 16:15

## 2023-05-18 RX ADMIN — Medication 650 MILLIGRAM(S): at 10:17

## 2023-05-18 RX ADMIN — HYDROMORPHONE HYDROCHLORIDE 0.5 MILLIGRAM(S): 2 INJECTION INTRAMUSCULAR; INTRAVENOUS; SUBCUTANEOUS at 19:25

## 2023-05-18 RX ADMIN — HYDROMORPHONE HYDROCHLORIDE 0.5 MILLIGRAM(S): 2 INJECTION INTRAMUSCULAR; INTRAVENOUS; SUBCUTANEOUS at 14:45

## 2023-05-18 RX ADMIN — OXYCODONE HYDROCHLORIDE 5 MILLIGRAM(S): 5 TABLET ORAL at 00:00

## 2023-05-18 RX ADMIN — TRAMADOL HYDROCHLORIDE 50 MILLIGRAM(S): 50 TABLET ORAL at 20:47

## 2023-05-18 RX ADMIN — POTASSIUM PHOSPHATE, MONOBASIC POTASSIUM PHOSPHATE, DIBASIC 83.33 MILLIMOLE(S): 236; 224 INJECTION, SOLUTION INTRAVENOUS at 08:19

## 2023-05-18 RX ADMIN — LEVETIRACETAM 500 MILLIGRAM(S): 250 TABLET, FILM COATED ORAL at 18:23

## 2023-05-18 RX ADMIN — ONDANSETRON 4 MILLIGRAM(S): 8 TABLET, FILM COATED ORAL at 04:55

## 2023-05-18 RX ADMIN — SODIUM CHLORIDE 80 MILLILITER(S): 9 INJECTION INTRAMUSCULAR; INTRAVENOUS; SUBCUTANEOUS at 02:53

## 2023-05-18 RX ADMIN — CEFEPIME 100 MILLIGRAM(S): 1 INJECTION, POWDER, FOR SOLUTION INTRAMUSCULAR; INTRAVENOUS at 21:54

## 2023-05-18 RX ADMIN — Medication 650 MILLIGRAM(S): at 23:34

## 2023-05-18 RX ADMIN — Medication 650 MILLIGRAM(S): at 17:34

## 2023-05-18 RX ADMIN — Medication 25 GRAM(S): at 07:29

## 2023-05-18 RX ADMIN — HYDROMORPHONE HYDROCHLORIDE 0.5 MILLIGRAM(S): 2 INJECTION INTRAMUSCULAR; INTRAVENOUS; SUBCUTANEOUS at 20:00

## 2023-05-18 RX ADMIN — OFLOXACIN OTIC SOLUTION 3 DROP(S): 3 SOLUTION/ DROPS AURICULAR (OTIC) at 15:24

## 2023-05-18 RX ADMIN — OXYCODONE HYDROCHLORIDE 5 MILLIGRAM(S): 5 TABLET ORAL at 08:00

## 2023-05-18 RX ADMIN — OFLOXACIN OTIC SOLUTION 3 DROP(S): 3 SOLUTION/ DROPS AURICULAR (OTIC) at 21:55

## 2023-05-18 RX ADMIN — OFLOXACIN OTIC SOLUTION 3 DROP(S): 3 SOLUTION/ DROPS AURICULAR (OTIC) at 05:26

## 2023-05-18 RX ADMIN — Medication 166.67 MILLIGRAM(S): at 15:23

## 2023-05-18 RX ADMIN — HYDROMORPHONE HYDROCHLORIDE 0.5 MILLIGRAM(S): 2 INJECTION INTRAMUSCULAR; INTRAVENOUS; SUBCUTANEOUS at 13:12

## 2023-05-18 RX ADMIN — Medication 100 MILLIGRAM(S): at 05:25

## 2023-05-18 RX ADMIN — Medication 166.67 MILLIGRAM(S): at 02:51

## 2023-05-18 RX ADMIN — ONDANSETRON 4 MILLIGRAM(S): 8 TABLET, FILM COATED ORAL at 12:54

## 2023-05-18 RX ADMIN — SENNA PLUS 2 TABLET(S): 8.6 TABLET ORAL at 21:55

## 2023-05-18 RX ADMIN — TRAMADOL HYDROCHLORIDE 50 MILLIGRAM(S): 50 TABLET ORAL at 21:30

## 2023-05-18 RX ADMIN — Medication 650 MILLIGRAM(S): at 11:00

## 2023-05-18 RX ADMIN — HYDROMORPHONE HYDROCHLORIDE 0.5 MILLIGRAM(S): 2 INJECTION INTRAMUSCULAR; INTRAVENOUS; SUBCUTANEOUS at 05:11

## 2023-05-18 RX ADMIN — OXYCODONE HYDROCHLORIDE 5 MILLIGRAM(S): 5 TABLET ORAL at 07:34

## 2023-05-18 NOTE — PROGRESS NOTE ADULT - SUBJECTIVE AND OBJECTIVE BOX
=================================  NEUROCRITICAL CARE ATTENDING NOTE  =================================    MARIA FERNANDA VILLARREAL   MRN-[id number]  Summary:  63y/F presented to ER ER with complaint of severe left side head/face pain, intermittent dizziness, also intermittent left CSF otorrhea. She has hx of left cholesteatoma, chronic left mastoiditis  She had an eustachian ventilating tube placed in  (?) which was removed about 1 month ago. She admits that since removal of  tube she is having CSF leakage from left ear and pressure like left side headache.  She states that today she had severe left side headache and left facial like pain and decided to come to ER. She requested to  speak  with Dr. Ramirez who is following her for CSF leak.   CT head is negative for acute pathology; no hemorrhage, no stroke, no hydrocephalus. (16 May 2023 16:15)    COURSE IN THE HOSPITAL:   admitted   POD 0 with EVD insertion    POD 1 dizziness this AM    Past Medical History: Chronic serous otitis media Hypertension Hypercholesteremia Obesity Otorrhea of left ear Trauma CSF otorrhea HLD (hyperlipidemia)  Allergies:  penicillins (Anaphylaxis) dairy products (Angioedema)  Home meds:   ·	losartan 50 mg oral tablet: 1 orally once a day (at bedtime)  ·	rosuvastatin 40 mg oral capsule: 1 orally once a day    PHYSICAL EXAMINATION  T(C): 37.7 ( @ 05:39), Max: 37.7 (-18 @ 05:39) HR: 58 ( @ 08:00) (52 - 70) BP: 100/54 ( @ 07:00) (92/54 - 135/83) RR: 12 ( @ 08:00) (9 - 19) SpO2: 96% ( @ 08:00) (94% - 99%)  NEUROLOGIC EXAMINATION:  Patient is awake, alert, fully oriented, pupils 2-3mm equal and briskly reactive to light, EOMs intact, muscle strength 5/5 on all 4s, no nystagmus, hearing intact  GENERAL: not intubated, not in cardiorespiratory distress  EENT:  anicteric  CARDIOVASCULAR: (+) S1 S2, bradycardic rate and regular rhythm  PULMONARY: clear to auscultation bilaterally  ABDOMEN: soft, nontender with normoactive bowel sounds  EXTREMITIES: no edema  SKIN: no rash    LABS:  CAPILLARY BLOOD GLUCOSE 142 126                         11.5   10.54 )-----------( 191      ( 18 May 2023 05:08 )             34.8     137  |  106  |  6<L>  ----------------------------<  188<H>  3.8   |  24  |  0.45<L>    Ca    7.6<L>      18 May 2023 05:08  Phos  2.6       Mg     1.7         TPro  5.6<L>  /  Alb  3.7  /  TBili  0.5  /  DBili  x   /  AST  14  /  ALT  16  /  AlkPhos  41   @ 07:01  -   @ 07:00  IN: 2580 mL / OUT: 1871 mL / NET: 709 mL    Bacteriology:      Culture - CSF with Gram Stain (collected )  Gram Stain ():    No organisms seen    Rare WBC's    Culture - Tissue with Gram Stain (collected )  Gram Stain ():    No organisms seen    Rare WBC's  Preliminary Report ():    No growth to date    Culture - Tissue with Gram Stain (collected )  Gram Stain ():    No organisms seen    Rare WBC's  Preliminary Report ():    No growth to date      CSF studies:    L   *** [id number] [id number] *** %N   %L       EEG:  Neuroimaging:  Other imaging:    MEDICATIONS:     ·	metroNIDAZOLE  IVPB 500 IV Intermittent every 8 hours  ·	vancomycin  IVPB 1250 IV Intermittent every 12 hours  ·	polyethylene glycol 3350 17 Oral daily  ·	senna 2 Oral at bedtime  ·	atorvastatin 40 Oral at bedtime  ·	insulin lispro (ADMELOG) corrective regimen sliding scale  SubCutaneous Before meals and at bedtime  ·	ofloxacin 0.3% Ophthalmic Solution for OTIC Use 3 Left Ear three times a day  ·	acetaminophen   IVPB .. 1000 IV Intermittent every 6 hours PRN  ·	bisacodyl 5 Oral daily PRN  ·	HYDROmorphone  Injectable 0.5 IV Push every 3 hours PRN  ·	labetalol Injectable 10 IV Push every 2 hours PRN  ·	ondansetron Injectable 4 IV Push every 6 hours PRN  ·	oxyCODONE    IR 5 Oral every 4 hours PRN  ·	oxyCODONE    IR 10 Oral every 4 hours PRN    IV FLUIDS: NS@80cc/hr  DRIPS:  DIET: clears  Lines: Yennifer  Drains:  Loiveira    EVD @10cm Hg ICPs single output 131 /24h  Wounds:    CODE STATUS:  Full Code                       GOALS OF CARE:  aggressive                      DISPOSITION:  ICU

## 2023-05-18 NOTE — CONSULT NOTE ADULT - TIME BILLING
AS cholesteatoma with chronic L otorrhea now s/p CSF leak repair 5/17; no documented evidence of infection intra-op and operative cx ngtd and CSF with 0 nucleated cells. I advised post operative ppx with vancomycin and cefepime and discontinuation of aztreonam and metronidazole on 5/17. Anticipate d/c abx if cx remain ng by 5/19.

## 2023-05-18 NOTE — PROGRESS NOTE ADULT - ASSESSMENT
63y/F with  otorrhea,   Chronic serous otitis media    Hypertension    Hypercholesteremia    Obesity    Otorrhea of left ear    Trauma    CSF otorrhea    HLD (hyperlipidemia)        PLAN:   NEURO: neurochecks q1hm PRN pain meds with acetaminophen   EVD - open to drain monitor ICPs, keep at 5 cm H20  CT haydee today  seizure prophylaxis:  levetiracetam 500mg PO BID, x 7 days   REHAB:  physical therapy evaluation and management    EARLY MOB:   OOB to chair, ambulate    PULM:  Room air, incentive spirometry  CARDIO:  SBP goal 100-140mm Hg  ENDO:  Blood sugar goals 140-180 mg/dL, continue insulin sliding scale, lipitor  GI:  bowel regimen  DIET: ADAT   RENAL:  NS@80, d/c if eating well  HEM/ONC: Hb stable  VTE Prophylaxis:  SCDs, no DVT chemoprophylaxis for now as patient is high risk for bleed (fresh post-op)  ID: afebrile, no leukocytosis, on vancomycin, MNZ, start cefepime, (as per ENT, high risk for central infection) continue ofloxacin otic drops, confirmed with family - anaphylaxis was at age 17 during appendectopmy  Social: daughter updated  MISC: meclizine PRN    Active issues:  What's keeping patient in the ICU? EVD, close neurochecks  What is this patient's dispo plan? stepdown once EVD out    ATTENDING ATTESTATION:  I was physically present for the key portions of the evaluation and management (E/M) service provided.  I agree with the above history, physical and plan, which I have reviewed and edited where appropriate.    Patient at high risk for neurological deterioration or death due to:  ICU delirium, aspiration PNA, DVT / PE.  Critical care time:  I have personally provided 60 minutes of critical care time, excluding time spent on separate procedures.      Plan discussed with RN, house staff.   63y/F with  CSF otorrhea, h/o cholesteatoma, chronic serous otitis media, s/p L EVD placement, L temporal crani for CSF leak repair (05/17/2023, Dr. Ramirez, Dr. Eids [place], Dr. Falcon)  Hypertension dyslipidemia obesity     PLAN:   NEURO: neurochecks q1hm PRN pain meds with acetaminophen   EVD - open to drain monitor ICPs, keep at 5 cm H20  CT haydee today  seizure prophylaxis:  levetiracetam 500mg PO BID, x 7 days   REHAB:  physical therapy evaluation and management    EARLY MOB:   OOB to chair, ambulate    PULM:  Room air, incentive spirometry  CARDIO:  SBP goal 100-140mm Hg  ENDO:  Blood sugar goals 140-180 mg/dL, continue insulin sliding scale, lipitor  GI:  bowel regimen  DIET: ADAT   RENAL:  NS@80, d/c if eating well  HEM/ONC: Hb stable  VTE Prophylaxis:  SCDs, no DVT chemoprophylaxis for now as patient is high risk for bleed (fresh post-op)  ID: afebrile, no leukocytosis, on vancomycin, MNZ, start cefepime, (as per ENT, high risk for central infection) continue ofloxacin otic drops, confirmed with family - anaphylaxis was at age 17 during appendectopmy  Social: daughter updated  MISC: meclizine PRN    Active issues:  What's keeping patient in the ICU? EVD, close neurochecks  What is this patient's dispo plan? stepdown once EVD out    ATTENDING ATTESTATION:  I was physically present for the key portions of the evaluation and management (E/M) service provided.  I agree with the above history, physical and plan, which I have reviewed and edited where appropriate.    Patient at high risk for neurological deterioration or death due to:  ICU delirium, aspiration PNA, DVT / PE.  Critical care time:  I have personally provided 60 minutes of critical care time, excluding time spent on separate procedures.      Plan discussed with RN, house staff.

## 2023-05-18 NOTE — PROGRESS NOTE ADULT - ASSESSMENT
Assessment and Plan:  MARIA FERNANDA VILLARREAL is a 63 years old female with left otorrhea admitted from ED with severe left sided headache and intermittent dizzinesss for Left craniotomy for repair of left otorrhea, intra op EVD placement on 05/17/23.      Plan:  Neuro:  - neuro/vitals q1h  - CT haydee complete 5/16, postop CT haydee pending  - EVD open @5cmH20, monitor outputs  - SG GRECIA x 1 to suction   - Pain control   - f/u csf cultures every 2 days     - ENT continue to follow     [facility] at [phone] with any questions/concerns.    Ashley Mills PA-C  05-18-23 @ 08:57

## 2023-05-18 NOTE — PROGRESS NOTE ADULT - SUBJECTIVE AND OBJECTIVE BOX
NEUROSURGERY POST OP NOTE:    POD# 0 S/P L EVD placement and L temp crani for CSF leak repair w/ ENT    S: Pt examined in NSICU on NC in NAD.       T(C): 36.9 (05-18-23 @ 01:03), Max: 37 (05-17-23 @ 20:15)  HR: 61 (05-18-23 @ 01:00) (52 - 70)  BP: 116/57 (05-18-23 @ 01:00) (110/62 - 137/88)  RR: 14 (05-18-23 @ 01:00) (9 - 17)  SpO2: 94% (05-18-23 @ 01:00) (94% - 99%)      05-17-23 @ 07:01  -  05-18-23 @ 01:52  --------------------------------------------------------  IN: 750 mL / OUT: 984 mL / NET: -234 mL        acetaminophen   IVPB .. 1000 milliGRAM(s) IV Intermittent every 6 hours PRN  atorvastatin 40 milliGRAM(s) Oral at bedtime  aztreonam  IVPB 2000 milliGRAM(s) IV Intermittent every 8 hours  bisacodyl 5 milliGRAM(s) Oral daily PRN  HYDROmorphone  Injectable 0.5 milliGRAM(s) IV Push every 3 hours PRN  insulin lispro (ADMELOG) corrective regimen sliding scale   SubCutaneous Before meals and at bedtime  labetalol Injectable 10 milliGRAM(s) IV Push every 2 hours PRN  metroNIDAZOLE  IVPB 500 milliGRAM(s) IV Intermittent every 8 hours  ofloxacin 0.3% Ophthalmic Solution for OTIC Use 3 Drop(s) Left Ear three times a day  ondansetron Injectable 4 milliGRAM(s) IV Push every 6 hours PRN  oxyCODONE    IR 5 milliGRAM(s) Oral every 4 hours PRN  oxyCODONE    IR 10 milliGRAM(s) Oral every 4 hours PRN  polyethylene glycol 3350 17 Gram(s) Oral daily  potassium chloride    Tablet ER 40 milliEquivalent(s) Oral every 4 hours  senna 2 Tablet(s) Oral at bedtime  sodium chloride 0.9%. 1000 milliLiter(s) IV Continuous <Continuous>  vancomycin  IVPB 1250 milliGRAM(s) IV Intermittent once        Exam:  General: patient seen laying supine in bed in NAD  Neuro: AAOx3, FC, OE spontaneously, speech clear and fluent, face symmetric, no pronator drift, strength 5/5 b/l UE and LE  HEENT: PERRL, EOMI  Pulmonary: chest rise symmetric  Abdomen: soft, nontender, nondistended  Ext: perfusing well  Skin: warm, dry  Wound: L crani incision c/d/i +headwrap in place    Assessment: 63 years old female with left otorrhea admitted from ED with severe left sided headache and intermittent dizzinesss for Left craniotomy for repair of left otorrhea, intra op EVD placement on 05/17/23.      Plan:  Neuro:  - neuro/vitals q1h  - CT haydee complete 5/16, postop CT haydee pending  - EVD open @5cmH20, monitor outputs  - SG GRECIA x 1 to suction   - Pain control     Cardio:  - SBP<140   - losartan 50mg daily held preop  - atorvastin 40mg at bedtime (rosuvastatin 40mg daily home med)     Pulm:   - RA    GI:  - ADAT   - bowel regimen    Endo:   - ISS    Renal:  - Oliveira remove in am   - IVF while NPO     ID:   - afebrile    Heme:  -SCDs for DVT ppx  - SQL held in setting of OR     Dispo: pending OR, full code    Discussed w/ Dr. Ramirez

## 2023-05-18 NOTE — PROGRESS NOTE ADULT - SUBJECTIVE AND OBJECTIVE BOX
HPI:  HPI: 64 y/o female with PMH of HTN, HLD. She presented to ER with complaint of severe left side head/face pain, intermittent dizziness, also intermittent left CSF otorrhea. She has hx of left cholesteatoma, chronic left mastoiditis  She had an eustachian ventilating tube placed in 2020 (?) which was removed about 1 month ago. She admits that since removal of  tube she is having CSF leakage from left ear and pressure like left side headache.  She states that today she had severe left side headache and left facial like pain and decided to come to ER. She requested to  speak  with Dr. Ramirez who is following her for CSF leak.   CT head is negative for acute pathology; no hemorrhage, no stroke, no hydrocephalus. (16 May 2023 16:15)    On admission, the patient was:  GCS: 15  Hunt-Deleon:  modified Ellis:  ICH score:  NIHSS:      ICU Vital Signs Last 24 Hrs  T(C): 37.3 (18 May 2023 18:09), Max: 37.7 (18 May 2023 05:39)  T(F): 99.1 (18 May 2023 18:09), Max: 99.8 (18 May 2023 05:39)  HR: 65 (18 May 2023 19:00) (58 - 70)  BP: 115/59 (18 May 2023 19:00) (92/54 - 122/64)  BP(mean): 83 (18 May 2023 19:00) (67 - 89)  ABP: 128/56 (18 May 2023 19:00) (107/49 - 134/70)  ABP(mean): 78 (18 May 2023 19:00) (67 - 93)  RR: 21 (18 May 2023 19:00) (9 - 21)  SpO2: 98% (18 May 2023 19:00) (94% - 99%)      05-17-23 @ 07:01  -  05-18-23 @ 07:00  --------------------------------------------------------  IN: 2580 mL / OUT: 1871 mL / NET: 709 mL    05-18-23 @ 07:01  -  05-18-23 @ 19:44  --------------------------------------------------------  IN: 885 mL / OUT: 2605 mL / NET: -1720 mL      EXAMINATION:  General: Calm  HEENT:  MMM  Neuro: Awake, alert, oriented x 3  Pupils 3mm and reactive, EOMI  Follows commands, moves all extremities 5/5 strength, sensation intact  Cards: S1/S2, RRR  Respiratory: Clear, no wheeze  Abdomen: Soft, non- tender  Extremities:  No edema  Skin:  Warm/dry      MEDICATIONS:   acetaminophen     Tablet .. 650 milliGRAM(s) Oral every 6 hours PRN  atorvastatin 40 milliGRAM(s) Oral at bedtime  bisacodyl 5 milliGRAM(s) Oral daily PRN  cefepime   IVPB 2000 milliGRAM(s) IV Intermittent every 8 hours  HYDROmorphone  Injectable 0.5 milliGRAM(s) IV Push every 3 hours PRN  insulin lispro (ADMELOG) corrective regimen sliding scale   SubCutaneous Before meals and at bedtime  labetalol Injectable 10 milliGRAM(s) IV Push every 2 hours PRN  levETIRAcetam 500 milliGRAM(s) Oral two times a day  ofloxacin 0.3% Ophthalmic Solution for OTIC Use 3 Drop(s) Left Ear three times a day  ondansetron Injectable 4 milliGRAM(s) IV Push every 6 hours PRN  oxyCODONE    IR 5 milliGRAM(s) Oral every 4 hours PRN  polyethylene glycol 3350 17 Gram(s) Oral daily  senna 2 Tablet(s) Oral at bedtime  traMADol 25 milliGRAM(s) Oral every 6 hours PRN  traMADol 50 milliGRAM(s) Oral every 6 hours PRN  vancomycin  IVPB 1250 milliGRAM(s) IV Intermittent every 12 hours    LABS:                    11.5   10.54 )-----------( 191      ( 18 May 2023 05:08 )             34.8     05-18    137  |  106  |  6<L>  ----------------------------<  188<H>  3.8   |  24  |  0.45<L>    Ca    7.6<L>      18 May 2023 05:08  Phos  2.6     05-18  Mg     1.7     05-18    TPro  5.6<L>  /  Alb  3.7  /  TBili  0.5  /  DBili  x   /  AST  14  /  ALT  16  /  AlkPhos  41  05-17    LIVER FUNCTIONS - ( 17 May 2023 20:54 )  Alb: 3.7 g/dL / Pro: 5.6 g/dL / ALK PHOS: 41 U/L / ALT: 16 U/L / AST: 14 U/L / GGT: x           ASSESSMENT  POD 1 s/p L temp crani for CSF leak repair w/ ENT. L EVD placement.       PLAN:   Q1 neurochecks  CT Terrence   Analgesia prn  EVD open @5cmH20. Monitor ICPs/output/color.   SG GRECIA- monitor output.   PT/OT  Incentive spirometry. Keep sats >92%  SBP goal 100-140  On losartan at home. Resume if needed  Continue lipitor  Replete lytes prn  Bowel regimen [] colace [x] senna [] other:  Goal euglycemia (-180)  VTE prophylaxis: [x] SCDs [] chemoprophylaxis   Abx: Empiric meningitic + anaerobic coverage per ENT/NSGY.   Vancomycin/ cefepime/flagyl until culture data resulted  PCN allergy  ID following  Social: Family updated   HPI:  HPI: 64 y/o female with PMH of HTN, HLD. She presented to ER with complaint of severe left side head/face pain, intermittent dizziness, also intermittent left CSF otorrhea. She has hx of left cholesteatoma, chronic left mastoiditis  She had an eustachian ventilating tube placed in 2020 (?) which was removed about 1 month ago. She admits that since removal of  tube she is having CSF leakage from left ear and pressure like left side headache.  She states that today she had severe left side headache and left facial like pain and decided to come to ER. She requested to  speak  with Dr. Ramirez who is following her for CSF leak.   CT head is negative for acute pathology; no hemorrhage, no stroke, no hydrocephalus. (16 May 2023 16:15)    On admission, the patient was:  GCS: 15  Hunt-Deleon:  modified Ellis:  ICH score:  NIHSS:      ICU Vital Signs Last 24 Hrs  T(C): 37.3 (18 May 2023 18:09), Max: 37.7 (18 May 2023 05:39)  T(F): 99.1 (18 May 2023 18:09), Max: 99.8 (18 May 2023 05:39)  HR: 65 (18 May 2023 19:00) (58 - 70)  BP: 115/59 (18 May 2023 19:00) (92/54 - 122/64)  BP(mean): 83 (18 May 2023 19:00) (67 - 89)  ABP: 128/56 (18 May 2023 19:00) (107/49 - 134/70)  ABP(mean): 78 (18 May 2023 19:00) (67 - 93)  RR: 21 (18 May 2023 19:00) (9 - 21)  SpO2: 98% (18 May 2023 19:00) (94% - 99%)      05-17-23 @ 07:01  -  05-18-23 @ 07:00  --------------------------------------------------------  IN: 2580 mL / OUT: 1871 mL / NET: 709 mL    05-18-23 @ 07:01  -  05-18-23 @ 19:44  --------------------------------------------------------  IN: 885 mL / OUT: 2605 mL / NET: -1720 mL      EXAMINATION:  General: Calm  HEENT:  MMM  Neuro: Awake, alert, oriented x 3  Pupils 3mm and reactive, EOMI  Follows commands, moves all extremities 5/5 strength, sensation intact  Cards: S1/S2, RRR  Respiratory: Clear, no wheeze  Abdomen: Soft, non- tender  Extremities:  No edema  Skin:  Warm/dry      MEDICATIONS:   acetaminophen     Tablet .. 650 milliGRAM(s) Oral every 6 hours PRN  atorvastatin 40 milliGRAM(s) Oral at bedtime  bisacodyl 5 milliGRAM(s) Oral daily PRN  cefepime   IVPB 2000 milliGRAM(s) IV Intermittent every 8 hours  HYDROmorphone  Injectable 0.5 milliGRAM(s) IV Push every 3 hours PRN  insulin lispro (ADMELOG) corrective regimen sliding scale   SubCutaneous Before meals and at bedtime  labetalol Injectable 10 milliGRAM(s) IV Push every 2 hours PRN  levETIRAcetam 500 milliGRAM(s) Oral two times a day  ofloxacin 0.3% Ophthalmic Solution for OTIC Use 3 Drop(s) Left Ear three times a day  ondansetron Injectable 4 milliGRAM(s) IV Push every 6 hours PRN  oxyCODONE    IR 5 milliGRAM(s) Oral every 4 hours PRN  polyethylene glycol 3350 17 Gram(s) Oral daily  senna 2 Tablet(s) Oral at bedtime  traMADol 25 milliGRAM(s) Oral every 6 hours PRN  traMADol 50 milliGRAM(s) Oral every 6 hours PRN  vancomycin  IVPB 1250 milliGRAM(s) IV Intermittent every 12 hours    LABS:                    11.5   10.54 )-----------( 191      ( 18 May 2023 05:08 )             34.8     05-18    137  |  106  |  6<L>  ----------------------------<  188<H>  3.8   |  24  |  0.45<L>    Ca    7.6<L>      18 May 2023 05:08  Phos  2.6     05-18  Mg     1.7     05-18    TPro  5.6<L>  /  Alb  3.7  /  TBili  0.5  /  DBili  x   /  AST  14  /  ALT  16  /  AlkPhos  41  05-17    LIVER FUNCTIONS - ( 17 May 2023 20:54 )  Alb: 3.7 g/dL / Pro: 5.6 g/dL / ALK PHOS: 41 U/L / ALT: 16 U/L / AST: 14 U/L / GGT: x           ASSESSMENT  POD 1 s/p L temp crani for CSF leak repair w/ ENT. L EVD placement.       PLAN:   Q1 neurochecks  CT Terrence stable  Analgesia prn  EVD open @5cmH20. Monitor ICPs/output/color.   SG GRECIA- monitor output.   Incentive spirometry. Keep sats >92%  SBP goal 100-140  On losartan at home. Resume if needed. Continue lipitor  Bowel regimen [] colace [x] senna [] other:  Goal euglycemia (-180)  VTE prophylaxis: [x] SCDs [] chemoprophylaxis   Abx: Empiric meningitic + anaerobic coverage per ENT/NSGY. PCN allergy  Vancomycin/ cefepime until culture data resulted  Monitor trough  ID following  Social: Family updated

## 2023-05-18 NOTE — CHART NOTE - NSCHARTNOTEFT_GEN_A_CORE
5/18: POD1, KAMAR overnight.   Tramadol started for incisional pain. OPAL hubbard completed today. Flagyl dc'd, cefepime added.

## 2023-05-18 NOTE — CONSULT NOTE ADULT - ASSESSMENT
***incomplete*** 64 y/o F presents with pmh of HTN, HLD, cholesteatoma, chronic L mastoiditis, and chronic otorrhea presents for Left sided ear pain for a week with drainage. Pt has hx of eustachean ventilating tube placed in 2020 that was removed 1 month ago. Underwent EVD placement and Left temporal craniotomy for CSF leak repair with neurosurgery and ENT 5/17. At time of consult, WBC 10.54, pt afebrile. CSF cultures from OR still pending.     Recommend:   - stop aztreonam and metronidazole  - c/w vanc 1250 IV q12h - get trough before 4th dose  - start cefepime 2g IV q8h   - f/u OR cultures     Team 1 will follow

## 2023-05-18 NOTE — PROGRESS NOTE ADULT - SUBJECTIVE AND OBJECTIVE BOX
OTOLARYNGOLOGY (ENT) PROGRESS NOTE    PATIENT: MARIA FERNANDA VILLARREAL  MRN: [id number]  : 62  [id number]  DATE OF SERVICE:  23  			         ID:MARIA FERNANDA VILLARREAL is a  63yFemale S/P L EVD placement and L temp crani for CSF leak repair w/ ENT    Subjective/ Interval:  No issues overnight, pain minimal, mastoid soft head compression dressing in place, EVD open at 5cc    ALLERGIES:  penicillins (Anaphylaxis)  dairy products (Angioedema)      MEDICATIONS:  Antiinfectives:   metroNIDAZOLE  IVPB 500 milliGRAM(s) IV Intermittent every 8 hours  vancomycin  IVPB 1250 milliGRAM(s) IV Intermittent every 12 hours    IV fluids:  sodium chloride 0.9%. 1000 milliLiter(s) IV Continuous <Continuous>    Hematologic/Anticoagulation:    Pain medications/Neuro:  acetaminophen   IVPB .. 1000 milliGRAM(s) IV Intermittent every 6 hours PRN  HYDROmorphone  Injectable 0.5 milliGRAM(s) IV Push every 3 hours PRN  ondansetron Injectable 4 milliGRAM(s) IV Push every 6 hours PRN  oxyCODONE    IR 5 milliGRAM(s) Oral every 4 hours PRN  oxyCODONE    IR 10 milliGRAM(s) Oral every 4 hours PRN    Endocrine Medications:   atorvastatin 40 milliGRAM(s) Oral at bedtime  insulin lispro (ADMELOG) corrective regimen sliding scale   SubCutaneous Before meals and at bedtime    All other standing medications:   ofloxacin 0.3% Ophthalmic Solution for OTIC Use 3 Drop(s) Left Ear three times a day  polyethylene glycol 3350 17 Gram(s) Oral daily  senna 2 Tablet(s) Oral at bedtime    All other PRN medications:  bisacodyl 5 milliGRAM(s) Oral daily PRN  labetalol Injectable 10 milliGRAM(s) IV Push every 2 hours PRN    Vital Signs Last 24 Hrs  T(C): 37.7 (18 May 2023 05:39), Max: 37.7 (18 May 2023 05:39)  T(F): 99.8 (18 May 2023 05:39), Max: 99.8 (18 May 2023 05:39)  HR: 58 (18 May 2023 08:00) (52 - 70)  BP: 100/54 (18 May 2023 07:00) (92/54 - 135/83)  BP(mean): 73 (18 May 2023 07:00) (67 - 89)  RR: 12 (18 May 2023 08:00) (9 - 19)  SpO2: 96% (18 May 2023 08:00) (94% - 99%)    Parameters below as of 18 May 2023 08:00  Patient On (Oxygen Delivery Method): room air           @ 07:01  -   @ 07:00  --------------------------------------------------------  IN:    IV PiggyBack: 650 mL    Oral Fluid: 900 mL    sodium chloride 0.9%: 150 mL    sodium chloride 0.9%: 880 mL  Total IN: 2580 mL    OUT:    Drain (mL): 30 mL    External Ventricular Device (mL): 131 mL    Indwelling Catheter - Urethral (mL): 1410 mL    Voided (mL): 300 mL  Total OUT: 1871 mL    Total NET: 709 mL       @ 07:01  -   @ 08:57  --------------------------------------------------------  IN:    IV PiggyBack: 25 mL    sodium chloride 0.9%: 80 mL  Total IN: 105 mL    OUT:    External Ventricular Device (mL): 3 mL    Indwelling Catheter - Urethral (mL): 350 mL  Total OUT: 353 mL    Total NET: -248 mL          23 @ 07:01  -  23 @ 07:00  --------------------------------------------------------  IN:  Total IN: 0 mL    OUT:    Drain (mL): 30 mL    External Ventricular Device (mL): 131 mL  Total OUT: 161 mL    Total NET: -161 mL      23 @ 07:01  -  23 @ 08:57  --------------------------------------------------------  IN:  Total IN: 0 mL    OUT:    External Ventricular Device (mL): 3 mL  Total OUT: 3 mL    Total NET: -3 mL    PHYSICAL EXAM:    General: patient seen laying supine in bed in NAD  Neuro: AAOx3,  OE spontaneously, speech clear and fluent,  HEENT: PERRL, EOMI, head wrap in place, mastoid soft   Pulmonary: regular respirations, non labored   Abdomen: soft, nontender, nondistended  Ext: perfusing well  Skin: warm, dry  Wound: L crani incision c/d/i +headwrap in place      LABS                       11.5   10 )-----------( 191      ( 18 May 2023 05:08 )             34.8        137  |  106  |  6<L>  ----------------------------<  188<H>  3.8   |  24  |  0.45<L>    Ca    7.6<L>      18 May 2023 05:08  Phos  2.6       Mg     1.7         TPro  5.6<L>  /  Alb  3.7  /  TBili  0.5  /  DBili  x   /  AST  14  /  ALT  16  /  AlkPhos  41           Coagulation Studies-   PT/INR - ( 17 May 2023 20:54 )   PT: 13.4 sec;   INR: 1.12          PTT - ( 17 May 2023 20:54 )  PTT:30.2 sec    Endocrine Panel-  Calcium, Total Serum: 7.6 mg/dL ( @ 05:08)  Calcium, Total Serum: 7.6 mg/dL ( @ 20:54)                MICROBIOLOGY:  Culture - Tissue with Gram Stain (collected 23 @ 16:17)  Source: .Tissue 2. Left Mastoid  Gram Stain (23 @ 22:27):    No organisms seen    Rare WBC's  Preliminary Report (23 @ 08:36):    No growth to date    Culture - Tissue with Gram Stain (collected 23 @ 16:17)  Source: .Tissue 1. Left Middle Ear Tissue  Gram Stain (23 @ 22:27):    No organisms seen    Rare WBC's  Preliminary Report (23 @ 08:34):    No growth to date      Culture Results:   No growth to date (23 @ 16:17)  Culture Results:   No growth to date (23 @ 16:17)      Culture - CSF with Gram Stain (collected 23 @ 19:30)  Source: .CSF csf or spec  Gram Stain (23 @ 21:40):    No organisms seen    Rare WBC's    Culture - Tissue with Gram Stain (collected 23 @ 16:17)  Source: .Tissue 2. Left Mastoid  Gram Stain (23 @ 22:27):    No organisms seen    Rare WBC's  Preliminary Report (23 @ 08:36):    No growth to date    Culture - Tissue with Gram Stain (collected 23 @ 16:17)  Source: .Tissue 1. Left Middle Ear Tissue  Gram Stain (23 @ 22:27):    No organisms seen    Rare WBC's  Preliminary Report (23 @ 08:34):    No growth to date

## 2023-05-19 LAB
ANION GAP SERPL CALC-SCNC: 8 MMOL/L — SIGNIFICANT CHANGE UP (ref 5–17)
BUN SERPL-MCNC: 5 MG/DL — LOW (ref 7–23)
CALCIUM SERPL-MCNC: 7.8 MG/DL — LOW (ref 8.4–10.5)
CHLORIDE SERPL-SCNC: 109 MMOL/L — HIGH (ref 96–108)
CK MB CFR SERPL CALC: 3 NG/ML — SIGNIFICANT CHANGE UP (ref 0–6.7)
CK SERPL-CCNC: 400 U/L — HIGH (ref 25–170)
CO2 SERPL-SCNC: 26 MMOL/L — SIGNIFICANT CHANGE UP (ref 22–31)
CREAT SERPL-MCNC: 0.49 MG/DL — LOW (ref 0.5–1.3)
EGFR: 106 ML/MIN/1.73M2 — SIGNIFICANT CHANGE UP
GLUCOSE BLDC GLUCOMTR-MCNC: 96 MG/DL — SIGNIFICANT CHANGE UP (ref 70–99)
GLUCOSE SERPL-MCNC: 97 MG/DL — SIGNIFICANT CHANGE UP (ref 70–99)
HCT VFR BLD CALC: 34 % — LOW (ref 34.5–45)
HGB BLD-MCNC: 11.1 G/DL — LOW (ref 11.5–15.5)
MAGNESIUM SERPL-MCNC: 2.3 MG/DL — SIGNIFICANT CHANGE UP (ref 1.6–2.6)
MCHC RBC-ENTMCNC: 29.1 PG — SIGNIFICANT CHANGE UP (ref 27–34)
MCHC RBC-ENTMCNC: 32.6 GM/DL — SIGNIFICANT CHANGE UP (ref 32–36)
MCV RBC AUTO: 89 FL — SIGNIFICANT CHANGE UP (ref 80–100)
NRBC # BLD: 0 /100 WBCS — SIGNIFICANT CHANGE UP (ref 0–0)
PHOSPHATE SERPL-MCNC: 2.7 MG/DL — SIGNIFICANT CHANGE UP (ref 2.5–4.5)
PLATELET # BLD AUTO: 183 K/UL — SIGNIFICANT CHANGE UP (ref 150–400)
POTASSIUM SERPL-MCNC: 3.6 MMOL/L — SIGNIFICANT CHANGE UP (ref 3.5–5.3)
POTASSIUM SERPL-SCNC: 3.6 MMOL/L — SIGNIFICANT CHANGE UP (ref 3.5–5.3)
RBC # BLD: 3.82 M/UL — SIGNIFICANT CHANGE UP (ref 3.8–5.2)
RBC # FLD: 13.7 % — SIGNIFICANT CHANGE UP (ref 10.3–14.5)
SODIUM SERPL-SCNC: 143 MMOL/L — SIGNIFICANT CHANGE UP (ref 135–145)
TROPONIN T SERPL-MCNC: <0.01 NG/ML — SIGNIFICANT CHANGE UP (ref 0–0.01)
VANCOMYCIN TROUGH SERPL-MCNC: 9.1 UG/ML — LOW (ref 10–20)
WBC # BLD: 9.44 K/UL — SIGNIFICANT CHANGE UP (ref 3.8–10.5)
WBC # FLD AUTO: 9.44 K/UL — SIGNIFICANT CHANGE UP (ref 3.8–10.5)

## 2023-05-19 PROCEDURE — 99291 CRITICAL CARE FIRST HOUR: CPT

## 2023-05-19 PROCEDURE — 36000 PLACE NEEDLE IN VEIN: CPT

## 2023-05-19 PROCEDURE — 99232 SBSQ HOSP IP/OBS MODERATE 35: CPT

## 2023-05-19 RX ORDER — TRAMADOL HYDROCHLORIDE 50 MG/1
25 TABLET ORAL EVERY 4 HOURS
Refills: 0 | Status: DISCONTINUED | OUTPATIENT
Start: 2023-05-19 | End: 2023-05-24

## 2023-05-19 RX ORDER — TRAMADOL HYDROCHLORIDE 50 MG/1
50 TABLET ORAL EVERY 4 HOURS
Refills: 0 | Status: DISCONTINUED | OUTPATIENT
Start: 2023-05-19 | End: 2023-05-24

## 2023-05-19 RX ORDER — POLYETHYLENE GLYCOL 3350 17 G/17G
17 POWDER, FOR SOLUTION ORAL
Refills: 0 | Status: DISCONTINUED | OUTPATIENT
Start: 2023-05-19 | End: 2023-05-24

## 2023-05-19 RX ORDER — ONDANSETRON 8 MG/1
4 TABLET, FILM COATED ORAL EVERY 6 HOURS
Refills: 0 | Status: COMPLETED | OUTPATIENT
Start: 2023-05-19 | End: 2023-05-20

## 2023-05-19 RX ORDER — SODIUM CHLORIDE 9 MG/ML
500 INJECTION INTRAMUSCULAR; INTRAVENOUS; SUBCUTANEOUS ONCE
Refills: 0 | Status: COMPLETED | OUTPATIENT
Start: 2023-05-19 | End: 2023-05-19

## 2023-05-19 RX ORDER — POTASSIUM CHLORIDE 20 MEQ
40 PACKET (EA) ORAL ONCE
Refills: 0 | Status: COMPLETED | OUTPATIENT
Start: 2023-05-19 | End: 2023-05-19

## 2023-05-19 RX ORDER — METOCLOPRAMIDE HCL 10 MG
10 TABLET ORAL EVERY 8 HOURS
Refills: 0 | Status: DISCONTINUED | OUTPATIENT
Start: 2023-05-19 | End: 2023-05-24

## 2023-05-19 RX ORDER — POTASSIUM PHOSPHATE, MONOBASIC POTASSIUM PHOSPHATE, DIBASIC 236; 224 MG/ML; MG/ML
15 INJECTION, SOLUTION INTRAVENOUS ONCE
Refills: 0 | Status: COMPLETED | OUTPATIENT
Start: 2023-05-19 | End: 2023-05-19

## 2023-05-19 RX ORDER — ENOXAPARIN SODIUM 100 MG/ML
40 INJECTION SUBCUTANEOUS EVERY 24 HOURS
Refills: 0 | Status: DISCONTINUED | OUTPATIENT
Start: 2023-05-19 | End: 2023-05-23

## 2023-05-19 RX ORDER — SODIUM CHLORIDE 9 MG/ML
1000 INJECTION, SOLUTION INTRAVENOUS ONCE
Refills: 0 | Status: COMPLETED | OUTPATIENT
Start: 2023-05-19 | End: 2023-05-19

## 2023-05-19 RX ADMIN — HYDROMORPHONE HYDROCHLORIDE 0.5 MILLIGRAM(S): 2 INJECTION INTRAMUSCULAR; INTRAVENOUS; SUBCUTANEOUS at 11:05

## 2023-05-19 RX ADMIN — Medication 650 MILLIGRAM(S): at 06:15

## 2023-05-19 RX ADMIN — TRAMADOL HYDROCHLORIDE 50 MILLIGRAM(S): 50 TABLET ORAL at 03:36

## 2023-05-19 RX ADMIN — POLYETHYLENE GLYCOL 3350 17 GRAM(S): 17 POWDER, FOR SOLUTION ORAL at 11:34

## 2023-05-19 RX ADMIN — OFLOXACIN OTIC SOLUTION 3 DROP(S): 3 SOLUTION/ DROPS AURICULAR (OTIC) at 06:15

## 2023-05-19 RX ADMIN — HYDROMORPHONE HYDROCHLORIDE 0.5 MILLIGRAM(S): 2 INJECTION INTRAMUSCULAR; INTRAVENOUS; SUBCUTANEOUS at 10:37

## 2023-05-19 RX ADMIN — ONDANSETRON 4 MILLIGRAM(S): 8 TABLET, FILM COATED ORAL at 05:39

## 2023-05-19 RX ADMIN — Medication 650 MILLIGRAM(S): at 00:00

## 2023-05-19 RX ADMIN — LEVETIRACETAM 500 MILLIGRAM(S): 250 TABLET, FILM COATED ORAL at 06:15

## 2023-05-19 RX ADMIN — OFLOXACIN OTIC SOLUTION 3 DROP(S): 3 SOLUTION/ DROPS AURICULAR (OTIC) at 14:51

## 2023-05-19 RX ADMIN — Medication 10 MILLIGRAM(S): at 11:32

## 2023-05-19 RX ADMIN — TRAMADOL HYDROCHLORIDE 50 MILLIGRAM(S): 50 TABLET ORAL at 21:10

## 2023-05-19 RX ADMIN — Medication 650 MILLIGRAM(S): at 07:00

## 2023-05-19 RX ADMIN — SODIUM CHLORIDE 1000 MILLILITER(S): 9 INJECTION INTRAMUSCULAR; INTRAVENOUS; SUBCUTANEOUS at 22:17

## 2023-05-19 RX ADMIN — Medication 650 MILLIGRAM(S): at 22:16

## 2023-05-19 RX ADMIN — TRAMADOL HYDROCHLORIDE 50 MILLIGRAM(S): 50 TABLET ORAL at 17:00

## 2023-05-19 RX ADMIN — TRAMADOL HYDROCHLORIDE 50 MILLIGRAM(S): 50 TABLET ORAL at 04:00

## 2023-05-19 RX ADMIN — CEFEPIME 100 MILLIGRAM(S): 1 INJECTION, POWDER, FOR SOLUTION INTRAMUSCULAR; INTRAVENOUS at 06:14

## 2023-05-19 RX ADMIN — POTASSIUM PHOSPHATE, MONOBASIC POTASSIUM PHOSPHATE, DIBASIC 62.5 MILLIMOLE(S): 236; 224 INJECTION, SOLUTION INTRAVENOUS at 11:37

## 2023-05-19 RX ADMIN — ENOXAPARIN SODIUM 40 MILLIGRAM(S): 100 INJECTION SUBCUTANEOUS at 22:16

## 2023-05-19 RX ADMIN — Medication 166.67 MILLIGRAM(S): at 02:05

## 2023-05-19 RX ADMIN — TRAMADOL HYDROCHLORIDE 50 MILLIGRAM(S): 50 TABLET ORAL at 20:33

## 2023-05-19 RX ADMIN — Medication 650 MILLIGRAM(S): at 22:50

## 2023-05-19 RX ADMIN — Medication 40 MILLIEQUIVALENT(S): at 07:05

## 2023-05-19 RX ADMIN — SODIUM CHLORIDE 1000 MILLILITER(S): 9 INJECTION, SOLUTION INTRAVENOUS at 10:20

## 2023-05-19 RX ADMIN — ONDANSETRON 4 MILLIGRAM(S): 8 TABLET, FILM COATED ORAL at 17:58

## 2023-05-19 RX ADMIN — TRAMADOL HYDROCHLORIDE 50 MILLIGRAM(S): 50 TABLET ORAL at 16:20

## 2023-05-19 RX ADMIN — ONDANSETRON 4 MILLIGRAM(S): 8 TABLET, FILM COATED ORAL at 09:49

## 2023-05-19 RX ADMIN — LEVETIRACETAM 500 MILLIGRAM(S): 250 TABLET, FILM COATED ORAL at 17:59

## 2023-05-19 NOTE — DIETITIAN INITIAL EVALUATION ADULT - PERTINENT MEDS FT
MEDICATIONS  (STANDING):  atorvastatin 40 milliGRAM(s) Oral at bedtime  levETIRAcetam 500 milliGRAM(s) Oral two times a day  ofloxacin 0.3% Ophthalmic Solution for OTIC Use 3 Drop(s) Left Ear three times a day  ondansetron Injectable 4 milliGRAM(s) IV Push every 6 hours  polyethylene glycol 3350 17 Gram(s) Oral daily  senna 2 Tablet(s) Oral at bedtime    MEDICATIONS  (PRN):  acetaminophen     Tablet .. 650 milliGRAM(s) Oral every 6 hours PRN Temp greater or equal to 38C (100.4F), Mild Pain (1 - 3)  bisacodyl 5 milliGRAM(s) Oral daily PRN Constipation  HYDROmorphone  Injectable 0.5 milliGRAM(s) IV Push every 3 hours PRN breakthrough pain  labetalol Injectable 10 milliGRAM(s) IV Push every 2 hours PRN Systolic blood pressure > 140 mmHg  metoclopramide Injectable 10 milliGRAM(s) IV Push every 8 hours PRN nausea/vomiting 2nd line  traMADol 25 milliGRAM(s) Oral every 4 hours PRN Moderate Pain (4 - 6)  traMADol 50 milliGRAM(s) Oral every 4 hours PRN Severe Pain (7 - 10)

## 2023-05-19 NOTE — DIETITIAN INITIAL EVALUATION ADULT - OTHER INFO
63 years old female with left otorrhea admitted from ED with severe left sided headache and intermittent dizzinesss for Left craniotomy for repair of left otorrhea, intra op EVD placement on 05/17/23.    Visited pt at bedside this am. Daughter in the room. On assessment pt is lethargic. Reports really good appetite at baseline; NKFA - of note pt does not have an allergy to lactose. With poor appetite s/p OR and decreased chewing ability. Eats a couple of berries during assessment. Will recommend changing diet text to soft, bite-sized to possibly promote better PO tolerance/intake. Reports intentional wt loss of 18lbs pta, working with an outpatient RD. No overt fat/muscle wasting noted. Endorses nausea; antiemetic ordered. No PUs noted; 2+ edema to RL arm; skin is bruised with surgical incisions; salvador scale 19. Abd non-distended; unknown LBM; bowel regimen ordered. Nutrition related labs are unremarkable at this time. In pain during assessment. Made pt aware RD remains available. View full recs below. Clinical nutrition services will continue to follow up pt per organizational policy. Please place new consult for any acute nutrition-related issues that may arise prior to follow up

## 2023-05-19 NOTE — PROGRESS NOTE ADULT - ASSESSMENT
63y/F with  CSF otorrhea, h/o cholesteatoma, chronic serous otitis media, s/p L EVD placement, L temporal crani for CSF leak repair (05/17/2023, Dr. Ramirez, Dr. Edis Anthony, Dr. Falcon)  Hypertension dyslipidemia obesity     PLAN:   NEURO: neurochecks q1h PRN pain meds with acetaminophen   EVD - open to drain monitor ICPs, keep at 5 cm H20  VPS next week (Wed?)  seizure prophylaxis:  levetiracetam 500mg PO BID, x 7 days   REHAB:  physical therapy evaluation and management    EARLY MOB:   OOB to chair, ambulate    PULM:  Room air, incentive spirometry  CARDIO:  SBP goal 100-140mm Hg  ENDO:  Blood sugar goals 140-180 mg/dL, d/c insulin sliding scale, lipitor  GI:  bowel regimen  DIET: regular diet   RENAL:  LR 1L bolus, Na goal 135-145  HEM/ONC: Hb stable  VTE Prophylaxis:  SCDs, SQL tonight   ID: afebrile, no leukocytosis, on vancomycin, cefepime, continue ofloxacin otic drops, f/u cultures  Social: daughter updated  MISC: zofran standing, EKG, tramadol to q4h PRN    Active issues:  What's keeping patient in the ICU? EVD, close neurochecks  What is this patient's dispo plan? stepdown once EVD out    ATTENDING ATTESTATION:  I was physically present for the key portions of the evaluation and management (E/M) service provided.  I agree with the above history, physical and plan, which I have reviewed and edited where appropriate.    Patient at high risk for neurological deterioration or death due to:  ICU delirium, aspiration PNA, DVT / PE.  Critical care time:  I have personally provided 60 minutes of critical care time, excluding time spent on separate procedures.      Plan discussed with RN, house staff.

## 2023-05-19 NOTE — PROGRESS NOTE ADULT - ASSESSMENT
62 y/o F presents with pmh of HTN, HLD, cholesteatoma, chronic L mastoiditis, and chronic otorrhea presents for Left sided ear pain for a week with drainage. Pt has hx of eustachean ventilating tube placed in 2020 that was removed 1 month ago. Underwent EVD placement and Left temporal craniotomy for CSF leak repair with neurosurgery and ENT 5/17. At time of consult, WBC 10.54, pt afebrile. CSF cultures from OR showing NGTD.       ***incomplete*** 64 y/o F presents with pmh of HTN, HLD, cholesteatoma, chronic L mastoiditis, and chronic otorrhea presents for Left sided ear pain for a week with drainage. Pt has hx of eustachean ventilating tube placed in 2020 that was removed 1 month ago. Underwent EVD placement and Left temporal craniotomy for CSF leak repair with neurosurgery and ENT 5/17. At time of consult, WBC 10.54, pt afebrile. CSF cultures from OR showing NGTD. Pt treated with 2 days vancomycin & CTX as post-op prophylaxis     Recommend:   - stop antibiotic prophylaxis    Team 1 will sign off. Please reconsult with questions.

## 2023-05-19 NOTE — PROGRESS NOTE ADULT - SUBJECTIVE AND OBJECTIVE BOX
INFECTIOUS DISEASES CONSULT FOLLOW-UP NOTE    INTERVAL HPI/OVERNIGHT EVENTS: Pt afebrile overnight. Complaining of nausea and pain this morning.       ROS:   Constitutional, eyes, ENT, cardiovascular, respiratory, gastrointestinal, genitourinary, integumentary, neurological, psychiatric and heme/lymph are otherwise negative other than noted above       ANTIBIOTICS/RELEVANT: vancomycin, cefepime     MEDICATIONS  (STANDING):  atorvastatin 40 milliGRAM(s) Oral at bedtime  cefepime   IVPB 2000 milliGRAM(s) IV Intermittent every 8 hours  insulin lispro (ADMELOG) corrective regimen sliding scale   SubCutaneous Before meals and at bedtime  lactated ringers Bolus 1000 milliLiter(s) IV Bolus once  levETIRAcetam 500 milliGRAM(s) Oral two times a day  ofloxacin 0.3% Ophthalmic Solution for OTIC Use 3 Drop(s) Left Ear three times a day  ondansetron Injectable 4 milliGRAM(s) IV Push every 6 hours  polyethylene glycol 3350 17 Gram(s) Oral daily  potassium phosphate IVPB 15 milliMole(s) IV Intermittent once  senna 2 Tablet(s) Oral at bedtime  vancomycin  IVPB 1250 milliGRAM(s) IV Intermittent every 12 hours    MEDICATIONS  (PRN):  acetaminophen     Tablet .. 650 milliGRAM(s) Oral every 6 hours PRN Temp greater or equal to 38C (100.4F), Mild Pain (1 - 3)  bisacodyl 5 milliGRAM(s) Oral daily PRN Constipation  HYDROmorphone  Injectable 0.5 milliGRAM(s) IV Push every 3 hours PRN breakthrough pain  labetalol Injectable 10 milliGRAM(s) IV Push every 2 hours PRN Systolic blood pressure > 140 mmHg  traMADol 25 milliGRAM(s) Oral every 4 hours PRN Moderate Pain (4 - 6)  traMADol 50 milliGRAM(s) Oral every 4 hours PRN Severe Pain (7 - 10)        Vital Signs Last 24 Hrs  T(C): 36.6 (19 May 2023 05:37), Max: 37.3 (18 May 2023 18:09)  T(F): 97.8 (19 May 2023 05:37), Max: 99.1 (18 May 2023 18:09)  HR: 65 (19 May 2023 09:00) (50 - 73)  BP: 121/57 (19 May 2023 09:00) (96/51 - 127/60)  BP(mean): 83 (19 May 2023 09:00) (69 - 86)  RR: 12 (19 May 2023 09:00) (11 - 21)  SpO2: 96% (19 May 2023 09:00) (91% - 98%)    Parameters below as of 19 May 2023 09:00  Patient On (Oxygen Delivery Method): room air        05-18-23 @ 07:01  -  05-19-23 @ 07:00  --------------------------------------------------------  IN: 2135 mL / OUT: 4437 mL / NET: -2302 mL    05-19-23 @ 07:01  -  05-19-23 @ 10:05  --------------------------------------------------------  IN: 0 mL / OUT: 1537 mL / NET: -1537 mL      PHYSICAL EXAM:  Constitutional: alert, NAD  Eyes: the sclera and conjunctiva were normal.   ENT: the ears and nose were normal in appearance.   Neck: the appearance of the neck was normal and the neck was supple.   Pulmonary: no respiratory distress and lungs were clear to auscultation bilaterally.   Heart: heart rate was normal and rhythm regular, normal S1 and S2  Vascular:. there was no peripheral edema  Abdomen: normal bowel sounds, soft, non-tender  Neurological: no focal deficits.   Psychiatric: the affect was normal        LABS:                        11.1   9.44  )-----------( 183      ( 19 May 2023 05:30 )             34.0     05-19    143  |  109<H>  |  5<L>  ----------------------------<  97  3.6   |  26  |  0.49<L>    Ca    7.8<L>      19 May 2023 05:30  Phos  2.7     05-19  Mg     2.3     05-19    TPro  5.6<L>  /  Alb  3.7  /  TBili  0.5  /  DBili  x   /  AST  14  /  ALT  16  /  AlkPhos  41  05-17    PT/INR - ( 17 May 2023 20:54 )   PT: 13.4 sec;   INR: 1.12          PTT - ( 17 May 2023 20:54 )  PTT:30.2 sec      MICROBIOLOGY:  Culture - CSF with Gram Stain (05.17.23 @ 19:30)    Gram Stain:   No organisms seen  Rare WBC's   Specimen Source: .CSF csf or spec   Culture Results:   No growth to date        RADIOLOGY & ADDITIONAL STUDIES:  Reviewed

## 2023-05-19 NOTE — DIETITIAN INITIAL EVALUATION ADULT - PERTINENT LABORATORY DATA
05-19    143  |  109<H>  |  5<L>  ----------------------------<  97  3.6   |  26  |  0.49<L>    Ca    7.8<L>      19 May 2023 05:30  Phos  2.7     05-19  Mg     2.3     05-19    TPro  5.6<L>  /  Alb  3.7  /  TBili  0.5  /  DBili  x   /  AST  14  /  ALT  16  /  AlkPhos  41  05-17  POCT Blood Glucose.: 96 mg/dL (05-19-23 @ 06:18)  A1C with Estimated Average Glucose Result: 5.3 % (05-18-23 @ 05:08)

## 2023-05-19 NOTE — PROGRESS NOTE ADULT - SUBJECTIVE AND OBJECTIVE BOX
HPI:  HPI: 62 y/o female with PMH of HTN, HLD. She presented to ER with complaint of severe left side head/face pain, intermittent dizziness, also intermittent left CSF otorrhea. She has hx of left cholesteatoma, chronic left mastoiditis  She had an eustachian ventilating tube placed in 2020 (?) which was removed about 1 month ago. She admits that since removal of  tube she is having CSF leakage from left ear and pressure like left side headache.  She states that today she had severe left side headache and left facial like pain and decided to come to ER. She requested to  speak  with Dr. Ramirez who is following her for CSF leak.   CT head is negative for acute pathology; no hemorrhage, no stroke, no hydrocephalus. (16 May 2023 16:15)    On admission, the patient was:  GCS: 15  Hunt-Deleon:  modified Ellis:  ICH score:  NIHSS:      ICU Vital Signs Last 24 Hrs  T(C): 37.2 (19 May 2023 18:00), Max: 37.2 (19 May 2023 18:00)  T(F): 99 (19 May 2023 18:00), Max: 99 (19 May 2023 18:00)  HR: 70 (19 May 2023 19:00) (50 - 81)  BP: 129/63 (19 May 2023 19:00) (106/53 - 142/65)  BP(mean): 89 (19 May 2023 19:00) (63 - 99)  ABP: 148/66 (19 May 2023 19:00) (112/50 - 152/75)  ABP(mean): 95 (19 May 2023 19:00) (71 - 102)  RR: 13 (19 May 2023 19:00) (12 - 26)  SpO2: 95% (19 May 2023 19:00) (90% - 98%)      05-18-23 @ 07:01  -  05-19-23 @ 07:00  --------------------------------------------------------  IN: 2135 mL / OUT: 4437 mL / NET: -2302 mL    05-19-23 @ 07:01 - 05-19-23 @ 19:31  --------------------------------------------------------  IN: 1570 mL / OUT: 3094 mL / NET: -1524 mL      EXAMINATION:  General: Calm  HEENT:  MMM  Neuro: Awake, alert, oriented x 3  Pupils 3mm and reactive, EOMI  Follows commands, moves all extremities 5/5 strength, sensation intact  Cards: S1/S2, RRR  Respiratory: Clear, no wheeze  Abdomen: Soft, non- tender  Extremities:  No edema  Skin:  Warm/dry      MEDICATIONS:   acetaminophen     Tablet .. 650 milliGRAM(s) Oral every 6 hours PRN  atorvastatin 40 milliGRAM(s) Oral at bedtime  bisacodyl 5 milliGRAM(s) Oral daily PRN  HYDROmorphone  Injectable 0.5 milliGRAM(s) IV Push every 3 hours PRN  labetalol Injectable 10 milliGRAM(s) IV Push every 2 hours PRN  levETIRAcetam 500 milliGRAM(s) Oral two times a day  metoclopramide Injectable 10 milliGRAM(s) IV Push every 8 hours PRN  ofloxacin 0.3% Ophthalmic Solution for OTIC Use 3 Drop(s) Left Ear three times a day  ondansetron Injectable 4 milliGRAM(s) IV Push every 6 hours  polyethylene glycol 3350 17 Gram(s) Oral daily  senna 2 Tablet(s) Oral at bedtime  traMADol 25 milliGRAM(s) Oral every 4 hours PRN  traMADol 50 milliGRAM(s) Oral every 4 hours PRN      LABS:                      11.1   9.44  )-----------( 183      ( 19 May 2023 05:30 )             34.0     05-19    143  |  109<H>  |  5<L>  ----------------------------<  97  3.6   |  26  |  0.49<L>    Ca    7.8<L>      19 May 2023 05:30  Phos  2.7     05-19  Mg     2.3     05-19    TPro  5.6<L>  /  Alb  3.7  /  TBili  0.5  /  DBili  x   /  AST  14  /  ALT  16  /  AlkPhos  41  05-17    LIVER FUNCTIONS - ( 17 May 2023 20:54 )  Alb: 3.7 g/dL / Pro: 5.6 g/dL / ALK PHOS: 41 U/L / ALT: 16 U/L / AST: 14 U/L / GGT: x             ASSESSMENT  POD 2 s/p L temp crani for CSF leak repair w/ ENT. L EVD placement.       PLAN:   Q1 neurochecks  CT Serena stable  Analgesia prn  EVD open @5cmH20. Monitor ICPs/output/color. Pending VPS  SG GRECIA- monitor output.   Incentive spirometry. Keep sats >92%  SBP goal 100-140  On losartan at home. Resume if needed. Continue lipitor  Bowel regimen [] colace [x] senna [] other:  Goal euglycemia (-180)  VTE prophylaxis: [x] SCDs [x] chemoprophylaxis   Abx: Dced  Monitor trough  ID following  Social: Family updated    Additional 45 minutes of critical care time     HPI:  HPI: 64 y/o female with PMH of HTN, HLD. She presented to ER with complaint of severe left side head/face pain, intermittent dizziness, also intermittent left CSF otorrhea. She has hx of left cholesteatoma, chronic left mastoiditis  She had an eustachian ventilating tube placed in 2020 (?) which was removed about 1 month ago. She admits that since removal of  tube she is having CSF leakage from left ear and pressure like left side headache.  She states that today she had severe left side headache and left facial like pain and decided to come to ER. She requested to  speak  with Dr. Ramirez who is following her for CSF leak.   CT head is negative for acute pathology; no hemorrhage, no stroke, no hydrocephalus. (16 May 2023 16:15)    On admission, the patient was:  GCS: 15  Hunt-Deleon:  modified Ellis:  ICH score:  NIHSS:      ICU Vital Signs Last 24 Hrs  T(C): 37.2 (19 May 2023 18:00), Max: 37.2 (19 May 2023 18:00)  T(F): 99 (19 May 2023 18:00), Max: 99 (19 May 2023 18:00)  HR: 70 (19 May 2023 19:00) (50 - 81)  BP: 129/63 (19 May 2023 19:00) (106/53 - 142/65)  BP(mean): 89 (19 May 2023 19:00) (63 - 99)  ABP: 148/66 (19 May 2023 19:00) (112/50 - 152/75)  ABP(mean): 95 (19 May 2023 19:00) (71 - 102)  RR: 13 (19 May 2023 19:00) (12 - 26)  SpO2: 95% (19 May 2023 19:00) (90% - 98%)      05-18-23 @ 07:01  -  05-19-23 @ 07:00  --------------------------------------------------------  IN: 2135 mL / OUT: 4437 mL / NET: -2302 mL    05-19-23 @ 07:01 - 05-19-23 @ 19:31  --------------------------------------------------------  IN: 1570 mL / OUT: 3094 mL / NET: -1524 mL      EXAMINATION:  General: Calm  HEENT:  MMM  Neuro: Awake, alert, oriented x 3  Pupils 3mm and reactive, EOMI  Follows commands, moves all extremities 5/5 strength, sensation intact  Cards: S1/S2, RRR  Respiratory: Clear, no wheeze  Abdomen: Soft, non- tender  Extremities:  No edema  Skin:  Warm/dry      MEDICATIONS:   acetaminophen     Tablet .. 650 milliGRAM(s) Oral every 6 hours PRN  atorvastatin 40 milliGRAM(s) Oral at bedtime  bisacodyl 5 milliGRAM(s) Oral daily PRN  HYDROmorphone  Injectable 0.5 milliGRAM(s) IV Push every 3 hours PRN  labetalol Injectable 10 milliGRAM(s) IV Push every 2 hours PRN  levETIRAcetam 500 milliGRAM(s) Oral two times a day  metoclopramide Injectable 10 milliGRAM(s) IV Push every 8 hours PRN  ofloxacin 0.3% Ophthalmic Solution for OTIC Use 3 Drop(s) Left Ear three times a day  ondansetron Injectable 4 milliGRAM(s) IV Push every 6 hours  polyethylene glycol 3350 17 Gram(s) Oral daily  senna 2 Tablet(s) Oral at bedtime  traMADol 25 milliGRAM(s) Oral every 4 hours PRN  traMADol 50 milliGRAM(s) Oral every 4 hours PRN      LABS:                      11.1   9.44  )-----------( 183      ( 19 May 2023 05:30 )             34.0     05-19    143  |  109<H>  |  5<L>  ----------------------------<  97  3.6   |  26  |  0.49<L>    Ca    7.8<L>      19 May 2023 05:30  Phos  2.7     05-19  Mg     2.3     05-19    TPro  5.6<L>  /  Alb  3.7  /  TBili  0.5  /  DBili  x   /  AST  14  /  ALT  16  /  AlkPhos  41  05-17    LIVER FUNCTIONS - ( 17 May 2023 20:54 )  Alb: 3.7 g/dL / Pro: 5.6 g/dL / ALK PHOS: 41 U/L / ALT: 16 U/L / AST: 14 U/L / GGT: x             ASSESSMENT  POD 2 s/p L temp crani for CSF leak repair w/ ENT. L EVD placement.       PLAN:   Q1 neurochecks  CT [place] stable  Analgesia prn  EVD open @5cmH20. Monitor ICPs/output/color. Pending VPS  Incentive spirometry. Keep sats >92%  SBP goal 100-140  On losartan at home. Resume if needed. Continue lipitor  Bowel regimen [] colace [x] senna [] other:  LBM: Awaiting  Goal euglycemia (-180)  VTE prophylaxis: [x] SCDs [x] chemoprophylaxis   Abx: Dced as no longer indicated. ID following    Additional 45 minutes of critical care time

## 2023-05-19 NOTE — DIETITIAN INITIAL EVALUATION ADULT - NS FNS DIET ORDER
Diet, Regular:   Lactose Restricted (Milk Sugar Intoler.)  Milk Protein Restr(Milk Protein Allergy)  Kosher (05-19-23 @ 07:27)

## 2023-05-19 NOTE — PROGRESS NOTE ADULT - SUBJECTIVE AND OBJECTIVE BOX
HPI:  HPI: 63  female comes to ER with complaint of severe left side head/face pain, intermittent dizziness, also intermittent left CSF otorrhea. She has hx of left cholesteatoma, chronic left mastoiditis  She had an eustachian ventilating tube placed in 2020 (?) which was removed about 1 month ago. She admits that since removal of  tube she is having CSF leakage from left ear and pressure like left side headache.  She states that today she had severe left side headache and left facial like pain and decided to come to ER. She requested to  speak  with Dr. Ramirez who is following her for CSF leak.   CT head is negative for acute pathology; no hemorrhage, no stroke, no hydrocephalus. (16 May 2023 16:15)    OVERNIGHT EVENTS: KAMAR overnight, EVD open @5cmH20, SG GRECIA x 1 remains in place.     Hospital Course:   5/16: Admitted for OR tomorrow. CT haydee complete. S/p L EVD placement and L temp crani for CSF leak repair w/ ENT.   5/17: POD0 Left craniotomy for repair of left otorrhea, intra op EVD placement on 05/17/23. Empiric meningitis coverage started, f/u ID recs.   5/18: POD1, KAMAR overnight. Tramadol started for incisional pain. CT haydee completed today. Flagyl dc'd, cefepime added. Oliveira removed, f/u TOV.  5/19: POD2, kamar overnight.     Vital Signs Last 24 Hrs  T(C): 36.8 (18 May 2023 21:49), Max: 37.7 (18 May 2023 05:39)  T(F): 98.2 (18 May 2023 21:49), Max: 99.8 (18 May 2023 05:39)  HR: 59 (19 May 2023 00:00) (58 - 70)  BP: 115/58 (19 May 2023 00:00) (92/54 - 120/58)  BP(mean): 83 (19 May 2023 00:00) (67 - 83)  RR: 13 (19 May 2023 00:00) (11 - 21)  SpO2: 95% (19 May 2023 00:00) (94% - 98%)    Parameters below as of 19 May 2023 00:00  Patient On (Oxygen Delivery Method): room air        I&O's Summary    17 May 2023 07:01  -  18 May 2023 07:00  --------------------------------------------------------  IN: 2580 mL / OUT: 1871 mL / NET: 709 mL    18 May 2023 07:01  -  19 May 2023 00:43  --------------------------------------------------------  IN: 1335 mL / OUT: 3304 mL / NET: -1969 mL        PHYSICAL EXAM:  General: patient seen laying supine in bed in NAD on RA  Neuro: AAOx3, FC, OE spontaneously, speech clear and fluent, face symmetric, no pronator drift,  strength 5/5 b/l UE and LE, sensation grossly intact to light touch throughout  HEENT: PERRL, EOMI  Pulmonary: chest rise symmetric  Abdomen: soft, nontender, nondistended  Ext: perfusing well  Skin: warm, dry  Wound: Crani incision site w/ head wrap in place     TUBES/LINES:  [] Oliveira  [] Lumbar Drain  [X] Wound Drains - SG GRECIA  [X] Others - EVD      DIET:  [] NPO  [x] Mechanical  [] Tube feeds    LABS:                        11.5   10.54 )-----------( 191      ( 18 May 2023 05:08 )             34.8     05-18    137  |  106  |  6<L>  ----------------------------<  188<H>  3.8   |  24  |  0.45<L>    Ca    7.6<L>      18 May 2023 05:08  Phos  2.6     05-18  Mg     1.7     05-18    TPro  5.6<L>  /  Alb  3.7  /  TBili  0.5  /  DBili  x   /  AST  14  /  ALT  16  /  AlkPhos  41  05-17    PT/INR - ( 17 May 2023 20:54 )   PT: 13.4 sec;   INR: 1.12          PTT - ( 17 May 2023 20:54 )  PTT:30.2 sec        CAPILLARY BLOOD GLUCOSE      POCT Blood Glucose.: 93 mg/dL (18 May 2023 21:57)  POCT Blood Glucose.: 112 mg/dL (18 May 2023 15:58)  POCT Blood Glucose.: 112 mg/dL (18 May 2023 10:50)  POCT Blood Glucose.: 142 mg/dL (18 May 2023 06:21)      Drug Levels: [] N/A  Vancomycin Level, Trough: 6.5 ug/mL (05-18 @ 13:23)    CSF Analysis: [] N/A      Allergies    penicillins (Anaphylaxis)  dairy products (Angioedema)    Intolerances      MEDICATIONS:  Antibiotics:  cefepime   IVPB 2000 milliGRAM(s) IV Intermittent every 8 hours  vancomycin  IVPB 1250 milliGRAM(s) IV Intermittent every 12 hours    Neuro:  acetaminophen     Tablet .. 650 milliGRAM(s) Oral every 6 hours PRN  HYDROmorphone  Injectable 0.5 milliGRAM(s) IV Push every 3 hours PRN  levETIRAcetam 500 milliGRAM(s) Oral two times a day  ondansetron Injectable 4 milliGRAM(s) IV Push every 6 hours PRN  oxyCODONE    IR 5 milliGRAM(s) Oral every 4 hours PRN  traMADol 50 milliGRAM(s) Oral every 6 hours PRN  traMADol 25 milliGRAM(s) Oral every 6 hours PRN    Anticoagulation:    OTHER:  atorvastatin 40 milliGRAM(s) Oral at bedtime  bisacodyl 5 milliGRAM(s) Oral daily PRN  insulin lispro (ADMELOG) corrective regimen sliding scale   SubCutaneous Before meals and at bedtime  labetalol Injectable 10 milliGRAM(s) IV Push every 2 hours PRN  ofloxacin 0.3% Ophthalmic Solution for OTIC Use 3 Drop(s) Left Ear three times a day  polyethylene glycol 3350 17 Gram(s) Oral daily  senna 2 Tablet(s) Oral at bedtime    CULTURES:  Culture Results:   No growth to date (05-17 @ 19:30)  Culture Results:   No growth to date (05-17 @ 16:17)    RADIOLOGY & ADDITIONAL TESTS:  < from: CT Head No Cont (05.18.23 @ 15:06) >  Impression:    [place] protocol    Status post left temporal craniotomy/craniectomy and canal wall down   mastoidectomy a tiny extra-axial focus of air minimal fluid and blood   product underlying the craniotomy site. Status post left frontal angel   hole and placement of the EVD. No evidence of hydrocephalus.    --- End of Report ---    < end of copied text >      ASSESSMENT:  63 years old female with left otorrhea admitted from ED with severe left sided headache and intermittent dizzinesss for Left craniotomy for repair of left otorrhea, intra op EVD placement on 05/17/23.    VERTIGO; OTORRHEA, LEFT    Family history of hypertension (Mother)    Handoff    MEWS Score    Chronic serous otitis media    Hypertension    Hypercholesteremia    Obesity    Otorrhea of left ear    Trauma    CSF otorrhea    HLD (hyperlipidemia)    CSF otorrhea    Tegmen defect of base of skull    CSF otorrhea    Tegmen defect of base of skull    Vertigo    Hypertension    Insertion or replacement of external ventricular drain (EVD)    H/O myringotomy    H/O nasal polypectomy    History of appendectomy    HEADACHE    Hypercholesteremia    Otorrhea of left ear    90+    Otorrhea, left    [id number]        PLAN:  Neuro:  - neuro/vitals q1h  - CT haydee complete 5/16, postop CT haydee pending  - EVD open @5cmH20, monitor outputs  - SG GRECIA x 1 to suction   - Pain control     Cardio:  - SBP<140   - losartan 50mg daily held preop  - atorvastin 40mg at bedtime (rosuvastatin 40mg daily home med)     Pulm:   - RA    GI:  - ADAT   - bowel regimen    Endo:   - ISS    Renal:  - voiding  - IVF while NPO     ID:   - afebrile  - ID consulted for empiric meningitis coverage   - vanc/cefepime   - f/u OR cultures    Heme:  -SCDs for DVT ppx  - SQL held in setting of OR     Dispo: pending OR, full code    Discussed w/ Dr. Ramirez      Assessment:  Present when checked    []  GCS  E   V  M     Heart Failure: []Acute, [] acute on chronic , []chronic  Heart Failure:  [] Diastolic (HFpEF), [] Systolic (HFrEF), []Combined (HFpEF and HFrEF), [] RHF, [] Pulm HTN, [] Other    [] MARY, [] ATN, [] AIN, [] other  [] CKD1, [] CKD2, [] CKD 3, [] CKD 4, [] CKD 5, []ESRD    Encephalopathy: [] Metabolic, [] Hepatic, [] toxic, [] Neurological, [] Other    Abnormal Nurtitional Status: [] malnurtition (see nutrition note), [ ]underweight: BMI < 19, [] morbid obesity: BMI >40, [] Cachexia    [] Sepsis  [] hypovolemic shock,[] cardiogenic shock, [] hemorrhagic shock, [] neuogenic shock  [] Acute Respiratory Failure  []Cerebral edema, [] Brain compression/ herniation,   [] Functional quadriplegia  [] Acute blood loss anemia

## 2023-05-19 NOTE — PROGRESS NOTE ADULT - SUBJECTIVE AND OBJECTIVE BOX
OTOLARYNGOLOGY (ENT) PROGRESS NOTE    PATIENT: MARIA FERNANDA VILLARREAL  MRN: [id number]  : 62  [id number]  DATE OF SERVICE:  23  			           ID:MARIA FERNANDA VILLARREAL is a  63yFemale S/P L EVD placement and L temp crani for CSF leak repair w/ ENT    Subjective/ Interval:   No issues overnight, pain minimal, mastoid soft head compression dressing in place, EVD open at 5cc  ; patient seen bedside, dressings in place, EVD clamped until 7     ALLERGIES:  penicillins (Anaphylaxis)  dairy products (Angioedema)      MEDICATIONS:  Antiinfectives:   cefepime   IVPB 2000 milliGRAM(s) IV Intermittent every 8 hours  vancomycin  IVPB 1250 milliGRAM(s) IV Intermittent every 12 hours    IV fluids:  potassium phosphate IVPB 15 milliMole(s) IV Intermittent once    Hematologic/Anticoagulation:    Pain medications/Neuro:  acetaminophen     Tablet .. 650 milliGRAM(s) Oral every 6 hours PRN  HYDROmorphone  Injectable 0.5 milliGRAM(s) IV Push every 3 hours PRN  levETIRAcetam 500 milliGRAM(s) Oral two times a day  ondansetron Injectable 4 milliGRAM(s) IV Push every 6 hours PRN  oxyCODONE    IR 5 milliGRAM(s) Oral every 4 hours PRN  traMADol 50 milliGRAM(s) Oral every 6 hours PRN  traMADol 25 milliGRAM(s) Oral every 6 hours PRN    Endocrine Medications:   atorvastatin 40 milliGRAM(s) Oral at bedtime  insulin lispro (ADMELOG) corrective regimen sliding scale   SubCutaneous Before meals and at bedtime    All other standing medications:   ofloxacin 0.3% Ophthalmic Solution for OTIC Use 3 Drop(s) Left Ear three times a day  polyethylene glycol 3350 17 Gram(s) Oral daily  senna 2 Tablet(s) Oral at bedtime    All other PRN medications:  bisacodyl 5 milliGRAM(s) Oral daily PRN  labetalol Injectable 10 milliGRAM(s) IV Push every 2 hours PRN    Vital Signs Last 24 Hrs  T(C): 36.6 (19 May 2023 05:37), Max: 37.3 (18 May 2023 18:09)  T(F): 97.8 (19 May 2023 05:37), Max: 99.1 (18 May 2023 18:09)  HR: 59 (19 May 2023 07:00) (50 - 69)  BP: 114/57 (19 May 2023 07:00) (96/51 - 127/60)  BP(mean): 79 (19 May 2023 07:00) (69 - 86)  RR: 13 (19 May 2023 07:00) (11 - 21)  SpO2: 95% (19 May 2023 07:00) (91% - 98%)    Parameters below as of 19 May 2023 07:00  Patient On (Oxygen Delivery Method): room air           @ 07:01  -   @ 07:00  --------------------------------------------------------  IN:    IV PiggyBack: 475 mL    Oral Fluid: 1500 mL    sodium chloride 0.9%: 160 mL  Total IN: 2135 mL    OUT:    Drain (mL): 50 mL    External Ventricular Device (mL): 362 mL    Indwelling Catheter - Urethral (mL): 2225 mL    Voided (mL): 1800 mL  Total OUT: 4437 mL    Total NET: -2302 mL          23 @ 07:01  -  23 @ 07:00  --------------------------------------------------------  IN:  Total IN: 0 mL    OUT:    Drain (mL): 50 mL    External Ventricular Device (mL): 362 mL  Total OUT: 412 mL  Total NET: -412 mL        PHYSICAL EXAM:  General: patient seen laying supine in bed in NAD  Neuro: AAOx3,  OE spontaneously, speech clear and fluent,  HEENT: PERRL, EOMI, head wrap in place, mastoid soft   Pulmonary: regular respirations, non labored   Abdomen: soft, nontender, nondistended  Ext: perfusing well  Skin: warm, dry  Wound: L crani incision c/d/i +headwrap in place    LABS                       11.1   9.44  )-----------( 183      ( 19 May 2023 05:30 )             34.0        143  |  109<H>  |  5<L>  ----------------------------<  97  3.6   |  26  |  0.49<L>    Ca    7.8<L>      19 May 2023 05:30  Phos  2.7       Mg     2.3         TPro  5.6<L>  /  Alb  3.7  /  TBili  0.5  /  DBili  x   /  AST  14  /  ALT  16  /  AlkPhos  41           Coagulation Studies-   PT/INR - ( 17 May 2023 20:54 )   PT: 13.4 sec;   INR: 1.12          PTT - ( 17 May 2023 20:54 )  PTT:30.2 sec    Endocrine Panel-  Calcium, Total Serum: 7.8 mg/dL ( @ 05:30)        MICROBIOLOGY:  Culture - Tissue with Gram Stain (collected 23 @ 16:17)  Source: .Tissue 2. Left Mastoid  Gram Stain (23 @ 22:27):    No organisms seen    Rare WBC's  Preliminary Report (23 @ 08:36):    No growth to date    Culture - Tissue with Gram Stain (collected 23 @ 16:17)  Source: .Tissue 1. Left Middle Ear Tissue  Gram Stain (23 @ 22:27):    No organisms seen    Rare WBC's  Preliminary Report (23 @ 08:34):    No growth to date      Culture Results:   No growth to date (23 @ 19:30)  Culture Results:   No growth to date (23 @ 16:17)  Culture Results:   No growth to date (23 @ 16:17)      Culture - CSF with Gram Stain (collected 23 @ 19:30)  Source: .CSF csf or spec  Gram Stain (23 @ 21:40):    No organisms seen    Rare WBC's  Preliminary Report (23 @ 12:33):    No growth to date    Culture - Tissue with Gram Stain (collected 23 @ 16:17)  Source: .Tissue 2. Left Mastoid  Gram Stain (23 @ 22:27):    No organisms seen    Rare WBC's  Preliminary Report (23 @ 08:36):    No growth to date    Culture - Tissue with Gram Stain (collected 23 @ 16:17)  Source: .Tissue 1. Left Middle Ear Tissue  Gram Stain (23 @ 22:27):    No organisms seen    Rare WBC's  Preliminary Report (23 @ 08:34):    No growth to date

## 2023-05-19 NOTE — PROGRESS NOTE ADULT - SUBJECTIVE AND OBJECTIVE BOX
=================================  NEUROCRITICAL CARE ATTENDING NOTE  =================================    MARIA FERNANDA VILLARREAL   [id number]  Summary:  63y/F presented to ER ER with complaint of severe left side head/face pain, intermittent dizziness, also intermittent left CSF otorrhea. She has hx of left cholesteatoma, chronic left mastoiditis  She had an eustachian ventilating tube placed in  (?) which was removed about 1 month ago. She admits that since removal of  tube she is having CSF leakage from left ear and pressure like left side headache.  She states that today she had severe left side headache and left facial like pain and decided to come to ER. She requested to  speak  with Dr. Ramirez who is following her for CSF leak.   CT head is negative for acute pathology; no hemorrhage, no stroke, no hydrocephalus. (16 May 2023 16:15)    COURSE IN THE HOSPITAL:   admitted   POD 0 with EVD insertion    POD 1 dizziness this AM   POD 2 c/o headaches    Past Medical History: Chronic serous otitis media Hypertension Hypercholesteremia Obesity Otorrhea of left ear Trauma CSF otorrhea HLD (hyperlipidemia)  Allergies:  penicillins (Anaphylaxis) dairy products (Angioedema)  Home meds:   ·	losartan 50 mg oral tablet: 1 orally once a day (at bedtime)  ·	rosuvastatin 40 mg oral capsule: 1 orally once a day    PHYSICAL EXAMINATION  T(C): 36.6 ( @ 05:37), Max: 37.3 ( @ 18:09) HR: 65 ( @ 09:00) (50 - 73) BP: 121/57 ( @ 09:00) (96/51 - 127/60) RR: 12 ( @ 09:00) (11 - 21) SpO2: 96% ( @ 09:00) (91% - 98%)  NEUROLOGIC EXAMINATION:  Patient is awake, alert, fully oriented, pupils 2-3mm equal and briskly reactive to light, EOMs intact, muscle strength 5/5 on all 4s, no nystagmus, hearing intact  GENERAL: not intubated, not in cardiorespiratory distress  EENT:  anicteric  CARDIOVASCULAR: (+) S1 S2, bradycardic rate and regular rhythm  PULMONARY: clear to auscultation bilaterally  ABDOMEN: soft, nontender with normoactive bowel sounds  EXTREMITIES: no edema  SKIN: no rash    LABS:   CAPILLARY BLOOD GLUCOSE 96 93 112 112     (10.54)  11.1  (11.5)  9.44  )-----------( 183      ( 19 May 2023 05:30 )             34.0     143  |  109<H>  |  5<L>  ----------------------------<  97  3.6   |  26  |  0.49<L>    Ca    7.8<L>      19 May 2023 05:30  Phos  2.7       Mg     2.3         TPro  5.6<L>  /  Alb  3.7  /  TBili  0.5  /  DBili  x   /  AST  14  /  ALT  16  /  AlkPhos  41   @ 07:01  -   @ 07:00  IN: 2135 mL / OUT: 4437 mL / NET: -2302 mL    Bacteriology:     CSF culture NGTD   Tissue: NGTD x2     CSF studies:    L   *** [id number] WBC0 *** %N   %L       EEG:  Neuroimaging:  Other imaging:    MEDICATIONS:     ·	cefepime   IVPB 2000 IV Intermittent every 8 hours  ·	vancomycin  IVPB 1250 IV Intermittent every 12 hours  ·	levETIRAcetam 500 Oral two times a day  ·	polyethylene glycol 3350 17 Oral daily  ·	senna 2 Oral at bedtime  ·	atorvastatin 40 Oral at bedtime  ·	insulin lispro (ADMELOG) corrective regimen sliding scale  SubCutaneous Before meals and at bedtime  ·	ofloxacin 0.3% Ophthalmic Solution for OTIC Use 3 Left Ear three times a day  ·	acetaminophen     Tablet .. 650 Oral every 6 hours PRN  ·	bisacodyl 5 Oral daily PRN  ·	HYDROmorphone  Injectable 0.5 IV Push every 3 hours PRN  ·	labetalol Injectable 10 IV Push every 2 hours PRN  ·	ondansetron Injectable 4 IV Push every 6 hours PRN  ·	oxyCODONE    IR 5 Oral every 4 hours PRN  ·	traMADol 25 Oral every 6 hours PRN  ·	traMADol 50 Oral every 6 hours PRN    IV FLUIDS: IVL  DRIPS:  DIET: regular diet   Lines: RAUL De Oliveira   Drains:     EVD @10cm Hg ICPs single output 131 /24h   EVD @ 5cm H20 ICPs 8-12 output 362 /24h  Wounds:    CODE STATUS:  Full Code                       GOALS OF CARE:  aggressive                      DISPOSITION:  ICU

## 2023-05-19 NOTE — CHART NOTE - NSCHARTNOTEFT_GEN_A_CORE
Peripheral IV placed. SG GRECIA d/c. Antibiotics discontinued per ID. Neuro exam stable. EVD remains open at 5cmH2O. ICPs WNL. Peripheral IV placed. SG GRECIA d/c. Antibiotics discontinued per ID. Neuro exam stable. EVD remains open at 5cmH2O. ICPs WNL. EKG unremarkable and cardiac enzymes WNL.

## 2023-05-19 NOTE — DIETITIAN INITIAL EVALUATION ADULT - OTHER CALCULATIONS
Ideal body weight used to calculate energy needs due to pt's current body weight >120% ideal body weight. Adjust for s/p OR, age

## 2023-05-19 NOTE — PROGRESS NOTE ADULT - ASSESSMENT
Assessment and Plan:    MARIA FERNANDA VILLARREAL is a 63 years old female with left otorrhea admitted from ED with severe left sided headache and intermittent dizzinesss for Left craniotomy for repair of left otorrhea, intra op EVD placement on 05/17/23.      Plan:  Neuro:  - postop CT haydee pending  - SG GRECIA x 1 to suction   - Pain control   - f/u csf cultures every 2 days   -- head dressing as per NSGY   - ENT continue to follow     [facility] at [phone] with any questions/concerns.    Ashley Mills PA-C  05-19-23 @ 07:44

## 2023-05-19 NOTE — PROGRESS NOTE ADULT - TIME BILLING
Antibiotic prophylaxis recommendations for CSF leak in setting of severe penicillin allergy.    Please reconsult with ?

## 2023-05-19 NOTE — DIETITIAN INITIAL EVALUATION ADULT - ADD RECOMMEND
1. Recommend soft, bite-sized diet order (regular, Kosher)  > Pt does not have allergy to dairy. Please d/c lactose restriction   2. Monitor PO intake closely; honor pt food preferences as able   >> Monitor need/agreeability to ONS regimen (Ensure Enlive 1x/day - 350Kcal, 20g protein)  3. pain/bowel regimen per team   4. Monitor GI fxn, chemistry, skin integrity, wts  5. RD to remain available for recs adjustment prn or will follow up pt per organizational policy     *Team paged

## 2023-05-20 LAB
ANION GAP SERPL CALC-SCNC: 8 MMOL/L — SIGNIFICANT CHANGE UP (ref 5–17)
BUN SERPL-MCNC: 6 MG/DL — LOW (ref 7–23)
CALCIUM SERPL-MCNC: 8.3 MG/DL — LOW (ref 8.4–10.5)
CHLORIDE SERPL-SCNC: 104 MMOL/L — SIGNIFICANT CHANGE UP (ref 96–108)
CO2 SERPL-SCNC: 29 MMOL/L — SIGNIFICANT CHANGE UP (ref 22–31)
CREAT SERPL-MCNC: 0.47 MG/DL — LOW (ref 0.5–1.3)
EGFR: 108 ML/MIN/1.73M2 — SIGNIFICANT CHANGE UP
GLUCOSE SERPL-MCNC: 100 MG/DL — HIGH (ref 70–99)
HCT VFR BLD CALC: 34 % — LOW (ref 34.5–45)
HGB BLD-MCNC: 11 G/DL — LOW (ref 11.5–15.5)
MAGNESIUM SERPL-MCNC: 2.2 MG/DL — SIGNIFICANT CHANGE UP (ref 1.6–2.6)
MCHC RBC-ENTMCNC: 28.7 PG — SIGNIFICANT CHANGE UP (ref 27–34)
MCHC RBC-ENTMCNC: 32.4 GM/DL — SIGNIFICANT CHANGE UP (ref 32–36)
MCV RBC AUTO: 88.8 FL — SIGNIFICANT CHANGE UP (ref 80–100)
NRBC # BLD: 0 /100 WBCS — SIGNIFICANT CHANGE UP (ref 0–0)
PHOSPHATE SERPL-MCNC: 3.4 MG/DL — SIGNIFICANT CHANGE UP (ref 2.5–4.5)
PLATELET # BLD AUTO: 166 K/UL — SIGNIFICANT CHANGE UP (ref 150–400)
POTASSIUM SERPL-MCNC: 3.7 MMOL/L — SIGNIFICANT CHANGE UP (ref 3.5–5.3)
POTASSIUM SERPL-SCNC: 3.7 MMOL/L — SIGNIFICANT CHANGE UP (ref 3.5–5.3)
RBC # BLD: 3.83 M/UL — SIGNIFICANT CHANGE UP (ref 3.8–5.2)
RBC # FLD: 13.4 % — SIGNIFICANT CHANGE UP (ref 10.3–14.5)
SODIUM SERPL-SCNC: 141 MMOL/L — SIGNIFICANT CHANGE UP (ref 135–145)
WBC # BLD: 7.45 K/UL — SIGNIFICANT CHANGE UP (ref 3.8–10.5)
WBC # FLD AUTO: 7.45 K/UL — SIGNIFICANT CHANGE UP (ref 3.8–10.5)

## 2023-05-20 PROCEDURE — 99232 SBSQ HOSP IP/OBS MODERATE 35: CPT

## 2023-05-20 PROCEDURE — 99291 CRITICAL CARE FIRST HOUR: CPT

## 2023-05-20 RX ORDER — POTASSIUM CHLORIDE 20 MEQ
40 PACKET (EA) ORAL ONCE
Refills: 0 | Status: COMPLETED | OUTPATIENT
Start: 2023-05-20 | End: 2023-05-20

## 2023-05-20 RX ORDER — SODIUM CHLORIDE 9 MG/ML
500 INJECTION INTRAMUSCULAR; INTRAVENOUS; SUBCUTANEOUS ONCE
Refills: 0 | Status: COMPLETED | OUTPATIENT
Start: 2023-05-20 | End: 2023-05-20

## 2023-05-20 RX ADMIN — LEVETIRACETAM 500 MILLIGRAM(S): 250 TABLET, FILM COATED ORAL at 19:05

## 2023-05-20 RX ADMIN — HYDROMORPHONE HYDROCHLORIDE 0.5 MILLIGRAM(S): 2 INJECTION INTRAMUSCULAR; INTRAVENOUS; SUBCUTANEOUS at 19:45

## 2023-05-20 RX ADMIN — OFLOXACIN OTIC SOLUTION 3 DROP(S): 3 SOLUTION/ DROPS AURICULAR (OTIC) at 15:06

## 2023-05-20 RX ADMIN — POLYETHYLENE GLYCOL 3350 17 GRAM(S): 17 POWDER, FOR SOLUTION ORAL at 06:02

## 2023-05-20 RX ADMIN — TRAMADOL HYDROCHLORIDE 50 MILLIGRAM(S): 50 TABLET ORAL at 23:30

## 2023-05-20 RX ADMIN — TRAMADOL HYDROCHLORIDE 50 MILLIGRAM(S): 50 TABLET ORAL at 03:32

## 2023-05-20 RX ADMIN — LEVETIRACETAM 500 MILLIGRAM(S): 250 TABLET, FILM COATED ORAL at 06:01

## 2023-05-20 RX ADMIN — SODIUM CHLORIDE 250 MILLILITER(S): 9 INJECTION INTRAMUSCULAR; INTRAVENOUS; SUBCUTANEOUS at 13:05

## 2023-05-20 RX ADMIN — HYDROMORPHONE HYDROCHLORIDE 0.5 MILLIGRAM(S): 2 INJECTION INTRAMUSCULAR; INTRAVENOUS; SUBCUTANEOUS at 14:08

## 2023-05-20 RX ADMIN — HYDROMORPHONE HYDROCHLORIDE 0.5 MILLIGRAM(S): 2 INJECTION INTRAMUSCULAR; INTRAVENOUS; SUBCUTANEOUS at 14:30

## 2023-05-20 RX ADMIN — TRAMADOL HYDROCHLORIDE 50 MILLIGRAM(S): 50 TABLET ORAL at 07:54

## 2023-05-20 RX ADMIN — OFLOXACIN OTIC SOLUTION 3 DROP(S): 3 SOLUTION/ DROPS AURICULAR (OTIC) at 06:04

## 2023-05-20 RX ADMIN — TRAMADOL HYDROCHLORIDE 50 MILLIGRAM(S): 50 TABLET ORAL at 04:02

## 2023-05-20 RX ADMIN — ONDANSETRON 4 MILLIGRAM(S): 8 TABLET, FILM COATED ORAL at 06:03

## 2023-05-20 RX ADMIN — SODIUM CHLORIDE 250 MILLILITER(S): 9 INJECTION INTRAMUSCULAR; INTRAVENOUS; SUBCUTANEOUS at 10:05

## 2023-05-20 RX ADMIN — TRAMADOL HYDROCHLORIDE 50 MILLIGRAM(S): 50 TABLET ORAL at 08:26

## 2023-05-20 RX ADMIN — ENOXAPARIN SODIUM 40 MILLIGRAM(S): 100 INJECTION SUBCUTANEOUS at 22:06

## 2023-05-20 RX ADMIN — Medication 40 MILLIEQUIVALENT(S): at 15:06

## 2023-05-20 RX ADMIN — SENNA PLUS 2 TABLET(S): 8.6 TABLET ORAL at 22:07

## 2023-05-20 RX ADMIN — HYDROMORPHONE HYDROCHLORIDE 0.5 MILLIGRAM(S): 2 INJECTION INTRAMUSCULAR; INTRAVENOUS; SUBCUTANEOUS at 19:06

## 2023-05-20 NOTE — PROGRESS NOTE ADULT - SUBJECTIVE AND OBJECTIVE BOX
=================================  NEUROCRITICAL CARE ATTENDING NOTE  =================================    MARIA FERNANDA VILLARREAL   MRN-[id number]  Summary:  63y/F presented to ER ER with complaint of severe left side head/face pain, intermittent dizziness, also intermittent left CSF otorrhea. She has hx of left cholesteatoma, chronic left mastoiditis  She had an eustachian ventilating tube placed in  (?) which was removed about 1 month ago. She admits that since removal of  tube she is having CSF leakage from left ear and pressure like left side headache.  She states that today she had severe left side headache and left facial like pain and decided to come to ER. She requested to  speak  with Dr. Ramirez who is following her for CSF leak.   CT head is negative for acute pathology; no hemorrhage, no stroke, no hydrocephalus. (16 May 2023 16:15)    COURSE IN THE HOSPITAL:   admitted   POD 0 with EVD insertion    POD 1 dizziness this AM   POD 2 c/o headaches   POD 3 Tmax 38.1    Past Medical History: Chronic serous otitis media Hypertension Hypercholesteremia Obesity Otorrhea of left ear Trauma CSF otorrhea HLD (hyperlipidemia)  Allergies:  penicillins (Anaphylaxis) dairy products (Angioedema)  Home meds:   ·	losartan 50 mg oral tablet: 1 orally once a day (at bedtime)  ·	rosuvastatin 40 mg oral capsule: 1 orally once a day    PHYSICAL EXAMINATION  T(C): 37 ( @ 04:38), Max: 38.1 ( @ 22:10) HR: 52 ( @ 07:00) (50 - 81) BP: 127/58 ( @ 07:00) (113/55 - 142/65) RR: 13 ( @ 07:00) (11 - 26) SpO2: 96% ( @ 07:00) (90% - 100%)  NEUROLOGIC EXAMINATION:  Patient is awake, alert, fully oriented, pupils 2-3mm equal and briskly reactive to light, EOMs intact, muscle strength 5/5 on all 4s, no nystagmus, hearing intact  GENERAL: not intubated, not in cardiorespiratory distress  EENT:  anicteric  CARDIOVASCULAR: (+) S1 S2, bradycardic rate and regular rhythm  PULMONARY: clear to auscultation bilaterally  ABDOMEN: soft, nontender with normoactive bowel sounds  EXTREMITIES: no edema  SKIN: no rash    LABS:     (9.44)   11.0 (11.1)   7.45  )-----------( 166      ( 20 May 2023 05:15 )             34.0     141  |  104  |  6<L>  ----------------------------<  100<H>  3.7   |  29  |  0.47<L>    Ca    8.3<L>      20 May 2023 05:15  Phos  3.4     -  Mg     2.2      @ 07:01  -   @ 07:00  IN: 2220 mL / OUT: 4525 mL / NET: -2305 mL    Bacteriology:   CSF culture NGTD   Tissue: NGTD x2     CSF studies:    L   *** UKS018 WBC0 *** %N   %L       EEG:  Neuroimaging:  Other imaging:    MEDICATIONS:     ·	enoxaparin Injectable 40 SubCutaneous every 24 hours  ·	levETIRAcetam 500 Oral two times a day  ·	polyethylene glycol 3350 17 Oral two times a day  ·	senna 2 Oral at bedtime  ·	ofloxacin 0.3% Ophthalmic Solution for OTIC Use 3 Left Ear three times a day  ·	acetaminophen     Tablet .. 650 Oral every 6 hours PRN  ·	bisacodyl 5 Oral daily PRN  ·	HYDROmorphone  Injectable 0.5 IV Push every 3 hours PRN  ·	labetalol Injectable 10 IV Push every 2 hours PRN  ·	metoclopramide Injectable 10 IV Push every 8 hours PRN  ·	traMADol 25 Oral every 4 hours PRN  ·	traMADol 50 Oral every 4 hours PRN    IV FLUIDS: IVL  DRIPS:  DIET: regular diet   Lines: RAUL De Oliveira   Drains:     EVD @10cm Hg ICPs single output 131 /24h   EVD @ 5cm H20 ICPs 8-12 output 362 /24h  Wounds:    CODE STATUS:  Full Code                       GOALS OF CARE:  aggressive                      DISPOSITION:  ICU =================================  NEUROCRITICAL CARE ATTENDING NOTE  =================================    MARIA FERNANDA VILLARREAL   MRN-[id number]  Summary:  63y/F presented to ER ER with complaint of severe left side head/face pain, intermittent dizziness, also intermittent left CSF otorrhea. She has hx of left cholesteatoma, chronic left mastoiditis  She had an eustachian ventilating tube placed in  (?) which was removed about 1 month ago. She admits that since removal of  tube she is having CSF leakage from left ear and pressure like left side headache.  She states that today she had severe left side headache and left facial like pain and decided to come to ER. She requested to  speak  with Dr. Ramirez who is following her for CSF leak.   CT head is negative for acute pathology; no hemorrhage, no stroke, no hydrocephalus. (16 May 2023 16:15)    COURSE IN THE HOSPITAL:   admitted   POD 0 with EVD insertion    POD 1 dizziness this AM   POD 2 c/o headaches   POD 3 Tmax 38.1, high EVD drain output    Past Medical History: Chronic serous otitis media Hypertension Hypercholesteremia Obesity Otorrhea of left ear Trauma CSF otorrhea HLD (hyperlipidemia)  Allergies:  penicillins (Anaphylaxis) dairy products (Angioedema)  Home meds:   ·	losartan 50 mg oral tablet: 1 orally once a day (at bedtime)  ·	rosuvastatin 40 mg oral capsule: 1 orally once a day    PHYSICAL EXAMINATION  T(C): 37 ( @ 04:38), Max: 38.1 ( @ 22:10) HR: 52 ( @ 07:00) (50 - 81) BP: 127/58 ( @ 07:00) (113/55 - 142/65) RR: 13 ( @ 07:00) (11 - 26) SpO2: 96% ( @ 07:00) (90% - 100%)  NEUROLOGIC EXAMINATION:  Patient is awake, alert, fully oriented, pupils 2-3mm equal and briskly reactive to light, EOMs intact, muscle strength 5/5 on all 4s, no nystagmus, hearing intact  GENERAL: not intubated, not in cardiorespiratory distress  EENT:  anicteric  CARDIOVASCULAR: (+) S1 S2, bradycardic rate and regular rhythm  PULMONARY: clear to auscultation bilaterally  ABDOMEN: soft, nontender with normoactive bowel sounds  EXTREMITIES: no edema  SKIN: no rash    LABS:     (9.44)   11.0 (11.1)   7.45  )-----------( 166      ( 20 May 2023 05:15 )             34.0     141  |  104  |  6<L>  ----------------------------<  100<H>  3.7   |  29  |  0.47<L>    Ca    8.3<L>      20 May 2023 05:15  Phos  3.4     -  Mg     2.2      @ 07:01  -   @ 07:00  IN: 2220 mL / OUT: 4525 mL / NET: -2305 mL    Bacteriology:   CSF culture NGTD   Tissue: NGTD x2     CSF studies:    L   *** [id number] WBC0 *** %N   %L       EEG:  Neuroimaging:  Other imaging:    MEDICATIONS:     ·	enoxaparin Injectable 40 SubCutaneous every 24 hours  ·	levETIRAcetam 500 Oral two times a day  ·	polyethylene glycol 3350 17 Oral two times a day  ·	senna 2 Oral at bedtime  ·	ofloxacin 0.3% Ophthalmic Solution for OTIC Use 3 Left Ear three times a day  ·	acetaminophen     Tablet .. 650 Oral every 6 hours PRN  ·	bisacodyl 5 Oral daily PRN  ·	HYDROmorphone  Injectable 0.5 IV Push every 3 hours PRN  ·	labetalol Injectable 10 IV Push every 2 hours PRN  ·	metoclopramide Injectable 10 IV Push every 8 hours PRN  ·	traMADol 25 Oral every 4 hours PRN  ·	traMADol 50 Oral every 4 hours PRN    IV FLUIDS: IVL  DRIPS:  DIET: regular diet   Lines: RAUL De Oliveira   Drains:     EVD @10cm Hg ICPs single output 131 /24h   EVD @ 5cm H20 ICPs 8-12 output 362 /24h   EVD @ 5cm H20 ICPs <10 output 325/24h  Wounds:    CODE STATUS:  Full Code                       GOALS OF CARE:  aggressive                      DISPOSITION:  ICU

## 2023-05-20 NOTE — PROGRESS NOTE ADULT - SUBJECTIVE AND OBJECTIVE BOX
HPI:  HPI: 62 y/o female with PMH of HTN, HLD. She presented to ER with complaint of severe left side head/face pain, intermittent dizziness, also intermittent left CSF otorrhea. She has hx of left cholesteatoma, chronic left mastoiditis  She had an eustachian ventilating tube placed in 2020 (?) which was removed about 1 month ago. She admits that since removal of  tube she is having CSF leakage from left ear and pressure like left side headache.  She states that today she had severe left side headache and left facial like pain and decided to come to ER. She requested to  speak  with Dr. Ramirez who is following her for CSF leak.   CT head is negative for acute pathology; no hemorrhage, no stroke, no hydrocephalus. (16 May 2023 16:15)    On admission, the patient was:  GCS: 15  Hunt-Deleon:  modified Ellis:  ICH score:  NIHSS:      ICU Vital Signs Last 24 Hrs  T(C): 37.4 (20 May 2023 21:25), Max: 37.4 (20 May 2023 21:25)  T(F): 99.3 (20 May 2023 21:25), Max: 99.3 (20 May 2023 21:25)  HR: 63 (20 May 2023 22:00) (49 - 74)  BP: 119/58 (20 May 2023 19:00) (112/60 - 140/62)  BP(mean): 83 (20 May 2023 19:00) (79 - 96)  ABP: 112/49 (20 May 2023 22:00) (109/51 - 150/69)  ABP(mean): 69 (20 May 2023 22:00) (69 - 99)  RR: 13 (20 May 2023 22:00) (10 - 22)  SpO2: 93% (20 May 2023 22:00) (92% - 100%)      05-19-23 @ 07:01  -  05-20-23 @ 07:00  --------------------------------------------------------  IN: 2220 mL / OUT: 4525 mL / NET: -2305 mL    05-20-23 @ 07:01  -  05-20-23 @ 22:20  --------------------------------------------------------  IN: 1460 mL / OUT: 2523 mL / NET: -1063 mL      EXAMINATION:  General: Calm  HEENT:  MMM  Neuro: Awake, alert, oriented x 3  Pupils 3mm and reactive, EOMI  Follows commands, moves all extremities 5/5 strength, sensation intact  Cards: S1/S2, RRR  Respiratory: Clear, no wheeze  Abdomen: Soft, non- tender  Extremities:  No edema  Skin:  Warm/dry      MEDICATIONS:   acetaminophen     Tablet .. 650 milliGRAM(s) Oral every 6 hours PRN  bisacodyl 5 milliGRAM(s) Oral daily PRN  enoxaparin Injectable 40 milliGRAM(s) SubCutaneous every 24 hours  HYDROmorphone  Injectable 0.5 milliGRAM(s) IV Push every 3 hours PRN  labetalol Injectable 10 milliGRAM(s) IV Push every 2 hours PRN  levETIRAcetam 500 milliGRAM(s) Oral two times a day  metoclopramide Injectable 10 milliGRAM(s) IV Push every 8 hours PRN  polyethylene glycol 3350 17 Gram(s) Oral two times a day  senna 2 Tablet(s) Oral at bedtime  traMADol 50 milliGRAM(s) Oral every 4 hours PRN  traMADol 25 milliGRAM(s) Oral every 4 hours PRN      LABS:                      11.0   7.45  )-----------( 166      ( 20 May 2023 05:15 )             34.0     05-20    141  |  104  |  6<L>  ----------------------------<  100<H>  3.7   |  29  |  0.47<L>    Ca    8.3<L>      20 May 2023 05:15  Phos  3.4     05-20  Mg     2.2     05-20      ASSESSMENT  POD 3 s/p L temp crani for CSF leak repair w/ ENT. L EVD placement.       PLAN:   Q1 neurochecks  Analgesia prn  EVD open @5cmH20. Monitor ICPs/output/color. Pending VPS. Will raise EVD if increased output.   Incentive spirometry. Keep sats >92%  SBP goal 100-140  On losartan at home. Resume if needed. DC atorvastatin re: myalgia.  Bowel regimen [] colace [x] senna [] other:  LBM: Awaiting  Goal euglycemia (-180)  VTE prophylaxis: [x] SCDs [x] chemoprophylaxis   Afebrile. Monitor for signs and symptoms of infection    Additional 45 minutes of critical care time

## 2023-05-20 NOTE — PHYSICAL THERAPY INITIAL EVALUATION ADULT - ADDITIONAL COMMENTS
Pt reports living in house with , with ramp and elevator access. Reports being independent with daily activities without use of DME.

## 2023-05-20 NOTE — PROGRESS NOTE ADULT - ASSESSMENT
Assessment and Plan:    MARIA FERNANDA VILLARREAL is a 63 years old female with left otorrhea admitted from ED with severe left sided headache and intermittent dizzinesss for Left craniotomy for repair of left otorrhea, intra op EVD placement on 05/17/23.      Plan:  Neuro:  - Pain control   - f/u csf cultures every 2 days   - head dressing as per NSGY   - ENT continue to follow     [facility] at [phone] with any questions/concerns.

## 2023-05-20 NOTE — PROGRESS NOTE ADULT - ASSESSMENT
63y/F with  CSF otorrhea, h/o cholesteatoma, chronic serous otitis media, s/p L EVD placement, L temporal crani for CSF leak repair (05/17/2023, Dr. Ramirez, Dr. Edis Anthony, Dr. Falcon)  Hypertension dyslipidemia obesity     PLAN:   NEURO: neurochecks q1h PRN pain meds with acetaminophen   EVD - open to drain monitor ICPs, keep at 5 cm H20  VPS next week (Wed?)  seizure prophylaxis:  levetiracetam 500mg PO BID, x 7 days   REHAB:  physical therapy evaluation and management    EARLY MOB:   OOB to chair, ambulate    PULM:  Room air, incentive spirometry  CARDIO:  SBP goal 100-140mm Hg  ENDO:  Blood sugar goals 140-180 mg/dL, d/c insulin sliding scale, lipitor  GI:  bowel regimen  DIET: regular diet   RENAL:  LR 1L bolus, Na goal 135-145  HEM/ONC: Hb stable  VTE Prophylaxis:  SCDs, SQL tonight   ID: afebrile, no leukocytosis, on vancomycin, cefepime, continue ofloxacin otic drops, f/u cultures  Social: daughter updated  MISC: zofran standing, EKG, tramadol to q4h PRN    Active issues:  What's keeping patient in the ICU? EVD, close neurochecks  What is this patient's dispo plan? stepdown once EVD out    ATTENDING ATTESTATION:  I was physically present for the key portions of the evaluation and management (E/M) service provided.  I agree with the above history, physical and plan, which I have reviewed and edited where appropriate.    Patient at high risk for neurological deterioration or death due to:  ICU delirium, aspiration PNA, DVT / PE.  Critical care time:  I have personally provided 60 minutes of critical care time, excluding time spent on separate procedures.      Plan discussed with RN, house staff.   63y/F with  CSF otorrhea, h/o cholesteatoma, chronic serous otitis media, s/p L EVD placement, L temporal crani for CSF leak repair (05/17/2023, Dr. Ramirez, Dr. Edis Anthony, Dr. Falcon)  Hypertension dyslipidemia obesity     PLAN:   NEURO: neurochecks q1h PRN pain meds with acetaminophen   EVD  increase to 10cm H20 if ok with NSG, - open to drain monitor ICPs  VPS next week (Wed?)  seizure prophylaxis:  levetiracetam 500mg PO BID, x 7 days   REHAB:  physical therapy evaluation and management    EARLY MOB:   OOB to chair, ambulate    PULM:  Room air, incentive spirometry  CARDIO:  SBP goal 100-140mm Hg  ENDO:  Blood sugar goals 140-180 mg/dL, lipitor  GI:  bowel regimen  DIET: regular diet   RENAL:  Na goal 135-145  HEM/ONC: Hb stable  VTE Prophylaxis:  SCDs, SQL tonight   ID: afebrile, no leukocytosis, continue ofloxacin otic drops, off ABx per ID  Social: daughter updated    Active issues:  What's keeping patient in the ICU? EVD, close neurochecks  What is this patient's dispo plan? stepdown once EVD out    ATTENDING ATTESTATION:  I was physically present for the key portions of the evaluation and management (E/M) service provided.  I agree with the above history, physical and plan, which I have reviewed and edited where appropriate.    Patient at high risk for neurological deterioration or death due to:  ICU delirium, aspiration PNA, DVT / PE.  Critical care time:  I have personally provided 60 minutes of critical care time, excluding time spent on separate procedures.      Plan discussed with RN, house staff.

## 2023-05-20 NOTE — OCCUPATIONAL THERAPY INITIAL EVALUATION ADULT - ADDITIONAL COMMENTS
Prior to admission, pt performing all ADLs/mobility independently and w/o use of AD/AE. Pt is right hand dominant.

## 2023-05-20 NOTE — PHYSICAL THERAPY INITIAL EVALUATION ADULT - SENSORY TESTS
(L) hand  5/5, (R) hand  5/5. CN Testing: B/L Frontalis intact; B/L buccinator intact; smile symmetrical; tongue protrusion at midline; B/L eyes open/close intact; Shoulder elevation: intact bilaterally; Vision H-Test: bilateral tracking and smooth pursuit intact; Convergence/Divergence: intact; Vision Quadrant Test: intact bilaterally.

## 2023-05-20 NOTE — PHYSICAL THERAPY INITIAL EVALUATION ADULT - GENERAL OBSERVATIONS, REHAB EVAL
PT IE completed. Chart reviewed. Pt received seated in chair, NAD, +tele, +A line, +EVD (clamped as per ODETTE Westfall), +crani incision C/D/I. ODETTE Westfall cleared pt for PT.

## 2023-05-20 NOTE — PROGRESS NOTE ADULT - SUBJECTIVE AND OBJECTIVE BOX
OTOLARYNGOLOGY (ENT) PROGRESS NOTE    PATIENT: MARIA FERNANDA VILLARREAL  MRN: [id number]  : 62  [id number]  DATE OF SERVICE:  23  			           ID:MARIA FERNANDA VILLARREAL is a  63yFemale S/P L EVD placement and L temp crani for CSF leak repair w/ ENT    Subjective/ Interval:   No issues overnight, pain minimal, mastoid soft head compression dressing in place, EVD open at 5cc  ; patient seen bedside, dressings in place, EVD clamped until 7  : NAEON. Patient tmax 100.5, otherwise afebrile and vitals stable. Pain well controlled. Denies headache, nausea/vomiting, or dizziness. GRECIA removed. EVD open at 5. Cultures NGTD.    ALLERGIES:  penicillins (Anaphylaxis)  dairy products (Angioedema)      MEDICATIONS:  Antiinfectives:     IV fluids:  sodium chloride 0.9% Bolus 500 milliLiter(s) IV Bolus once  sodium chloride 0.9% Bolus 500 milliLiter(s) IV Bolus once    Hematologic/Anticoagulation:  enoxaparin Injectable 40 milliGRAM(s) SubCutaneous every 24 hours    Pain medications/Neuro:  acetaminophen     Tablet .. 650 milliGRAM(s) Oral every 6 hours PRN  HYDROmorphone  Injectable 0.5 milliGRAM(s) IV Push every 3 hours PRN  levETIRAcetam 500 milliGRAM(s) Oral two times a day  metoclopramide Injectable 10 milliGRAM(s) IV Push every 8 hours PRN  traMADol 25 milliGRAM(s) Oral every 4 hours PRN  traMADol 50 milliGRAM(s) Oral every 4 hours PRN    Endocrine Medications:     All other standing medications:   ofloxacin 0.3% Ophthalmic Solution for OTIC Use 3 Drop(s) Left Ear three times a day  polyethylene glycol 3350 17 Gram(s) Oral two times a day  senna 2 Tablet(s) Oral at bedtime    All other PRN medications:  bisacodyl 5 milliGRAM(s) Oral daily PRN  labetalol Injectable 10 milliGRAM(s) IV Push every 2 hours PRN    Vital Signs Last 24 Hrs  T(C): 36.8 (20 May 2023 09:00), Max: 38.1 (19 May 2023 22:10)  T(F): 98.2 (20 May 2023 09:00), Max: 100.5 (19 May 2023 22:10)  HR: 56 (20 May 2023 10:23) (50 - 81)  BP: 122/67 (20 May 2023 10:23) (113/55 - 142/65)  BP(mean): 89 (20 May 2023 10:23) (63 - 99)  RR: 21 (20 May 2023 10:23) (11 - 26)  SpO2: 95% (20 May 2023 10:23) (93% - 100%)    Parameters below as of 20 May 2023 11:00  Patient On (Oxygen Delivery Method): room air       @ 07:  -   @ 07:00  --------------------------------------------------------  IN:    IV PiggyBack: 250 mL    Lactated Ringers Bolus: 1000 mL    Oral Fluid: 470 mL    Sodium Chloride 0.9% Bolus: 500 mL  Total IN: 2220 mL    OUT:    External Ventricular Device (mL): 325 mL    Voided (mL): 4200 mL  Total OUT: 4525 mL    Total NET: -2305 mL       @ 07:  -   @ 11:19  --------------------------------------------------------  IN:    Oral Fluid: 480 mL    Sodium Chloride 0.9% Bolus: 500 mL  Total IN: 980 mL    OUT:    External Ventricular Device (mL): 40 mL    Voided (mL): 1000 mL  Total OUT: 1040 mL    Total NET: -60 mL      23 @ 07:  -  23 @ 07:00  --------------------------------------------------------  IN:  Total IN: 0 mL    OUT:    External Ventricular Device (mL): 325 mL  Total OUT: 325 mL    Total NET: -325 mL      23 @ 07:01  -  23 @ 11:19  --------------------------------------------------------  IN:  Total IN: 0 mL    OUT:    External Ventricular Device (mL): 40 mL  Total OUT: 40 mL    Total NET: -40 mL      PE  General: patient seen laying supine in bed in NAD  Neuro: AAOx3, speech clear and fluent, EVD in place  HEENT: EOMI, mastoid soft, incision c/d/i, small edema around superior incision  Pulmonary: regular respirations, non labored   Abdomen: soft, nontender, nondistended  Ext: perfusing well  Skin: warm, dry        LABS                       11.0   7.45  )-----------( 166      ( 20 May 2023 05:15 )             34.0        141  |  104  |  6<L>  ----------------------------<  100<H>  3.7   |  29  |  0.47<L>    Ca    8.3<L>      20 May 2023 05:15  Phos  3.4       Mg     2.2             Endocrine Panel-  Calcium, Total Serum: 8.3 mg/dL ( @ 05:15)      MICROBIOLOGY:  Culture - Tissue with Gram Stain (collected 23 @ 16:17)  Source: .Tissue 2. Left Mastoid  Gram Stain (23 @ 22:27):    No organisms seen    Rare WBC's  Preliminary Report (23 @ 08:36):    No growth to date    Culture - Tissue with Gram Stain (collected 23 @ 16:17)  Source: .Tissue 1. Left Middle Ear Tissue  Gram Stain (23 @ 22:27):    No organisms seen    Rare WBC's  Preliminary Report (23 @ 08:34):    No growth to date      Culture Results:   No growth to date (23 @ 19:30)  Culture Results:   No growth to date (23 @ 16:17)  Culture Results:   No growth to date (23 @ 16:17)      Culture - CSF with Gram Stain (collected 23 @ 19:30)  Source: .CSF csf or spec  Gram Stain (23 @ 21:40):    No organisms seen    Rare WBC's  Preliminary Report (23 @ 12:33):    No growth to date    Culture - Tissue with Gram Stain (collected 23 @ 16:17)  Source: .Tissue 2. Left Mastoid  Gram Stain (23 @ 22:27):    No organisms seen    Rare WBC's  Preliminary Report (23 @ 08:36):    No growth to date    Culture - Tissue with Gram Stain (collected 23 @ 16:17)  Source: .Tissue 1. Left Middle Ear Tissue  Gram Stain (23 @ 22:27):    No organisms seen    Rare WBC's  Preliminary Report (23 @ 08:34):    No growth to date

## 2023-05-20 NOTE — PHYSICAL THERAPY INITIAL EVALUATION ADULT - THERAPY FREQUENCY, PT EVAL
Patient educated on frequency of inpatient physical therapy at [facility], patient verbalized understanding./2-3x/week

## 2023-05-20 NOTE — PROGRESS NOTE ADULT - SUBJECTIVE AND OBJECTIVE BOX
HPI:  HPI: 63  female comes to ER with complaint of severe left side head/face pain, intermittent dizziness, also intermittent left CSF otorrhea. She has hx of left cholesteatoma, chronic left mastoiditis  She had an eustachian ventilating tube placed in 2020 (?) which was removed about 1 month ago. She admits that since removal of  tube she is having CSF leakage from left ear and pressure like left side headache.  She states that today she had severe left side headache and left facial like pain and decided to come to ER. She requested to  speak  with Dr. Ramirez who is following her for CSF leak.   CT head is negative for acute pathology; no hemorrhage, no stroke, no hydrocephalus. (16 May 2023 16:15)    OVERNIGHT EVENTS: KAMAR, neuro stable    Hospital Course:   5/16: Admitted for OR tomorrow. CT haydee complete. S/p L EVD placement and L temp crani for CSF leak repair w/ ENT.   5/17: POD0 Left craniotomy for repair of left otorrhea, intra op EVD placement on 05/17/23. Empiric meningitis coverage started, f/u ID recs.   5/18: POD1, KAMAR overnight. Tramadol started for incisional pain. CT haydee completed today. Flagyl dc'd, cefepime added. Oliveira removed, f/u TOV.  5/19: POD2, kamar overnight. Peripheral IV placed. SG GRECIA d/c. abx d/c per ID. Pt endorsed CP, non-radiating. EKG unremarkable and cardiac enzymes WNL. SQL started.   5/20: POD3, given 50o cc bolus for euvolemia.     Vital Signs Last 24 Hrs  T(C): 38.1 (19 May 2023 22:10), Max: 38.1 (19 May 2023 22:10)  T(F): 100.5 (19 May 2023 22:10), Max: 100.5 (19 May 2023 22:10)  HR: 56 (20 May 2023 00:00) (50 - 81)  BP: 114/58 (20 May 2023 00:00) (109/53 - 142/65)  BP(mean): 82 (20 May 2023 00:00) (63 - 99)  RR: 13 (20 May 2023 00:00) (12 - 26)  SpO2: 97% (20 May 2023 00:00) (90% - 98%)    Parameters below as of 20 May 2023 00:00  Patient On (Oxygen Delivery Method): room air        I&O's Summary    18 May 2023 07:01  -  19 May 2023 07:00  --------------------------------------------------------  IN: 2135 mL / OUT: 4437 mL / NET: -2302 mL    19 May 2023 07:01  -  20 May 2023 00:30  --------------------------------------------------------  IN: 2220 mL / OUT: 3918 mL / NET: -1698 mL        PHYSICAL EXAM:  General: patient seen laying supine in bed in NAD on RA  Neuro: AAOx3, FC, OE spontaneously, speech clear and fluent, face symmetric, no pronator drift,  strength 5/5 b/l UE and LE, sensation grossly intact to light touch throughout  HEENT: PERRL, EOMI  Pulmonary: chest rise symmetric  Abdomen: soft, nontender, nondistended  Ext: perfusing well  Skin: warm, dry  Wound: Crani incision site w/ baci and xeroform     TUBES/LINES:  [] Oliveira  [] Lumbar Drain  [] Wound Drains  [x] Others - EVD open @5cmh20      DIET:  [] NPO  [x] Mechanical  [] Tube feeds    LABS:                        11.1   9.44  )-----------( 183      ( 19 May 2023 05:30 )             34.0     05-19    143  |  109<H>  |  5<L>  ----------------------------<  97  3.6   |  26  |  0.49<L>    Ca    7.8<L>      19 May 2023 05:30  Phos  2.7     05-19  Mg     2.3     05-19          CARDIAC MARKERS ( 19 May 2023 17:12 )  x     / <0.01 ng/mL / 400 U/L / x     / 3.0 ng/mL      CAPILLARY BLOOD GLUCOSE      POCT Blood Glucose.: 96 mg/dL (19 May 2023 06:18)      Drug Levels: [] N/A  Vancomycin Level, Trough: 9.1 ug/mL (05-19 @ 12:34)  Vancomycin Level, Trough: 6.5 ug/mL (05-18 @ 13:23)    CSF Analysis: [] N/A      Allergies    penicillins (Anaphylaxis)  dairy products (Angioedema)    Intolerances      MEDICATIONS:  Antibiotics:    Neuro:  acetaminophen     Tablet .. 650 milliGRAM(s) Oral every 6 hours PRN  HYDROmorphone  Injectable 0.5 milliGRAM(s) IV Push every 3 hours PRN  levETIRAcetam 500 milliGRAM(s) Oral two times a day  metoclopramide Injectable 10 milliGRAM(s) IV Push every 8 hours PRN  ondansetron Injectable 4 milliGRAM(s) IV Push every 6 hours  traMADol 50 milliGRAM(s) Oral every 4 hours PRN  traMADol 25 milliGRAM(s) Oral every 4 hours PRN    Anticoagulation:  enoxaparin Injectable 40 milliGRAM(s) SubCutaneous every 24 hours    OTHER:  atorvastatin 40 milliGRAM(s) Oral at bedtime  bisacodyl 5 milliGRAM(s) Oral daily PRN  labetalol Injectable 10 milliGRAM(s) IV Push every 2 hours PRN  ofloxacin 0.3% Ophthalmic Solution for OTIC Use 3 Drop(s) Left Ear three times a day  polyethylene glycol 3350 17 Gram(s) Oral two times a day  senna 2 Tablet(s) Oral at bedtime    IVF:    CULTURES:  Culture Results:   No growth to date (05-17 @ 19:30)  Culture Results:   No growth to date (05-17 @ 16:17)    RADIOLOGY & ADDITIONAL TESTS:  < from: CT Head No Cont (05.18.23 @ 15:06) >  Impression:    [facility]ia protocol    Status post left temporal craniotomy/craniectomy and canal wall down   mastoidectomy a tiny extra-axial focus of air minimal fluid and blood   product underlying the craniotomy site. Status post left frontal angel   hole and placement of the EVD. No evidence of hydrocephalus.    --- End of Report ---    < end of copied text >      ASSESSMENT:  63 years old female with left otorrhea admitted from ED with severe left sided headache and intermittent dizzinesss for Left craniotomy for repair of left otorrhea, intra op EVD placement on 05/17/23.      VERTIGO; OTORRHEA, LEFT    Family history of hypertension (Mother)    Handoff    MEWS Score    Chronic serous otitis media    Hypertension    Hypercholesteremia    Obesity    Otorrhea of left ear    Trauma    CSF otorrhea    HLD (hyperlipidemia)    CSF otorrhea    Tegmen defect of base of skull    CSF otorrhea    Tegmen defect of base of skull    Vertigo    Hypertension    Insertion or replacement of external ventricular drain (EVD)    H/O myringotomy    H/O nasal polypectomy    History of appendectomy    HEADACHE    Hypercholesteremia    Otorrhea of left ear    90+    Otorrhea, left    [email or web address]        PLAN:  Neuro:  - neuro/vitals q1h  - CT haydee complete 5/16, postop CT haydee completed  - EVD open @5cmH20, monitor outputs  - Pain control prn tramadol, dilaudid     Cardio:  - SBP<140, qtc 431 5/19  - restart home losartan 50 if needed  - atorvastin 40mg at bedtime (rosuvastatin 40mg daily home med)     Pulm:   - RA    GI:  - regular diet   - bowel regimen  - standing zofran x24h for nausea    Endo:   - ISS    Renal:  - voiding  - IVF while NPO     ID:   - afebrile  - ID consulted for empiric meningitis coverage   - s/p vanc/cefepime (5/17-5/19)   - f/u OR cultures    Heme:  -SCDs for DVT ppx/ SQL     Dispo: pending OR, full code    Discussed w/ Dr. Ramirez        Assessment:  Present when checked    []  GCS  E   V  M     Heart Failure: []Acute, [] acute on chronic , []chronic  Heart Failure:  [] Diastolic (HFpEF), [] Systolic (HFrEF), []Combined (HFpEF and HFrEF), [] RHF, [] Pulm HTN, [] Other    [] MARY, [] ATN, [] AIN, [] other  [] CKD1, [] CKD2, [] CKD 3, [] CKD 4, [] CKD 5, []ESRD    Encephalopathy: [] Metabolic, [] Hepatic, [] toxic, [] Neurological, [] Other    Abnormal Nurtitional Status: [] malnurtition (see nutrition note), [ ]underweight: BMI < 19, [] morbid obesity: BMI >40, [] Cachexia    [] Sepsis  [] hypovolemic shock,[] cardiogenic shock, [] hemorrhagic shock, [] neuogenic shock  [] Acute Respiratory Failure  []Cerebral edema, [] Brain compression/ herniation,   [] Functional quadriplegia  [] Acute blood loss anemia

## 2023-05-20 NOTE — OCCUPATIONAL THERAPY INITIAL EVALUATION ADULT - LIVES WITH, PROFILE
in private home with elevator access and ramp access. House is 3 floors, however able to access and navigate without using stairs./spouse

## 2023-05-20 NOTE — OCCUPATIONAL THERAPY INITIAL EVALUATION ADULT - MODALITIES TREATMENT COMMENTS
Cranial Nerves II - XII: II: PERRLA; visual fields are full to confrontation III, IV, VI: EOMI, no nystagmus appreciated V: facial sensation intact to light touch V1-V3 b/l VII: no ptosis, no facial droop, symmetric eyebrow raise and closure VIII: hearing intact to finger rub b/l  XI: head turning and shoulder shrug intact b/l XII: tongue protrusion midline // .Pt able to ambulate in room/hallway with CGAx1 hand held assist, demo slow and cautious movement, as well as decreased step length, however no LOB observed throughout.

## 2023-05-20 NOTE — OCCUPATIONAL THERAPY INITIAL EVALUATION ADULT - GENERAL OBSERVATIONS, REHAB EVAL
OT IE completed. Pt admitted to [facility] for L craniotomy and repair of L otorrhea. Orders received, chart reviewed, pt cleared for OT by ODETTE Westfall. Pt received sitting in bedside chair, NAD, +IV, +a-line, +EVD (clamped per ODETTE Westfall), +crani incision C/D/I. Pt A&Ox4, agreeable to OT, and tolerated session well.

## 2023-05-20 NOTE — OCCUPATIONAL THERAPY INITIAL EVALUATION ADULT - DIAGNOSIS, OT EVAL
Pt presenting with impaired balance, and decreased functional activity tolerance, impacting ability to perform functional mobility/ADLs.

## 2023-05-20 NOTE — OCCUPATIONAL THERAPY INITIAL EVALUATION ADULT - PERTINENT HX OF CURRENT PROBLEM, REHAB EVAL
63 years old female with left otorrhea admitted from ED with severe left sided headache and intermittent dizzinesss for Left craniotomy for repair of left otorrhea, intra op EVD placement on 05/17/23.

## 2023-05-21 LAB
ANION GAP SERPL CALC-SCNC: 8 MMOL/L — SIGNIFICANT CHANGE UP (ref 5–17)
BUN SERPL-MCNC: 7 MG/DL — SIGNIFICANT CHANGE UP (ref 7–23)
CALCIUM SERPL-MCNC: 8.3 MG/DL — LOW (ref 8.4–10.5)
CHLORIDE SERPL-SCNC: 102 MMOL/L — SIGNIFICANT CHANGE UP (ref 96–108)
CO2 SERPL-SCNC: 28 MMOL/L — SIGNIFICANT CHANGE UP (ref 22–31)
CREAT SERPL-MCNC: 0.54 MG/DL — SIGNIFICANT CHANGE UP (ref 0.5–1.3)
EGFR: 104 ML/MIN/1.73M2 — SIGNIFICANT CHANGE UP
GLUCOSE SERPL-MCNC: 97 MG/DL — SIGNIFICANT CHANGE UP (ref 70–99)
HCT VFR BLD CALC: 33.4 % — LOW (ref 34.5–45)
HGB BLD-MCNC: 10.9 G/DL — LOW (ref 11.5–15.5)
MAGNESIUM SERPL-MCNC: 2 MG/DL — SIGNIFICANT CHANGE UP (ref 1.6–2.6)
MCHC RBC-ENTMCNC: 28.9 PG — SIGNIFICANT CHANGE UP (ref 27–34)
MCHC RBC-ENTMCNC: 32.6 GM/DL — SIGNIFICANT CHANGE UP (ref 32–36)
MCV RBC AUTO: 88.6 FL — SIGNIFICANT CHANGE UP (ref 80–100)
NRBC # BLD: 0 /100 WBCS — SIGNIFICANT CHANGE UP (ref 0–0)
PHOSPHATE SERPL-MCNC: 3.8 MG/DL — SIGNIFICANT CHANGE UP (ref 2.5–4.5)
PLATELET # BLD AUTO: 190 K/UL — SIGNIFICANT CHANGE UP (ref 150–400)
POTASSIUM SERPL-MCNC: 4 MMOL/L — SIGNIFICANT CHANGE UP (ref 3.5–5.3)
POTASSIUM SERPL-SCNC: 4 MMOL/L — SIGNIFICANT CHANGE UP (ref 3.5–5.3)
RBC # BLD: 3.77 M/UL — LOW (ref 3.8–5.2)
RBC # FLD: 12.9 % — SIGNIFICANT CHANGE UP (ref 10.3–14.5)
SODIUM SERPL-SCNC: 138 MMOL/L — SIGNIFICANT CHANGE UP (ref 135–145)
WBC # BLD: 6.38 K/UL — SIGNIFICANT CHANGE UP (ref 3.8–10.5)
WBC # FLD AUTO: 6.38 K/UL — SIGNIFICANT CHANGE UP (ref 3.8–10.5)

## 2023-05-21 PROCEDURE — 99291 CRITICAL CARE FIRST HOUR: CPT

## 2023-05-21 RX ORDER — CHLORHEXIDINE GLUCONATE 213 G/1000ML
1 SOLUTION TOPICAL DAILY
Refills: 0 | Status: DISCONTINUED | OUTPATIENT
Start: 2023-05-21 | End: 2023-05-24

## 2023-05-21 RX ADMIN — CHLORHEXIDINE GLUCONATE 1 APPLICATION(S): 213 SOLUTION TOPICAL at 11:36

## 2023-05-21 RX ADMIN — TRAMADOL HYDROCHLORIDE 50 MILLIGRAM(S): 50 TABLET ORAL at 22:35

## 2023-05-21 RX ADMIN — POLYETHYLENE GLYCOL 3350 17 GRAM(S): 17 POWDER, FOR SOLUTION ORAL at 06:00

## 2023-05-21 RX ADMIN — ENOXAPARIN SODIUM 40 MILLIGRAM(S): 100 INJECTION SUBCUTANEOUS at 22:04

## 2023-05-21 RX ADMIN — Medication 650 MILLIGRAM(S): at 07:10

## 2023-05-21 RX ADMIN — LEVETIRACETAM 500 MILLIGRAM(S): 250 TABLET, FILM COATED ORAL at 17:49

## 2023-05-21 RX ADMIN — TRAMADOL HYDROCHLORIDE 50 MILLIGRAM(S): 50 TABLET ORAL at 11:28

## 2023-05-21 RX ADMIN — TRAMADOL HYDROCHLORIDE 50 MILLIGRAM(S): 50 TABLET ORAL at 05:59

## 2023-05-21 RX ADMIN — TRAMADOL HYDROCHLORIDE 50 MILLIGRAM(S): 50 TABLET ORAL at 22:04

## 2023-05-21 RX ADMIN — TRAMADOL HYDROCHLORIDE 50 MILLIGRAM(S): 50 TABLET ORAL at 19:07

## 2023-05-21 RX ADMIN — TRAMADOL HYDROCHLORIDE 50 MILLIGRAM(S): 50 TABLET ORAL at 11:58

## 2023-05-21 RX ADMIN — TRAMADOL HYDROCHLORIDE 50 MILLIGRAM(S): 50 TABLET ORAL at 00:00

## 2023-05-21 RX ADMIN — TRAMADOL HYDROCHLORIDE 50 MILLIGRAM(S): 50 TABLET ORAL at 06:30

## 2023-05-21 RX ADMIN — Medication 650 MILLIGRAM(S): at 07:40

## 2023-05-21 RX ADMIN — SENNA PLUS 2 TABLET(S): 8.6 TABLET ORAL at 22:04

## 2023-05-21 RX ADMIN — Medication 650 MILLIGRAM(S): at 15:20

## 2023-05-21 RX ADMIN — Medication 650 MILLIGRAM(S): at 15:48

## 2023-05-21 RX ADMIN — LEVETIRACETAM 500 MILLIGRAM(S): 250 TABLET, FILM COATED ORAL at 06:00

## 2023-05-21 RX ADMIN — TRAMADOL HYDROCHLORIDE 50 MILLIGRAM(S): 50 TABLET ORAL at 18:00

## 2023-05-21 NOTE — PROGRESS NOTE ADULT - SUBJECTIVE AND OBJECTIVE BOX
PM EVENTS: no acute overnight events as of documentation time of this note.    ROS: negative except as mentioned above.    T(C): 37 (05-21-23 @ 17:43), Max: 37.6 (05-21-23 @ 05:16)  HR: 53 (05-21-23 @ 19:00) (48 - 74)  BP: --  RR: 13 (05-21-23 @ 19:00) (11 - 25)  SpO2: 99% (05-21-23 @ 19:00) (91% - 99%)        I&O's Summary    20 May 2023 07:01  -  21 May 2023 07:00  --------------------------------------------------------  IN: 1700 mL / OUT: 3237 mL / NET: -1537 mL    21 May 2023 07:01  -  21 May 2023 19:16  --------------------------------------------------------  IN: 240 mL / OUT: 861 mL / NET: -621 mL        EXAM:     [place] Coma Scale:     General: normocephalic, atraumatic, laying in bed, in no distress  Neuro     MS: A/Ox3, cooperative, normal attention, no neglect, comprehension intact, speech with preserved fluency, naming, and repetition    CN: PERRL, VF FTC, EOMI and no ptosis bilaterally, sensation intact to crude touch V1-V3, face symmetric, hearing grossly intact    Mot: bulk normal, tone normal, power 5/5 in bilateral upper and lower proximal extremities    Sens: intact to crude touch in bilateral upper and lower extremities    Reflexes: deferred    Coord: no dysmetria or ataxia on finger to nose/heel to shin, respectively, no focal bradykinetic movements    Gait: deferred  Chest: nonlabored respirations, no adventitious lung sounds bilaterally, heart regular rate/rhythm, present S1/S2, no murmurs or rubs  Abdomen: nondistended, soft and nontender without peritoneal signs, normoactive bowel sounds  Extremities: no clubbing, well-perfused, no edema                              10.9   6.38  )-----------( 190      ( 21 May 2023 05:02 )             33.4     05-21    138  |  102  |  7   ----------------------------<  97  4.0   |  28  |  0.54    Ca    8.3<L>      21 May 2023 05:02  Phos  3.8     05-21  Mg     2.0     05-21        MEDICATIONS  (STANDING):  chlorhexidine 2% Cloths 1 Application(s) Topical daily  enoxaparin Injectable 40 milliGRAM(s) SubCutaneous every 24 hours  levETIRAcetam 500 milliGRAM(s) Oral two times a day  polyethylene glycol 3350 17 Gram(s) Oral two times a day  senna 2 Tablet(s) Oral at bedtime    MEDICATIONS  (PRN):  acetaminophen     Tablet .. 650 milliGRAM(s) Oral every 6 hours PRN Temp greater or equal to 38C (100.4F), Mild Pain (1 - 3)  bisacodyl 5 milliGRAM(s) Oral daily PRN Constipation  metoclopramide Injectable 10 milliGRAM(s) IV Push every 8 hours PRN nausea/vomiting 2nd line  traMADol 50 milliGRAM(s) Oral every 4 hours PRN Severe Pain (7 - 10)  traMADol 25 milliGRAM(s) Oral every 4 hours PRN Moderate Pain (4 - 6)      Please see the day's note documented by Dr. Carey for detailed ongoing assessment and plan.      #S/p L. crani for repair of CSF leak/CSF otorrhea & intra-op EVD placement, POD#4  Remains in NSICU for frequent neuro checks/vitals and EVD management  Pain control  SBP < 140  On room air  Regular diet  Off Abx  SCDs/SQL PM EVENTS:     - Pt c/o clear fluid drainage from ear "after ENT removing packing"  - Per ENT, no packing was removed  - Will send b2 transferrin should drainage be abundant enough for sample collection    ROS: negative except as mentioned above.    T(C): 37 (05-21-23 @ 17:43), Max: 37.6 (05-21-23 @ 05:16)  HR: 53 (05-21-23 @ 19:00) (48 - 74)  BP: --  RR: 13 (05-21-23 @ 19:00) (11 - 25)  SpO2: 99% (05-21-23 @ 19:00) (91% - 99%)        I&O's Summary    20 May 2023 07:01  -  21 May 2023 07:00  --------------------------------------------------------  IN: 1700 mL / OUT: 3237 mL / NET: -1537 mL    21 May 2023 07:01  -  21 May 2023 19:16  --------------------------------------------------------  IN: 240 mL / OUT: 861 mL / NET: -621 mL        EXAM:     [place] Coma Scale: 15    General: normocephalic, cranial surgical scars, EVD, laying in bed, in no distress  Neuro     MS: A/Ox3, cooperative, normal attention, no neglect, comprehension intact, speech with preserved fluency, naming, and repetition    CN: PERRL, VF FTC, EOMI and no ptosis bilaterally, sensation intact to crude touch V1-V3, face symmetric, hearing grossly intact    Mot: bulk normal, tone normal, power 5/5 in bilateral upper and lower proximal extremities    Sens: intact to crude touch in bilateral upper and lower extremities    Reflexes: deferred    Coord: no dysmetria or ataxia on finger to nose/heel to shin, respectively, no focal bradykinetic movements    Gait: deferred  Chest: nonlabored respirations, no adventitious lung sounds bilaterally, heart regular rate/rhythm, present S1/S2, no murmurs or rubs  Abdomen: nondistended, soft and nontender without peritoneal signs, normoactive bowel sounds  Extremities: no clubbing, well-perfused, no edema                              10.9   6.38  )-----------( 190      ( 21 May 2023 05:02 )             33.4     05-21    138  |  102  |  7   ----------------------------<  97  4.0   |  28  |  0.54    Ca    8.3<L>      21 May 2023 05:02  Phos  3.8     05-21  Mg     2.0     05-21        MEDICATIONS  (STANDING):  chlorhexidine 2% Cloths 1 Application(s) Topical daily  enoxaparin Injectable 40 milliGRAM(s) SubCutaneous every 24 hours  levETIRAcetam 500 milliGRAM(s) Oral two times a day  polyethylene glycol 3350 17 Gram(s) Oral two times a day  senna 2 Tablet(s) Oral at bedtime    MEDICATIONS  (PRN):  acetaminophen     Tablet .. 650 milliGRAM(s) Oral every 6 hours PRN Temp greater or equal to 38C (100.4F), Mild Pain (1 - 3)  bisacodyl 5 milliGRAM(s) Oral daily PRN Constipation  metoclopramide Injectable 10 milliGRAM(s) IV Push every 8 hours PRN nausea/vomiting 2nd line  traMADol 50 milliGRAM(s) Oral every 4 hours PRN Severe Pain (7 - 10)  traMADol 25 milliGRAM(s) Oral every 4 hours PRN Moderate Pain (4 - 6)      Please see the day's note documented by Dr. Carey for detailed ongoing assessment and plan.      #S/p L. crani for repair of CSF leak/CSF otorrhea & intra-op EVD placement, POD#4  Remains in NSICU for frequent neuro checks/vitals and EVD management  Pain control  B2 transferrin pending sample collection  SBP < 140  On room air  Regular diet  Off Abx  SCDs/SQL

## 2023-05-21 NOTE — PROGRESS NOTE ADULT - SUBJECTIVE AND OBJECTIVE BOX
=================================  NEUROCRITICAL CARE ATTENDING NOTE  =================================    MARIA FERNANDA VILLARREAL   MRN-[id number]  Summary:  63y/F presented to ER ER with complaint of severe left side head/face pain, intermittent dizziness, also intermittent left CSF otorrhea. She has hx of left cholesteatoma, chronic left mastoiditis  She had an eustachian ventilating tube placed in  (?) which was removed about 1 month ago. She admits that since removal of  tube she is having CSF leakage from left ear and pressure like left side headache.  She states that today she had severe left side headache and left facial like pain and decided to come to ER. She requested to  speak  with Dr. Ramirez who is following her for CSF leak.   CT head is negative for acute pathology; no hemorrhage, no stroke, no hydrocephalus. (16 May 2023 16:15)    COURSE IN THE HOSPITAL:   admitted   POD 0 with EVD insertion    POD 1 dizziness this AM   POD 2 c/o headaches   POD 3 Tmax 38.1, high EVD drain output   POD 4 Tmax 37.6    Past Medical History: Chronic serous otitis media Hypertension Hypercholesteremia Obesity Otorrhea of left ear Trauma CSF otorrhea HLD (hyperlipidemia)  Allergies:  penicillins (Anaphylaxis) dairy products (Angioedema)  Home meds:   ·	losartan 50 mg oral tablet: 1 orally once a day (at bedtime)  ·	rosuvastatin 40 mg oral capsule: 1 orally once a day    PHYSICAL EXAMINATION  T(C): 37.6 ( @ 05:16), Max: 37.6 ( @ 05:16) HR: 60 ( @ 07:00) (49 - 74) BP: 119/58 ( @ 19:00) (112/60 - 135/65) RR: 14 ( @ 07:00) (10 - 22) SpO2: 93% ( @ 07:00) (92% - 99%)  NEUROLOGIC EXAMINATION:  Patient is awake, alert, fully oriented, pupils 2-3mm equal and briskly reactive to light, EOMs intact, muscle strength 5/5 on all 4s, no nystagmus, hearing intact  GENERAL: not intubated, not in cardiorespiratory distress  EENT:  anicteric  CARDIOVASCULAR: (+) S1 S2, bradycardic rate and regular rhythm  PULMONARY: clear to auscultation bilaterally  ABDOMEN: soft, nontender with normoactive bowel sounds  EXTREMITIES: no edema  SKIN: no rash    LABS:     (7.45)   10.9 (11.0)  6.38  )-----------( 190      ( 21 May 2023 05:02 )             33.4      138  |  102  |  7   ----------------------------<  97  4.0   |  28  |  0.54    Ca    8.3<L>      21 May 2023 05:02  Phos  3.8       Mg     2.0      @ 07:01  -   @ 07:00  IN: 1700 mL / OUT: 3237 mL / NET: -1537 mL    Bacteriology:   CSF culture NGTD   Tissue: NGTD x2     CSF studies:    L   *** [id number] WBC0 *** %N   %L       EEG:  Neuroimaging:  Other imaging:    MEDICATIONS:     ·	enoxaparin Injectable 40 SubCutaneous every 24 hours  ·	levETIRAcetam 500 Oral two times a day  ·	polyethylene glycol 3350 17 Oral two times a day  ·	senna 2 Oral at bedtime  ·	acetaminophen     Tablet .. 650 Oral every 6 hours PRN  ·	bisacodyl 5 Oral daily PRN  ·	metoclopramide Injectable 10 IV Push every 8 hours PRN  ·	traMADol 25 Oral every 4 hours PRN  ·	traMADol 50 Oral every 4 hours PRN    IV FLUIDS: IVL  DRIPS:  DIET: regular diet   Lines: RAUL De Oliveira   Drains:     EVD @10cm Hg ICPs single output 131 /24h   EVD @ 5cm H20 ICPs 8-12 output 362 /24h   EVD @ 5cm H20 ICPs <10 output 325/24h  Wounds:    CODE STATUS:  Full Code                       GOALS OF CARE:  aggressive                      DISPOSITION:  ICU =================================  NEUROCRITICAL CARE ATTENDING NOTE  =================================    MARIA FERNANDA VILLARREAL   MRN-[id number]  Summary:  63y/F presented to ER ER with complaint of severe left side head/face pain, intermittent dizziness, also intermittent left CSF otorrhea. She has hx of left cholesteatoma, chronic left mastoiditis  She had an eustachian ventilating tube placed in  (?) which was removed about 1 month ago. She admits that since removal of  tube she is having CSF leakage from left ear and pressure like left side headache.  She states that today she had severe left side headache and left facial like pain and decided to come to ER. She requested to  speak  with Dr. Ramirez who is following her for CSF leak.   CT head is negative for acute pathology; no hemorrhage, no stroke, no hydrocephalus. (16 May 2023 16:15)    COURSE IN THE HOSPITAL:   admitted   POD 0 with EVD insertion    POD 1 dizziness this AM   POD 2 c/o headaches   POD 3 Tmax 38.1, high EVD drain output   POD 4 Tmax 37.6    Past Medical History: Chronic serous otitis media Hypertension Hypercholesteremia Obesity Otorrhea of left ear Trauma CSF otorrhea HLD (hyperlipidemia)  Allergies:  penicillins (Anaphylaxis) dairy products (Angioedema)  Home meds:   ·	losartan 50 mg oral tablet: 1 orally once a day (at bedtime)  ·	rosuvastatin 40 mg oral capsule: 1 orally once a day    PHYSICAL EXAMINATION  T(C): 37.6 ( @ 05:16), Max: 37.6 ( @ 05:16) HR: 60 ( @ 07:00) (49 - 74) BP: 119/58 ( @ 19:00) (112/60 - 135/65) RR: 14 ( @ 07:00) (10 - 22) SpO2: 93% ( @ 07:00) (92% - 99%)  NEUROLOGIC EXAMINATION:  Patient is awake, alert, fully oriented, pupils 2-3mm equal and briskly reactive to light, EOMs intact, muscle strength 5/5 on all 4s, no nystagmus, hearing intact  GENERAL: not intubated, not in cardiorespiratory distress  EENT:  anicteric  CARDIOVASCULAR: (+) S1 S2, normal rate and regular rhythm  PULMONARY: clear to auscultation bilaterally  ABDOMEN: soft, nontender with normoactive bowel sounds  EXTREMITIES: no edema  SKIN: no rash    LABS:     (7.45)   10.9 (11.0)  6.38  )-----------( 190      ( 21 May 2023 05:02 )             33.4      138  |  102  |  7   ----------------------------<  97  4.0   |  28  |  0.54    Ca    8.3<L>      21 May 2023 05:02  Phos  3.8       Mg     2.0      @ 07:01  -   @ 07:00  IN: 1700 mL / OUT: 3237 mL / NET: -1537 mL    Bacteriology:   CSF culture NGTD   Tissue: NGTD x2     CSF studies:    L   *** [id number] WBC0 *** %N   %L       EEG:  Neuroimaging:  Other imaging:    MEDICATIONS:     ·	enoxaparin Injectable 40 SubCutaneous every 24 hours  ·	levETIRAcetam 500 Oral two times a day  ·	polyethylene glycol 3350 17 Oral two times a day  ·	senna 2 Oral at bedtime  ·	acetaminophen     Tablet .. 650 Oral every 6 hours PRN  ·	bisacodyl 5 Oral daily PRN  ·	metoclopramide Injectable 10 IV Push every 8 hours PRN  ·	traMADol 25 Oral every 4 hours PRN  ·	traMADol 50 Oral every 4 hours PRN    IV FLUIDS: IVL  DRIPS:  DIET: regular diet   Lines: RAUL De Oliveira   Drains:     EVD @10cm Hg ICPs single output 131 /24h   EVD @ 5cm H20 ICPs 8-12 output 362 /24h   EVD @ 5cm H20 ICPs <10 output 325/24h   EVD @ 5 cm H20 ICPs <10  output  237/24h  Wounds:    CODE STATUS:  Full Code                       GOALS OF CARE:  aggressive                      DISPOSITION:  ICU

## 2023-05-21 NOTE — PROGRESS NOTE ADULT - SUBJECTIVE AND OBJECTIVE BOX
SUBJECTIVE: Patient reports continued left decreased hearing, otherwise denies weakness, numbness, tingling, nausea, vomiting, chest pain, palpitations, shortness of breath, vision changes.     HOSPITAL COURSE:  5/16: Admitted for OR tomorrow. CT haydee complete. S/p L EVD placement and L temp crani for CSF leak repair w/ ENT.   5/17: POD0 Left craniotomy for repair of left otorrhea, intra op EVD placement on 05/17/23. Empiric meningitis coverage started, f/u ID recs.   5/18: POD1, CARLO overnight. Tramadol started for incisional pain. CT haydee completed today. Flagyl dc'd, cefepime added. Oliveira removed, f/u TOV.  5/19: POD2, carlo overnight. Peripheral IV placed. SG GRECIA d/c. abx d/c per ID. Pt endorsed CP, non-radiating. EKG unremarkable and cardiac enzymes WNL. SQL started.   5/20: POD3, given 500 cc bolus. EVD output WNL. Given 1L bolus.  5/21: POD 4. EVD @ 5. Neuro stable.     Vital Signs Last 24 Hrs  T(C): 37.4 (20 May 2023 21:25), Max: 37.4 (20 May 2023 21:25)  T(F): 99.3 (20 May 2023 21:25), Max: 99.3 (20 May 2023 21:25)  HR: 71 (20 May 2023 23:00) (49 - 74)  BP: 119/58 (20 May 2023 19:00) (112/60 - 140/62)  BP(mean): 83 (20 May 2023 19:00) (79 - 96)  RR: 22 (20 May 2023 23:00) (10 - 22)  SpO2: 98% (20 May 2023 23:00) (92% - 100%)    Parameters below as of 20 May 2023 23:00  Patient On (Oxygen Delivery Method): room air    I&O's Summary    19 May 2023 07:01  -  20 May 2023 07:00  --------------------------------------------------------  IN: 2220 mL / OUT: 4525 mL / NET: -2305 mL    20 May 2023 07:01  -  21 May 2023 00:45  --------------------------------------------------------  IN: 1700 mL / OUT: 2761 mL / NET: -1061 mL    PHYSICAL EXAM:  General: patient seen laying supine in bed in NAD on RA  Neuro: AAOx3, FC, OE spontaneously, speech clear and fluent, face symmetric, no pronator drift,  strength 5/5 b/l UE and LE, sensation grossly intact to light touch throughout  HEENT: PERRL, EOMI  Pulmonary: chest rise symmetric  Abdomen: soft, nontender, nondistended  Ext: perfusing well  Skin: warm, dry  Wound: Crani incision site w/ baci and xeroform + staples     LABS:                        11.0   7.45  )-----------( 166      ( 20 May 2023 05:15 )             34.0     05-20    141  |  104  |  6<L>  ----------------------------<  100<H>  3.7   |  29  |  0.47<L>    Ca    8.3<L>      20 May 2023 05:15  Phos  3.4     05-20  Mg     2.2     05-20    CARDIAC MARKERS ( 19 May 2023 17:12 )  x     / <0.01 ng/mL / 400 U/L / x     / 3.0 ng/mL    CAPILLARY BLOOD GLUCOSE    Drug Levels: [] N/A  Vancomycin Level, Trough: 9.1 ug/mL (05-19 @ 12:34)  Vancomycin Level, Trough: 6.5 ug/mL (05-18 @ 13:23)    CSF Analysis: [] N/A    Allergies    penicillins (Anaphylaxis)  dairy products (Angioedema)    Intolerances    MEDICATIONS:  Antibiotics:    Neuro:  acetaminophen     Tablet .. 650 milliGRAM(s) Oral every 6 hours PRN  levETIRAcetam 500 milliGRAM(s) Oral two times a day  metoclopramide Injectable 10 milliGRAM(s) IV Push every 8 hours PRN  traMADol 25 milliGRAM(s) Oral every 4 hours PRN  traMADol 50 milliGRAM(s) Oral every 4 hours PRN    Anticoagulation:  enoxaparin Injectable 40 milliGRAM(s) SubCutaneous every 24 hours    OTHER:  bisacodyl 5 milliGRAM(s) Oral daily PRN  polyethylene glycol 3350 17 Gram(s) Oral two times a day  senna 2 Tablet(s) Oral at bedtime    IVF:    CULTURES:  Culture Results:   No growth to date (05-17 @ 19:30)  Culture Results:   No growth to date (05-17 @ 16:17)    RADIOLOGY & ADDITIONAL TESTS:      ASSESSMENT:  63 years old female with left otorrhea admitted from ED with severe left sided headache and intermittent dizzinesss for Left craniotomy for repair of left otorrhea, intra op EVD placement on 05/17/23.    Neuro:  - neuro/vitals q1h  - CT haydee complete 5/16, postop CT haydee completed  - EVD open @5 cmH20, monitor outputs  - Pain control prn tramadol, dilaudid     Cardio:  - SBP<140, qtc 431 5/19  - Restart home losartan 50 if needed  - Statin held for myalgia     Pulm:   - RA    GI:  - Regular diet   - Bowel regimen    Endo:   - ISS dced     Renal:  - voiding    ID:   - ID consulted for empiric meningitis coverage, now off antibiotics   - s/p vanc/cefepime (5/17-5/19)   - f/u OR cultures    Heme:  -SCDs for DVT ppx/ SQL     Dispo:   - Pending shunt plan     Discussed w/ Dr. Ramirez and Dr. Beaver

## 2023-05-21 NOTE — PROGRESS NOTE ADULT - SUBJECTIVE AND OBJECTIVE BOX
OTOLARYNGOLOGY (ENT) PROGRESS NOTE    PATIENT: MARIA FERNANDA VILLARREAL  MRN: [id number]  : 62  [id number]  DATE OF SERVICE:  23  			           ID:MARIA FERNANDA VILLARREAL is a  63yFemale S/P L EVD placement and L temp crani for CSF leak repair w/ ENT    Subjective/ Interval:   No issues overnight, pain minimal, mastoid soft head compression dressing in place, EVD open at 5cc  ; patient seen bedside, dressings in place, EVD clamped until 7  : NAEON. Patient tmax 100.5, otherwise afebrile and vitals stable. Pain well controlled. Denies headache, nausea/vomiting, or dizziness. GRECIA removed. EVD open at 5. Cultures NGTD.  : NAEON. AFVSS. Pain well controlled. Denies headache, nausea/vomiting, or dizziness. EVD open at 5. Cultures NGTD.    ALLERGIES:  penicillins (Anaphylaxis)  dairy products (Angioedema)      MEDICATIONS:  Antiinfectives:     IV fluids:    Hematologic/Anticoagulation:  enoxaparin Injectable 40 milliGRAM(s) SubCutaneous every 24 hours    Pain medications/Neuro:  acetaminophen     Tablet .. 650 milliGRAM(s) Oral every 6 hours PRN  levETIRAcetam 500 milliGRAM(s) Oral two times a day  metoclopramide Injectable 10 milliGRAM(s) IV Push every 8 hours PRN  traMADol 50 milliGRAM(s) Oral every 4 hours PRN  traMADol 25 milliGRAM(s) Oral every 4 hours PRN    Endocrine Medications:     All other standing medications:   chlorhexidine 2% Cloths 1 Application(s) Topical daily  polyethylene glycol 3350 17 Gram(s) Oral two times a day  senna 2 Tablet(s) Oral at bedtime    All other PRN medications:  bisacodyl 5 milliGRAM(s) Oral daily PRN    Vital Signs Last 24 Hrs  T(C): 37.2 (21 May 2023 09:17), Max: 37.6 (21 May 2023 05:16)  T(F): 99 (21 May 2023 09:17), Max: 99.6 (21 May 2023 05:16)  HR: 53 (21 May 2023 10:00) (49 - 74)  BP: 119/58 (20 May 2023 19:00) (117/60 - 128/67)  BP(mean): 83 (20 May 2023 19:00) (83 - 92)  RR: 11 (21 May 2023 10:00) (10 - 22)  SpO2: 91% (21 May 2023 10:00) (91% - 99%)    Parameters below as of 21 May 2023 10:00  Patient On (Oxygen Delivery Method): room air       @ 07:01  -   @ 07:00  --------------------------------------------------------  IN:    Oral Fluid: 1200 mL    Sodium Chloride 0.9% Bolus: 500 mL  Total IN: 1700 mL    OUT:    External Ventricular Device (mL): 237 mL    Voided (mL): 3000 mL  Total OUT: 3237 mL    Total NET: -1537 mL       @ 07:  -   @ 11:18  --------------------------------------------------------  IN:    Oral Fluid: 240 mL  Total IN: 240 mL    OUT:    External Ventricular Device (mL): 27 mL    Voided (mL): 150 mL  Total OUT: 177 mL    Total NET: 63 mL        23 @ 07:01  -  23 @ 07:00  --------------------------------------------------------  IN:  Total IN: 0 mL    OUT:    External Ventricular Device (mL): 237 mL  Total OUT: 237 mL    Total NET: -237 mL      23 @ 07:01  -  23 @ 11:18  --------------------------------------------------------  IN:  Total IN: 0 mL    OUT:    External Ventricular Device (mL): 27 mL  Total OUT: 27 mL    Total NET: -27 mL        PHYSICAL EXAM:    General: patient seen laying supine in bed in NAD  Neuro: AAOx3, speech clear and fluent, EVD in place  HEENT: EOMI, mastoid soft, incision c/d/i, small edema around superior incision  Pulmonary: regular respirations, non labored   Abdomen: soft, nontender, nondistended  Ext: perfusing well  Skin: warm, dry      LABS                         10.9   6.38  )-----------( 190      ( 21 May 2023 05:02 )             33.4        138  |  102  |  7   ----------------------------<  97  4.0   |  28  |  0.54      Ca    8.3<L>      21 May 2023 05:02  Phos  3.8     -  Mg     2.0     -      MICROBIOLOGY:  Culture - Tissue with Gram Stain (collected 23 @ 16:17)  Source: .Tissue 2. Left Mastoid  Gram Stain (23 @ 22:27):    No organisms seen    Rare WBC's  Preliminary Report (23 @ 08:36):    No growth to date    Culture - Tissue with Gram Stain (collected 23 @ 16:17)  Source: .Tissue 1. Left Middle Ear Tissue  Gram Stain (23 @ 22:27):    No organisms seen    Rare WBC's  Preliminary Report (23 @ 08:34):    No growth to date      Culture Results:   No growth to date (23 @ 19:30)  Culture Results:   No growth to date (23 @ 16:17)  Culture Results:   No growth to date (23 @ 16:17)      Culture - CSF with Gram Stain (collected 23 @ 19:30)  Source: .CSF csf or spec  Gram Stain (23 @ 21:40):    No organisms seen    Rare WBC's  Preliminary Report (23 @ 12:33):    No growth to date    Culture - Tissue with Gram Stain (collected 23 @ 16:17)  Source: .Tissue 2. Left Mastoid  Gram Stain (23 @ 22:27):    No organisms seen    Rare WBC's  Preliminary Report (23 @ 08:36):    No growth to date    Culture - Tissue with Gram Stain (collected 23 @ 16:17)  Source: .Tissue 1. Left Middle Ear Tissue  Gram Stain (23 @ 22:27):    No organisms seen    Rare WBC's  Preliminary Report (23 @ 08:34):    No growth to date

## 2023-05-21 NOTE — PROGRESS NOTE ADULT - ASSESSMENT
Assessment and Plan:    MARIA FERNANDA VILLARREAL is a 63 years old female with left otorrhea admitted from ED with severe left sided headache and intermittent dizziness for Left craniotomy for repair of left otorrhea, intra op EVD placement on 05/17/23.    Plan:  Neuro:  - Pain control   - F/u csf cultures every 2 days   - Incision care as per NSGY   - ENT continue to follow     [facility] at [phone] with any questions/concerns.

## 2023-05-21 NOTE — PROGRESS NOTE ADULT - ASSESSMENT
63y/F with  CSF otorrhea, h/o cholesteatoma, chronic serous otitis media, s/p L EVD placement, L temporal crani for CSF leak repair (05/17/2023, Dr. Ramirez, Dr. Edis Anthony, Dr. Falcon)  Hypertension dyslipidemia obesity     PLAN:   NEURO: neurochecks q1h PRN pain meds with acetaminophen   EVD  increase to 10cm H20 if ok with NSG, - open to drain monitor ICPs  VPS next week (Wed?)  seizure prophylaxis:  levetiracetam 500mg PO BID, x 7 days   REHAB:  physical therapy evaluation and management    EARLY MOB:   OOB to chair, ambulate    PULM:  Room air, incentive spirometry  CARDIO:  SBP goal 100-140mm Hg  ENDO:  Blood sugar goals 140-180 mg/dL, lipitor  GI:  bowel regimen  DIET: regular diet   RENAL:  Na goal 135-145  HEM/ONC: Hb stable  VTE Prophylaxis:  SCDs, SQL tonight   ID: afebrile, no leukocytosis, continue ofloxacin otic drops, off ABx per ID  Social: daughter updated    Active issues:  What's keeping patient in the ICU? EVD, close neurochecks  What is this patient's dispo plan? stepdown once EVD out    ATTENDING ATTESTATION:  I was physically present for the key portions of the evaluation and management (E/M) service provided.  I agree with the above history, physical and plan, which I have reviewed and edited where appropriate.    Patient at high risk for neurological deterioration or death due to:  ICU delirium, aspiration PNA, DVT / PE.  Critical care time:  I have personally provided 60 minutes of critical care time, excluding time spent on separate procedures.      Plan discussed with RN, house staff.   63y/F with  CSF otorrhea, h/o cholesteatoma, chronic serous otitis media, s/p L EVD placement, L temporal crani for CSF leak repair (05/17/2023, Dr. Ramirez, Dr. Edis Anthony, Dr. Falcon)  Hypertension dyslipidemia obesity     PLAN:   NEURO: neurochecks q1h PRN pain meds with acetaminophen, opiates  EVD  cont 5cm H20 - open to drain monitor ICPs  VPS next week (Wed?)  seizure prophylaxis:  levetiracetam 500mg PO BID, x 7 days   REHAB:  physical therapy evaluation and management    EARLY MOB:   OOB to chair, ambulate    PULM:  Room air, incentive spirometry  CARDIO:  SBP goal 100-140mm Hg  ENDO:  Blood sugar goals 140-180 mg/dL, lipitor  GI:  bowel regimen  DIET: regular diet   RENAL:  Na goal 135-145, IVL  HEM/ONC: Hb stable  VTE Prophylaxis:  SCDs, SQL tonight   ID: afebrile, no leukocytosis, continue ofloxacin otic drops, off ABx per ID  Social: will update daughter    Active issues:  What's keeping patient in the ICU? EVD, close neurochecks  What is this patient's dispo plan? stepdown once EVD out    ATTENDING ATTESTATION:  I was physically present for the key portions of the evaluation and management (E/M) service provided.  I agree with the above history, physical and plan, which I have reviewed and edited where appropriate.    Patient at high risk for neurological deterioration or death due to:  ICU delirium, aspiration PNA, DVT / PE.  Critical care time:  I have personally provided 60 minutes of critical care time, excluding time spent on separate procedures.      Plan discussed with RN, house staff.

## 2023-05-22 LAB
ANION GAP SERPL CALC-SCNC: 9 MMOL/L — SIGNIFICANT CHANGE UP (ref 5–17)
APPEARANCE CSF: SIGNIFICANT CHANGE UP
APPEARANCE SPUN FLD: ABNORMAL
BUN SERPL-MCNC: 6 MG/DL — LOW (ref 7–23)
CALCIUM SERPL-MCNC: 8.4 MG/DL — SIGNIFICANT CHANGE UP (ref 8.4–10.5)
CHLORIDE SERPL-SCNC: 100 MMOL/L — SIGNIFICANT CHANGE UP (ref 96–108)
CO2 SERPL-SCNC: 30 MMOL/L — SIGNIFICANT CHANGE UP (ref 22–31)
COLOR CSF: ABNORMAL
CREAT SERPL-MCNC: 0.5 MG/DL — SIGNIFICANT CHANGE UP (ref 0.5–1.3)
CSF COMMENTS: SIGNIFICANT CHANGE UP
CULTURE RESULTS: NO GROWTH — SIGNIFICANT CHANGE UP
CULTURE RESULTS: NO GROWTH — SIGNIFICANT CHANGE UP
EGFR: 106 ML/MIN/1.73M2 — SIGNIFICANT CHANGE UP
GLUCOSE CSF-MCNC: 55 MG/DL — SIGNIFICANT CHANGE UP (ref 40–70)
GLUCOSE SERPL-MCNC: 101 MG/DL — HIGH (ref 70–99)
GRAM STN FLD: SIGNIFICANT CHANGE UP
HCT VFR BLD CALC: 36.5 % — SIGNIFICANT CHANGE UP (ref 34.5–45)
HGB BLD-MCNC: 11.6 G/DL — SIGNIFICANT CHANGE UP (ref 11.5–15.5)
LYMPHOCYTES # CSF: 4 % — SIGNIFICANT CHANGE UP (ref 40–80)
MAGNESIUM SERPL-MCNC: 2.1 MG/DL — SIGNIFICANT CHANGE UP (ref 1.6–2.6)
MCHC RBC-ENTMCNC: 28.3 PG — SIGNIFICANT CHANGE UP (ref 27–34)
MCHC RBC-ENTMCNC: 31.8 GM/DL — LOW (ref 32–36)
MCV RBC AUTO: 89 FL — SIGNIFICANT CHANGE UP (ref 80–100)
MONOS+MACROS NFR CSF: 1 % — SIGNIFICANT CHANGE UP (ref 15–45)
NEUTROPHILS # CSF: 13 % — SIGNIFICANT CHANGE UP (ref 0–6)
NRBC # BLD: 0 /100 WBCS — SIGNIFICANT CHANGE UP (ref 0–0)
NRBC NFR CSF: 18 /UL — HIGH (ref 0–5)
PHOSPHATE SERPL-MCNC: 4.1 MG/DL — SIGNIFICANT CHANGE UP (ref 2.5–4.5)
PLATELET # BLD AUTO: 222 K/UL — SIGNIFICANT CHANGE UP (ref 150–400)
POTASSIUM SERPL-MCNC: 3.7 MMOL/L — SIGNIFICANT CHANGE UP (ref 3.5–5.3)
POTASSIUM SERPL-SCNC: 3.7 MMOL/L — SIGNIFICANT CHANGE UP (ref 3.5–5.3)
PROT CSF-MCNC: 62 MG/DL — HIGH (ref 15–45)
RBC # BLD: 4.1 M/UL — SIGNIFICANT CHANGE UP (ref 3.8–5.2)
RBC # CSF: 259 /UL — HIGH (ref 0–0)
RBC # FLD: 12.9 % — SIGNIFICANT CHANGE UP (ref 10.3–14.5)
SODIUM SERPL-SCNC: 139 MMOL/L — SIGNIFICANT CHANGE UP (ref 135–145)
SPECIMEN SOURCE: SIGNIFICANT CHANGE UP
TUBE TYPE: SIGNIFICANT CHANGE UP
WBC # BLD: 4.98 K/UL — SIGNIFICANT CHANGE UP (ref 3.8–10.5)
WBC # FLD AUTO: 4.98 K/UL — SIGNIFICANT CHANGE UP (ref 3.8–10.5)

## 2023-05-22 PROCEDURE — 99291 CRITICAL CARE FIRST HOUR: CPT

## 2023-05-22 RX ORDER — POTASSIUM CHLORIDE 20 MEQ
40 PACKET (EA) ORAL ONCE
Refills: 0 | Status: DISCONTINUED | OUTPATIENT
Start: 2023-05-22 | End: 2023-05-22

## 2023-05-22 RX ORDER — POTASSIUM CHLORIDE 20 MEQ
40 PACKET (EA) ORAL ONCE
Refills: 0 | Status: COMPLETED | OUTPATIENT
Start: 2023-05-22 | End: 2023-05-22

## 2023-05-22 RX ORDER — METHOCARBAMOL 500 MG/1
500 TABLET, FILM COATED ORAL EVERY 12 HOURS
Refills: 0 | Status: DISCONTINUED | OUTPATIENT
Start: 2023-05-22 | End: 2023-05-23

## 2023-05-22 RX ORDER — BENZOCAINE AND MENTHOL 5; 1 G/100ML; G/100ML
1 LIQUID ORAL EVERY 4 HOURS
Refills: 0 | Status: DISCONTINUED | OUTPATIENT
Start: 2023-05-22 | End: 2023-05-24

## 2023-05-22 RX ADMIN — LEVETIRACETAM 500 MILLIGRAM(S): 250 TABLET, FILM COATED ORAL at 18:03

## 2023-05-22 RX ADMIN — TRAMADOL HYDROCHLORIDE 50 MILLIGRAM(S): 50 TABLET ORAL at 05:45

## 2023-05-22 RX ADMIN — TRAMADOL HYDROCHLORIDE 50 MILLIGRAM(S): 50 TABLET ORAL at 17:30

## 2023-05-22 RX ADMIN — ENOXAPARIN SODIUM 40 MILLIGRAM(S): 100 INJECTION SUBCUTANEOUS at 22:10

## 2023-05-22 RX ADMIN — TRAMADOL HYDROCHLORIDE 50 MILLIGRAM(S): 50 TABLET ORAL at 05:07

## 2023-05-22 RX ADMIN — METHOCARBAMOL 500 MILLIGRAM(S): 500 TABLET, FILM COATED ORAL at 22:10

## 2023-05-22 RX ADMIN — Medication 40 MILLIEQUIVALENT(S): at 07:33

## 2023-05-22 RX ADMIN — TRAMADOL HYDROCHLORIDE 50 MILLIGRAM(S): 50 TABLET ORAL at 11:25

## 2023-05-22 RX ADMIN — METHOCARBAMOL 500 MILLIGRAM(S): 500 TABLET, FILM COATED ORAL at 11:23

## 2023-05-22 RX ADMIN — LEVETIRACETAM 500 MILLIGRAM(S): 250 TABLET, FILM COATED ORAL at 05:07

## 2023-05-22 RX ADMIN — POLYETHYLENE GLYCOL 3350 17 GRAM(S): 17 POWDER, FOR SOLUTION ORAL at 05:07

## 2023-05-22 RX ADMIN — TRAMADOL HYDROCHLORIDE 50 MILLIGRAM(S): 50 TABLET ORAL at 22:10

## 2023-05-22 RX ADMIN — TRAMADOL HYDROCHLORIDE 50 MILLIGRAM(S): 50 TABLET ORAL at 23:00

## 2023-05-22 RX ADMIN — TRAMADOL HYDROCHLORIDE 50 MILLIGRAM(S): 50 TABLET ORAL at 17:54

## 2023-05-22 NOTE — PROCEDURE NOTE - GENERAL PROCEDURE DETAILS
subgaleal GRECIA taken off suction. site cleaned. anchoring suture removed. drain pulled, tip visualized. bacitracin/xeroform placed over drain site.
7cc of CSF was aspirated from EVD buretrol in a sterile fashion

## 2023-05-22 NOTE — PROGRESS NOTE ADULT - ASSESSMENT
ASSESSMENT:  63 years old female with left otorrhea admitted from ED with severe left sided headache and intermittent dizzinesss for Left craniotomy for repair of left otorrhea, intra op EVD placement on 05/17/23.    Neuro:   Left craniotomy for repair of left otorrhea, intra op EVD placement on 05/17/23.  - neuro/vitals q1h  - CT haydee complete 5/16, postop CT haydee completed   EVD management: open @5 cmH20, monitor outputs; ICP goal <20  - Pain control prn tramadol; add methocarbamol 500mg q12 for pain during mastication L side since post op    Cardio:  - SBP<140, qtc 431 5/19  - Restart home losartan 50mg if SBP >140 or DBP >80  - Statin held for myalgia     Pulm:   - RA    GI:  - Regular diet   - Bowel regimen    Endo:   a1c 5.3%    Renal:  monitor urine output   check BMP qd     ID:   - ID consulted for empiric meningitis coverage, now monitor off  antibiotics   - s/p vanc/cefepime (5/17-5/19)   - f/u OR cultures (NGTD)  -send CSF cell count and culture for VPS preparation     Heme:  -SCDs for DVT ppx/ SQL     Dispo:   - Pending shunt plan
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
## 2023-05-22 NOTE — PROGRESS NOTE ADULT - SUBJECTIVE AND OBJECTIVE BOX
SUBJECTIVE: Patient reports mild left otorrhea. No visualization and no leakage to collect sample. Denies change in hearing, increased headaches, vision changes.     HOSPITAL COURSE:  5/16: Admitted for OR tomorrow. CT haydee complete. S/p L EVD placement and L temp crani for CSF leak repair w/ ENT.   5/17: POD0 Left craniotomy for repair of left otorrhea, intra op EVD placement on 05/17/23. Empiric meningitis coverage started, f/u ID recs.   5/18: POD1, CARLO overnight. Tramadol started for incisional pain. CT haydee completed today. Flagyl dc'd, cefepime added. Oliveira removed, f/u TOV.  5/19: POD2, carlo overnight. Peripheral IV placed. SG GRECIA d/c. abx d/c per ID. Pt endorsed CP, non-radiating. EKG unremarkable and cardiac enzymes WNL. SQL started.   5/20: POD3, given 500 cc bolus. EVD output WNL. Given 1L bolus.  5/21: POD 4. EVD @ 5. Neuro stable. [place] removed. Patient complaining of clear otorrhea from L ear, none visualized. ENT contacted and reassured mild leakage is normal d/t how ear is packed upon closing, and to notify if worsens.   5/22: POD 5. EVD @ 5. Neuro stable.     Vital Signs Last 24 Hrs  T(C): 37.1 (22 May 2023 00:20), Max: 37.7 (21 May 2023 21:52)  T(F): 98.7 (22 May 2023 00:20), Max: 99.8 (21 May 2023 21:52)  HR: 54 (22 May 2023 00:00) (48 - 65)  BP: 117/58 (22 May 2023 00:00) (108/55 - 126/62)  BP(mean): 84 (22 May 2023 00:00) (75 - 88)  RR: 11 (22 May 2023 00:00) (11 - 25)  SpO2: 99% (22 May 2023 00:00) (91% - 100%)    Parameters below as of 22 May 2023 00:00  Patient On (Oxygen Delivery Method): room air    I&O's Summary    20 May 2023 07:01  -  21 May 2023 07:00  --------------------------------------------------------  IN: 1700 mL / OUT: 3237 mL / NET: -1537 mL    21 May 2023 07:01  -  22 May 2023 00:23  --------------------------------------------------------  IN: 240 mL / OUT: 1749 mL / NET: -1509 mL    PHYSICAL EXAM:  General: patient seen laying supine in bed in NAD on RA  Neuro: AAOx3, FC, OE spontaneously, speech clear and fluent, face symmetric, no pronator drift, strength 5/5 b/l UE and LE, sensation grossly intact to light touch throughout  HEENT: PERRL, EOMI, no visualized otorrhea   Pulmonary: chest rise symmetric  Abdomen: soft, nontender, nondistended  Ext: perfusing well  Skin: warm, dry  Wound: Crani incision site w/ baci and xeroform + staples     LABS:                        10.9   6.38  )-----------( 190      ( 21 May 2023 05:02 )             33.4     05-21    138  |  102  |  7   ----------------------------<  97  4.0   |  28  |  0.54    Ca    8.3<L>      21 May 2023 05:02  Phos  3.8     05-21  Mg     2.0     05-21    CAPILLARY BLOOD GLUCOSE    Drug Levels: [] N/A  Vancomycin Level, Trough: 9.1 ug/mL (05-19 @ 12:34)  Vancomycin Level, Trough: 6.5 ug/mL (05-18 @ 13:23)    CSF Analysis: [] N/A    Allergies    penicillins (Anaphylaxis)  dairy products (Angioedema)    Intolerances    MEDICATIONS:  Antibiotics:    Neuro:  acetaminophen     Tablet .. 650 milliGRAM(s) Oral every 6 hours PRN  levETIRAcetam 500 milliGRAM(s) Oral two times a day  metoclopramide Injectable 10 milliGRAM(s) IV Push every 8 hours PRN  traMADol 50 milliGRAM(s) Oral every 4 hours PRN  traMADol 25 milliGRAM(s) Oral every 4 hours PRN    Anticoagulation:  enoxaparin Injectable 40 milliGRAM(s) SubCutaneous every 24 hours    OTHER:  bisacodyl 5 milliGRAM(s) Oral daily PRN  chlorhexidine 2% Cloths 1 Application(s) Topical daily  polyethylene glycol 3350 17 Gram(s) Oral two times a day  senna 2 Tablet(s) Oral at bedtime    IVF:    CULTURES:  Culture Results:   No growth to date (05-17 @ 19:30)  Culture Results:   No growth to date (05-17 @ 16:17)    RADIOLOGY & ADDITIONAL TESTS:      ASSESSMENT:  63 years old female with left otorrhea admitted from ED with severe left sided headache and intermittent dizzinesss for Left craniotomy for repair of left otorrhea, intra op EVD placement on 05/17/23.    Neuro:  - neuro/vitals q1h  - CT haydee complete 5/16, postop CT haydee completed  - EVD open @5 cmH20, monitor outputs  - Pain control prn tramadol     Cardio:  - SBP<140, qtc 431 5/19  - Restart home losartan 50 if needed  - Statin held for myalgia     Pulm:   - RA    GI:  - Regular diet   - Bowel regimen    Endo:   - ISS dced     Renal:  - voiding    ID:   - ID consulted for empiric meningitis coverage, now off antibiotics   - s/p vanc/cefepime (5/17-5/19)   - f/u OR cultures    Heme:  -SCDs for DVT ppx/ SQL     Dispo:   - Pending shunt plan     Discussed w/ Dr. Ramirez and Dr. Beaver

## 2023-05-22 NOTE — CHART NOTE - NSCHARTNOTEFT_GEN_A_CORE
EVD remains open @ 5. Plan for VPS Wednesday. Neuro exam stable. Robaxin started for left jaw muscle spasm.

## 2023-05-22 NOTE — PROGRESS NOTE ADULT - ASSESSMENT
Assessment and Plan:    MARIA FERNANDA VILLARREAL is a 63 years old female with left otorrhea admitted from ED with severe left sided headache and intermittent dizziness for Left craniotomy for repair of left otorrhea, intra op EVD placement on 05/17/23.    Plan:  Neuro:  - Pain control   - F/u csf cultures   - Incision care as per NSGY   - ENT continue to follow     [facility] at [phone] with any questions/concerns.

## 2023-05-22 NOTE — PROGRESS NOTE ADULT - SUBJECTIVE AND OBJECTIVE BOX
HPI:  HPI: 64 y/o female with PMH of HTN, HLD. She presented to ER with complaint of severe left side head/face pain, intermittent dizziness, also intermittent left CSF otorrhea. She has hx of left cholesteatoma, chronic left mastoiditis  She had an eustachian ventilating tube placed in 2020 (?) which was removed about 1 month ago. She admits that since removal of  tube she is having CSF leakage from left ear and pressure like left side headache.  She states that today she had severe left side headache and left facial like pain and decided to come to ER. She requested to  speak  with Dr. Ramirez who is following her for CSF leak.   CT head is negative for acute pathology; no hemorrhage, no stroke, no hydrocephalus. (16 May 2023 16:15)    On admission, the patient was:  GCS: 15  Hunt-Deleon:  modified Ellis:  ICH score:  NIHSS:      ICU Vital Signs Last 24 Hrs  T(C): 37.6 (22 May 2023 17:48), Max: 37.7 (21 May 2023 21:52)  T(F): 99.7 (22 May 2023 17:48), Max: 99.8 (21 May 2023 21:52)  HR: 64 (22 May 2023 18:05) (51 - 72)  BP: 107/56 (22 May 2023 18:05) (107/56 - 132/63)  BP(mean): 74 (22 May 2023 18:05) (74 - 91)  ABP: 108/69 (21 May 2023 20:00) (108/69 - 108/69)  ABP(mean): 85 (21 May 2023 20:00) (85 - 85)  RR: 25 (22 May 2023 18:05) (11 - 29)  SpO2: 94% (22 May 2023 18:05) (91% - 100%)      05-21-23 @ 07:01  -  05-22-23 @ 07:00  --------------------------------------------------------  IN: 880 mL / OUT: 2118 mL / NET: -1238 mL    05-22-23 @ 07:01  -  05-22-23 @ 19:22  --------------------------------------------------------  IN: 300 mL / OUT: 1154 mL / NET: -854 mL      EXAMINATION:  General: Calm  HEENT:  MMM  Neuro: Awake, alert, oriented x 3  Pupils 3mm and reactive, EOMI  Follows commands, moves all extremities 5/5 strength, sensation intact  Cards: S1/S2, RRR  Respiratory: Clear, no wheeze  Abdomen: Soft, non- tender  Extremities:  No edema  Skin:  Warm/dry      MEDICATIONS:   acetaminophen     Tablet .. 650 milliGRAM(s) Oral every 6 hours PRN  benzocaine/menthol Lozenge 1 Lozenge Oral every 4 hours PRN  bisacodyl 5 milliGRAM(s) Oral daily PRN  chlorhexidine 2% Cloths 1 Application(s) Topical daily  enoxaparin Injectable 40 milliGRAM(s) SubCutaneous every 24 hours  levETIRAcetam 500 milliGRAM(s) Oral two times a day  methocarbamol 500 milliGRAM(s) Oral every 12 hours  metoclopramide Injectable 10 milliGRAM(s) IV Push every 8 hours PRN  polyethylene glycol 3350 17 Gram(s) Oral two times a day  senna 2 Tablet(s) Oral at bedtime  traMADol 50 milliGRAM(s) Oral every 4 hours PRN  traMADol 25 milliGRAM(s) Oral every 4 hours PRN    LABS:                      11.6   4.98  )-----------( 222      ( 22 May 2023 05:30 )             36.5     05-22    139  |  100  |  6<L>  ----------------------------<  101<H>  3.7   |  30  |  0.50    Ca    8.4      22 May 2023 05:30  Phos  4.1     05-22  Mg     2.1     05-22        Culture - CSF with Gram Stain (collected 05-22-23 @ 07:27)  Source: .CSF CSF  Gram Stain (05-22-23 @ 10:34):    No organisms seen    Rare WBC's      ASSESSMENT  POD 5 s/p L temp crani for CSF leak repair w/ ENT. L EVD placement*      PLAN:   Q1 neurochecks  Analgesia prn; added robaxin  EVD open @5cmH20. Monitor ICPs/output/color. Pending VPS.   Incentive spirometry. Keep sats >92%  SBP goal 100-140  On losartan at home. Resume if needed. Bowel regimen [] colace [x] senna [] other:  LBM: Awaiting  Goal euglycemia (-180)  VTE prophylaxis: [x] SCDs [x] chemoprophylaxis   Afebrile. Monitor for signs and symptoms of infection    Additional 45 minutes of critical care time     NSCU ATTENDING -- ADDITIONAL PROGRESS NOTE    Nighttime rounds were performed -- please refer to earlier Progress Note for HPI details.      HPI: 62 y/o female with PMH of HTN, HLD. She presented to ER with complaint of severe left side head/face pain, intermittent dizziness, also intermittent left CSF otorrhea. She has hx of left cholesteatoma, chronic left mastoiditis  She had an eustachian ventilating tube placed in 2020 (?) which was removed about 1 month ago. She admits that since removal of  tube she is having CSF leakage from left ear and pressure like left side headache.  She states that today she had severe left side headache and left facial like pain and decided to come to ER. She requested to  speak  with Dr. Ramirez who is following her for CSF leak.   CT head is negative for acute pathology; no hemorrhage, no stroke, no hydrocephalus. (16 May 2023 16:15)    On admission, the patient was:  GCS: 15  Hunt-Deleon:  modified Ellis:  ICH score:  NIHSS:      ICU Vital Signs Last 24 Hrs  T(C): 37.6 (22 May 2023 17:48), Max: 37.7 (21 May 2023 21:52)  T(F): 99.7 (22 May 2023 17:48), Max: 99.8 (21 May 2023 21:52)  HR: 64 (22 May 2023 18:05) (51 - 72)  BP: 107/56 (22 May 2023 18:05) (107/56 - 132/63)  BP(mean): 74 (22 May 2023 18:05) (74 - 91)  ABP: 108/69 (21 May 2023 20:00) (108/69 - 108/69)  ABP(mean): 85 (21 May 2023 20:00) (85 - 85)  RR: 25 (22 May 2023 18:05) (11 - 29)  SpO2: 94% (22 May 2023 18:05) (91% - 100%)      05-21-23 @ 07:01  -  05-22-23 @ 07:00  --------------------------------------------------------  IN: 880 mL / OUT: 2118 mL / NET: -1238 mL    05-22-23 @ 07:01  -  05-22-23 @ 19:22  --------------------------------------------------------  IN: 300 mL / OUT: 1154 mL / NET: -854 mL      EXAMINATION:  General: Calm  HEENT:  MMM  Neuro: Awake, alert, oriented x 3  Pupils 3mm and reactive, EOMI  Follows commands, moves all extremities 5/5 strength, sensation intact  Cards: S1/S2, RRR  Respiratory: Clear, no wheeze  Abdomen: Soft, non- tender  Extremities:  No edema  Skin:  Warm/dry      MEDICATIONS:   acetaminophen     Tablet .. 650 milliGRAM(s) Oral every 6 hours PRN  benzocaine/menthol Lozenge 1 Lozenge Oral every 4 hours PRN  bisacodyl 5 milliGRAM(s) Oral daily PRN  chlorhexidine 2% Cloths 1 Application(s) Topical daily  enoxaparin Injectable 40 milliGRAM(s) SubCutaneous every 24 hours  levETIRAcetam 500 milliGRAM(s) Oral two times a day  methocarbamol 500 milliGRAM(s) Oral every 12 hours  metoclopramide Injectable 10 milliGRAM(s) IV Push every 8 hours PRN  polyethylene glycol 3350 17 Gram(s) Oral two times a day  senna 2 Tablet(s) Oral at bedtime  traMADol 50 milliGRAM(s) Oral every 4 hours PRN  traMADol 25 milliGRAM(s) Oral every 4 hours PRN    LABS:                      11.6   4.98  )-----------( 222      ( 22 May 2023 05:30 )             36.5     05-22    139  |  100  |  6<L>  ----------------------------<  101<H>  3.7   |  30  |  0.50    Ca    8.4      22 May 2023 05:30  Phos  4.1     05-22  Mg     2.1     05-22        Culture - CSF with Gram Stain (collected 05-22-23 @ 07:27)  Source: .CSF CSF  Gram Stain (05-22-23 @ 10:34):    No organisms seen    Rare WBC's      ASSESSMENT  POD 5 s/p L temp crani for CSF leak repair w/ ENT. L EVD placement      PLAN:   Q1 neurochecks  Analgesia prn; added robaxin  EVD open @5cmH20. Monitor ICPs/output/color. Pending VPS 4/24  Incentive spirometry. Keep sats >92%  SBP goal 100-140  On losartan at home. Resume if needed.   Bowel regimen [] colace [x] senna [] other:  LBM: 5/22  Goal euglycemia (-180)  VTE prophylaxis: [x] SCDs [x] chemoprophylaxis   Afebrile. Monitor for signs and symptoms of infection    Additional 45 minutes of critical care time

## 2023-05-22 NOTE — PROGRESS NOTE ADULT - SUBJECTIVE AND OBJECTIVE BOX
OTOLARYNGOLOGY (ENT) PROGRESS NOTE    PATIENT: MARIA FERNANDA VILLARREAL  MRN: [id number]  : 62  [id number]  DATE OF SERVICE:  23  			           ID:MARIA FERNANDA VILLARREAL is a  63yFemale S/P L EVD placement and L temp crani for CSF leak repair w/ ENT    Subjective/ Interval:   No issues overnight, pain minimal, mastoid soft head compression dressing in place, EVD open at 5cc  ; patient seen bedside, dressings in place, EVD clamped until 7  : NAEON. Patient tmax 100.5, otherwise afebrile and vitals stable. Pain well controlled. Denies headache, nausea/vomiting, or dizziness. GRECIA removed. EVD open at 5. Cultures NGTD.  : NAEON. AFVSS. Pain well controlled. Denies headache, nausea/vomiting, or dizziness. EVD open at 5. Cultures NGTD.  : NAEON. AFVSS. Pain well controlled. She reports some tenderness over incision site. EVD remains in place. Cultures remain NGTD.    ALLERGIES:  penicillins (Anaphylaxis)  dairy products (Angioedema)    MEDICATIONS  (STANDING):  chlorhexidine 2% Cloths 1 Application(s) Topical daily  enoxaparin Injectable 40 milliGRAM(s) SubCutaneous every 24 hours  levETIRAcetam 500 milliGRAM(s) Oral two times a day  polyethylene glycol 3350 17 Gram(s) Oral two times a day  potassium chloride    Tablet ER 40 milliEquivalent(s) Oral once  senna 2 Tablet(s) Oral at bedtime    MEDICATIONS  (PRN):  acetaminophen     Tablet .. 650 milliGRAM(s) Oral every 6 hours PRN Temp greater or equal to 38C (100.4F), Mild Pain (1 - 3)  bisacodyl 5 milliGRAM(s) Oral daily PRN Constipation  metoclopramide Injectable 10 milliGRAM(s) IV Push every 8 hours PRN nausea/vomiting 2nd line  traMADol 25 milliGRAM(s) Oral every 4 hours PRN Moderate Pain (4 - 6)  traMADol 50 milliGRAM(s) Oral every 4 hours PRN Severe Pain (7 - 10)    ICU Vital Signs Last 24 Hrs  T(C): 36.5 (22 May 2023 05:35), Max: 37.7 (21 May 2023 21:52)  T(F): 97.7 (22 May 2023 05:35), Max: 99.8 (21 May 2023 21:52)  HR: 53 (22 May 2023 06:00) (48 - 65)  BP: 131/62 (22 May 2023 06:00) (108/55 - 132/63)  BP(mean): 89 (22 May 2023 06:00) (75 - 90)  ABP: 108/69 (21 May 2023 20:00) (100/68 - 146/73)  ABP(mean): 85 (21 May 2023 20:00) (64 - 98)  RR: 12 (22 May 2023 06:00) (11 - 25)  SpO2: 97% (22 May 2023 06:00) (91% - 100%)    O2 Parameters below as of 22 May 2023 06:00  Patient On (Oxygen Delivery Method): nasal cannula  O2 Flow (L/min): 2        I&O's Detail    21 May 2023 07:01  -  22 May 2023 07:00  --------------------------------------------------------  IN:    Oral Fluid: 240 mL  Total IN: 240 mL    OUT:    External Ventricular Device (mL): 213 mL    Voided (mL): 1900 mL  Total OUT: 2113 mL    Total NET: -1873 mL                    General: patient seen laying supine in bed in NAD  Neuro: AAOx3, speech clear and fluent, EVD in place  HEENT: EOMI, mastoid soft, incision c/d/i, small edema around superior incision  Pulmonary: regular respirations, non labored   Ext: perfusing well  Skin: warm, dry    .  LABS:                         11.6   4.98  )-----------( 222      ( 22 May 2023 05:30 )             36.5     05-    139  |  100  |  6<L>  ----------------------------<  101<H>  3.7   |  30  |  0.50    Ca    8.4      22 May 2023 05:30  Phos  4.1     -  Mg     2.1     -                    RADIOLOGY, EKG & ADDITIONAL TESTS: Reviewed.

## 2023-05-23 ENCOUNTER — TRANSCRIPTION ENCOUNTER (OUTPATIENT)
Age: 63
End: 2023-05-23

## 2023-05-23 LAB
ANION GAP SERPL CALC-SCNC: 8 MMOL/L — SIGNIFICANT CHANGE UP (ref 5–17)
APPEARANCE CSF: CLEAR — SIGNIFICANT CHANGE UP
APPEARANCE SPUN FLD: ABNORMAL
APTT BLD: 32.6 SEC — SIGNIFICANT CHANGE UP (ref 27.5–35.5)
BUN SERPL-MCNC: 8 MG/DL — SIGNIFICANT CHANGE UP (ref 7–23)
CALCIUM SERPL-MCNC: 8.9 MG/DL — SIGNIFICANT CHANGE UP (ref 8.4–10.5)
CHLORIDE SERPL-SCNC: 102 MMOL/L — SIGNIFICANT CHANGE UP (ref 96–108)
CO2 SERPL-SCNC: 27 MMOL/L — SIGNIFICANT CHANGE UP (ref 22–31)
COLOR CSF: ABNORMAL
CREAT SERPL-MCNC: 0.52 MG/DL — SIGNIFICANT CHANGE UP (ref 0.5–1.3)
CSF COMMENTS: SIGNIFICANT CHANGE UP
CSF PCR RESULT: SIGNIFICANT CHANGE UP
EGFR: 105 ML/MIN/1.73M2 — SIGNIFICANT CHANGE UP
GLUCOSE CSF-MCNC: 49 MG/DL — SIGNIFICANT CHANGE UP (ref 40–70)
GLUCOSE SERPL-MCNC: 105 MG/DL — HIGH (ref 70–99)
GRAM STN FLD: SIGNIFICANT CHANGE UP
HCT VFR BLD CALC: 35.9 % — SIGNIFICANT CHANGE UP (ref 34.5–45)
HGB BLD-MCNC: 11.6 G/DL — SIGNIFICANT CHANGE UP (ref 11.5–15.5)
INR BLD: 1.06 — SIGNIFICANT CHANGE UP (ref 0.88–1.16)
MAGNESIUM SERPL-MCNC: 2.1 MG/DL — SIGNIFICANT CHANGE UP (ref 1.6–2.6)
MCHC RBC-ENTMCNC: 28.6 PG — SIGNIFICANT CHANGE UP (ref 27–34)
MCHC RBC-ENTMCNC: 32.3 GM/DL — SIGNIFICANT CHANGE UP (ref 32–36)
MCV RBC AUTO: 88.4 FL — SIGNIFICANT CHANGE UP (ref 80–100)
NEUTROPHILS # CSF: 1 % — SIGNIFICANT CHANGE UP (ref 0–6)
NRBC # BLD: 0 /100 WBCS — SIGNIFICANT CHANGE UP (ref 0–0)
NRBC NFR CSF: 1 /UL — SIGNIFICANT CHANGE UP (ref 0–5)
PHOSPHATE SERPL-MCNC: 4.2 MG/DL — SIGNIFICANT CHANGE UP (ref 2.5–4.5)
PLATELET # BLD AUTO: 215 K/UL — SIGNIFICANT CHANGE UP (ref 150–400)
POTASSIUM SERPL-MCNC: 3.6 MMOL/L — SIGNIFICANT CHANGE UP (ref 3.5–5.3)
POTASSIUM SERPL-SCNC: 3.6 MMOL/L — SIGNIFICANT CHANGE UP (ref 3.5–5.3)
PROT CSF-MCNC: 74 MG/DL — HIGH (ref 15–45)
PROTHROM AB SERPL-ACNC: 12.6 SEC — SIGNIFICANT CHANGE UP (ref 10.5–13.4)
RBC # BLD: 4.06 M/UL — SIGNIFICANT CHANGE UP (ref 3.8–5.2)
RBC # CSF: 16 /UL — HIGH (ref 0–0)
RBC # FLD: 13 % — SIGNIFICANT CHANGE UP (ref 10.3–14.5)
SODIUM SERPL-SCNC: 137 MMOL/L — SIGNIFICANT CHANGE UP (ref 135–145)
SPECIMEN SOURCE: SIGNIFICANT CHANGE UP
TUBE TYPE: SIGNIFICANT CHANGE UP
WBC # BLD: 5.56 K/UL — SIGNIFICANT CHANGE UP (ref 3.8–10.5)
WBC # FLD AUTO: 5.56 K/UL — SIGNIFICANT CHANGE UP (ref 3.8–10.5)

## 2023-05-23 PROCEDURE — 99291 CRITICAL CARE FIRST HOUR: CPT

## 2023-05-23 RX ORDER — OFLOXACIN OTIC SOLUTION 3 MG/ML
4 SOLUTION/ DROPS AURICULAR (OTIC)
Refills: 0 | Status: DISCONTINUED | OUTPATIENT
Start: 2023-05-23 | End: 2023-05-24

## 2023-05-23 RX ORDER — POVIDONE-IODINE 5 %
1 AEROSOL (ML) TOPICAL ONCE
Refills: 0 | Status: COMPLETED | OUTPATIENT
Start: 2023-05-24 | End: 2023-05-24

## 2023-05-23 RX ORDER — POTASSIUM CHLORIDE 20 MEQ
40 PACKET (EA) ORAL ONCE
Refills: 0 | Status: COMPLETED | OUTPATIENT
Start: 2023-05-23 | End: 2023-05-23

## 2023-05-23 RX ORDER — CHLORHEXIDINE GLUCONATE 213 G/1000ML
1 SOLUTION TOPICAL ONCE
Refills: 0 | Status: COMPLETED | OUTPATIENT
Start: 2023-05-24 | End: 2023-05-24

## 2023-05-23 RX ORDER — METHOCARBAMOL 500 MG/1
500 TABLET, FILM COATED ORAL EVERY 8 HOURS
Refills: 0 | Status: DISCONTINUED | OUTPATIENT
Start: 2023-05-23 | End: 2023-05-24

## 2023-05-23 RX ORDER — SODIUM CHLORIDE 9 MG/ML
1000 INJECTION INTRAMUSCULAR; INTRAVENOUS; SUBCUTANEOUS
Refills: 0 | Status: DISCONTINUED | OUTPATIENT
Start: 2023-05-23 | End: 2023-05-24

## 2023-05-23 RX ADMIN — TRAMADOL HYDROCHLORIDE 50 MILLIGRAM(S): 50 TABLET ORAL at 14:52

## 2023-05-23 RX ADMIN — CHLORHEXIDINE GLUCONATE 1 APPLICATION(S): 213 SOLUTION TOPICAL at 06:14

## 2023-05-23 RX ADMIN — POLYETHYLENE GLYCOL 3350 17 GRAM(S): 17 POWDER, FOR SOLUTION ORAL at 18:44

## 2023-05-23 RX ADMIN — TRAMADOL HYDROCHLORIDE 50 MILLIGRAM(S): 50 TABLET ORAL at 22:00

## 2023-05-23 RX ADMIN — TRAMADOL HYDROCHLORIDE 50 MILLIGRAM(S): 50 TABLET ORAL at 07:00

## 2023-05-23 RX ADMIN — METHOCARBAMOL 500 MILLIGRAM(S): 500 TABLET, FILM COATED ORAL at 10:29

## 2023-05-23 RX ADMIN — OFLOXACIN OTIC SOLUTION 4 DROP(S): 3 SOLUTION/ DROPS AURICULAR (OTIC) at 23:36

## 2023-05-23 RX ADMIN — TRAMADOL HYDROCHLORIDE 50 MILLIGRAM(S): 50 TABLET ORAL at 22:30

## 2023-05-23 RX ADMIN — LEVETIRACETAM 500 MILLIGRAM(S): 250 TABLET, FILM COATED ORAL at 06:10

## 2023-05-23 RX ADMIN — CHLORHEXIDINE GLUCONATE 1 APPLICATION(S): 213 SOLUTION TOPICAL at 11:46

## 2023-05-23 RX ADMIN — METHOCARBAMOL 500 MILLIGRAM(S): 500 TABLET, FILM COATED ORAL at 18:44

## 2023-05-23 RX ADMIN — OFLOXACIN OTIC SOLUTION 4 DROP(S): 3 SOLUTION/ DROPS AURICULAR (OTIC) at 14:24

## 2023-05-23 RX ADMIN — TRAMADOL HYDROCHLORIDE 50 MILLIGRAM(S): 50 TABLET ORAL at 13:50

## 2023-05-23 RX ADMIN — OFLOXACIN OTIC SOLUTION 4 DROP(S): 3 SOLUTION/ DROPS AURICULAR (OTIC) at 18:44

## 2023-05-23 RX ADMIN — LEVETIRACETAM 500 MILLIGRAM(S): 250 TABLET, FILM COATED ORAL at 18:43

## 2023-05-23 RX ADMIN — Medication 40 MILLIEQUIVALENT(S): at 06:55

## 2023-05-23 RX ADMIN — TRAMADOL HYDROCHLORIDE 50 MILLIGRAM(S): 50 TABLET ORAL at 06:10

## 2023-05-23 NOTE — PROGRESS NOTE ADULT - SUBJECTIVE AND OBJECTIVE BOX
INTERVAL HISTORY: HPI:  HPI: 63  female comes to ER with complaint of severe left side head/face pain, intermittent dizziness, also intermittent left CSF otorrhea. She has hx of left cholesteatoma, chronic left mastoiditis  She had an eustachian ventilating tube placed in 2020 (?) which was removed about 1 month ago. She admits that since removal of  tube she is having CSF leakage from left ear and pressure like left side headache.  She states that today she had severe left side headache and left facial like pain and decided to come to ER. She requested to  speak  with Dr. Ramirez who is following her for CSF leak.   CT head is negative for acute pathology; no hemorrhage, no stroke, no hydrocephalus. (16 May 2023 16:15)      Drug Dosing Weight  Height (cm): 157.5 (17 May 2023 12:26)  Weight (kg): 83.2 (17 May 2023 12:26)  BMI (kg/m2): 33.5 (17 May 2023 12:26)  BSA (m2): 1.84 (17 May 2023 12:26)    PAST MEDICAL & SURGICAL HISTORY:  Chronic serous otitis media  Hypertension  Hypercholesteremia  Obesity  Otorrhea of left ear  Trauma  cut with glass - right ring finger  CSF otorrhea  HLD (hyperlipidemia)  H/O myringotomy  s/p excision of osteoma, left myringotomy tube (2006) again in 2018  H/O nasal polypectomy  2013  History of appendectomy  1976        REVIEW OF SYSTEMS: [ ] Unable to Assess due to neurologic exam   [ ] All ROS addressed below are non-contributory, except:  Neuro: [ ] Headache [ ] Back pain [ ] Numbness [ ] Weakness [ ] Ataxia [ ] Dizziness [ ] Aphasia [ ] Dysarthria [ ] Visual disturbance  Resp: [ ] Shortness of breath/dyspnea, [ ] Orthopnea [ ] Cough  CV: [ ] Chest pain [ ] Palpitation [ ] Lightheadedness [ ] Syncope  Renal: [ ] Thirst [ ] Edema  GI: [ ] Nausea [ ] Emesis [ ] Abdominal pain [ ] Constipation [ ] Diarrhea  Hem: [ ] Hematemesis [ ] bright red blood per rectum  ID: [ ] Fever [ ] Chills [ ] Dysuria  ENT: [ ] Rhinorrhea    PHYSICAL EXAM:    General: No Acute Distress   Neurological: Awake, alert oriented to person, place and time, Following Commands, PERRL, EOMI, Face Symmetrical, Speech Fluent, Moving all extremities, Muscle Strength normal in all four extremities, No Drift, Sensation to Light Touch Intact  Pulmonary: Clear to Auscultation, No Rales, No Rhonchi, No Wheezes   Cardiovascular: S1, S2, Regular Rate and Rhythm   Gastrointestinal: Soft, Nontender, Nondistended   Extremities: No calf tenderness   Incision: CDI EVD in place - patent     ICU Vital Signs Last 24 Hrs  T(C): 37.1 (22 May 2023 09:10), Max: 37.7 (21 May 2023 21:52)  T(F): 98.8 (22 May 2023 09:10), Max: 99.8 (21 May 2023 21:52)  HR: 56 (22 May 2023 13:00) (48 - 72)  BP: 132/61 (22 May 2023 13:00) (108/55 - 132/63)  BP(mean): 87 (22 May 2023 13:00) (75 - 91)  ABP: 108/69 (21 May 2023 20:00) (100/68 - 123/56)  ABP(mean): 85 (21 May 2023 20:00) (70 - 86)  RR: 14 (22 May 2023 13:00) (11 - 29)  SpO2: 93% (22 May 2023 13:00) (92% - 100%)    O2 Parameters below as of 22 May 2023 13:00  Patient On (Oxygen Delivery Method): room air            I&O's Detail    21 May 2023 07:01  -  22 May 2023 07:00  --------------------------------------------------------  IN:    Oral Fluid: 880 mL  Total IN: 880 mL    OUT:    External Ventricular Device (mL): 218 mL    Voided (mL): 1900 mL  Total OUT: 2118 mL    Total NET: -1238 mL      22 May 2023 07:01  -  22 May 2023 14:02  --------------------------------------------------------  IN:    Oral Fluid: 300 mL  Total IN: 300 mL    OUT:    External Ventricular Device (mL): 41 mL    Voided (mL): 200 mL  Total OUT: 241 mL    Total NET: 59 mL              LABS:  CBC Full  -  ( 22 May 2023 05:30 )  WBC Count : 4.98 K/uL  RBC Count : 4.10 M/uL  Hemoglobin : 11.6 g/dL  Hematocrit : 36.5 %  Platelet Count - Automated : 222 K/uL  Mean Cell Volume : 89.0 fl  Mean Cell Hemoglobin : 28.3 pg  Mean Cell Hemoglobin Concentration : 31.8 gm/dL  Auto Neutrophil # : x  Auto Lymphocyte # : x  Auto Monocyte # : x  Auto Eosinophil # : x  Auto Basophil # : x  Auto Neutrophil % : x  Auto Lymphocyte % : x  Auto Monocyte % : x  Auto Eosinophil % : x  Auto Basophil % : x    05-22    139  |  100  |  6<L>  ----------------------------<  101<H>  3.7   |  30  |  0.50    Ca    8.4      22 May 2023 05:30  Phos  4.1     05-22  Mg     2.1     05-22            RADIOLOGY & ADDITIONAL STUDIES: reviewed

## 2023-05-23 NOTE — PROGRESS NOTE ADULT - ASSESSMENT
ASSESSMENT:  63 years old female with left otorrhea admitted from ED with severe left sided headache and intermittent dizziness for Left craniotomy for repair of left otorrhea, intra op EVD placement on 05/17/23.    Neuro:   Left craniotomy for repair of left otorrhea, intra op EVD placement on 05/17/23.  - neuro/vitals q1h  - CT haydee complete 5/16, postop CT haydee completed   EVD management: open @5 cmH20, monitor outputs; ICP goal <20  - Pain control prn tramadol; c/w methocarbamol 500mg q12 for pain during mastication L side since post op    Cardio:  - SBP<140, qtc 431 5/19  - Restart home losartan 50mg if SBP >140 or DBP >80  - Statin held for myalgia     Pulm:   - RA    GI:  - Regular diet   - Bowel regimen    Endo:   a1c 5.3%    Renal:  monitor urine output   check BMP qd     ID:   - ID consulted for empiric meningitis coverage, now monitor off  antibiotics   - s/p vanc/cefepime (5/17-5/19)   - f/u OR cultures (NGTD)  -send CSF cell count and culture for VPS preparation     Heme:  -SCDs for DVT ppx hold SQ lovenox for OR tomorrow     Dispo:   - Pending shunt plan tomorrow

## 2023-05-23 NOTE — PROGRESS NOTE ADULT - SUBJECTIVE AND OBJECTIVE BOX
OTOLARYNGOLOGY (ENT) PROGRESS NOTE    PATIENT: MARIA FERNANDA VILLARREAL  MRN: [id number]  : 62  [id number]  DATE OF SERVICE:  23  			           ID:MARIA FERNANDA VILLARREAL is a  63yFemale S/P L EVD placement and L temp crani for CSF leak repair w/ ENT    Subjective/ Interval:   No issues overnight, pain minimal, mastoid soft head compression dressing in place, EVD open at 5cc  ; patient seen bedside, dressings in place, EVD clamped until 7  : NAEON. Patient tmax 100.5, otherwise afebrile and vitals stable. Pain well controlled. Denies headache, nausea/vomiting, or dizziness. GRECIA removed. EVD open at 5. Cultures NGTD.  : NAEON. AFVSS. Pain well controlled. Denies headache, nausea/vomiting, or dizziness. EVD open at 5. Cultures NGTD.  : NAEON. AFVSS. Pain well controlled. She reports some tenderness over incision site. EVD remains in place. Cultures remain NGTD.  : NAEON. AFVSS. Pain well controlled. EVD remains in place. She required oxygen overnight while sleeping, which was administered with nasal cannula. Final OR and CSF cultures negative.    ALLERGIES:  penicillins (Anaphylaxis)  dairy products (Angioedema)    MEDICATIONS  (STANDING):  chlorhexidine 2% Cloths 1 Application(s) Topical daily  levETIRAcetam 500 milliGRAM(s) Oral two times a day  methocarbamol 500 milliGRAM(s) Oral every 12 hours  polyethylene glycol 3350 17 Gram(s) Oral two times a day  senna 2 Tablet(s) Oral at bedtime  sodium chloride 0.9%. 1000 milliLiter(s) (80 mL/Hr) IV Continuous <Continuous>    MEDICATIONS  (PRN):  acetaminophen     Tablet .. 650 milliGRAM(s) Oral every 6 hours PRN Temp greater or equal to 38C (100.4F), Mild Pain (1 - 3)  benzocaine/menthol Lozenge 1 Lozenge Oral every 4 hours PRN Sore Throat  bisacodyl 5 milliGRAM(s) Oral daily PRN Constipation  metoclopramide Injectable 10 milliGRAM(s) IV Push every 8 hours PRN nausea/vomiting 2nd line  traMADol 50 milliGRAM(s) Oral every 4 hours PRN Severe Pain (7 - 10)  traMADol 25 milliGRAM(s) Oral every 4 hours PRN Moderate Pain (4 - 6)      ICU Vital Signs Last 24 Hrs  T(C): 36.8 (23 May 2023 05:48), Max: 37.6 (22 May 2023 17:48)  T(F): 98.3 (23 May 2023 05:48), Max: 99.7 (22 May 2023 17:48)  HR: 52 (23 May 2023 07:00) (51 - 72)  BP: 124/60 (23 May 2023 06:00) (100/52 - 132/61)  BP(mean): 87 (23 May 2023 06:00) (74 - 87)  ABP: --  ABP(mean): --  RR: 12 (23 May 2023 07:00) (12 - 29)  SpO2: 92% (23 May 2023 07:00) (91% - 99%)    O2 Parameters below as of 23 May 2023 07:00  Patient On (Oxygen Delivery Method): room air        I&O's Detail    22 May 2023 07:01  -  23 May 2023 07:00  --------------------------------------------------------  IN:    Oral Fluid: 650 mL  Total IN: 650 mL    OUT:    External Ventricular Device (mL): 165 mL    Voided (mL): 1550 mL  Total OUT: 1715 mL    Total NET: -1065 mL                    General: patient seen laying supine in bed in NAD  Neuro: AAOx3, speech clear and fluent, EVD in place  HEENT: EOMI, mastoid soft, incision c/d/i, small edema around superior incision  Pulmonary: regular respirations, non labored, on nasal cannula, removed on rounds this morning   Ext: perfusing well  Skin: warm, dry        LABS:                         11.6   5.56  )-----------( 215      ( 23 May 2023 05:35 )             35.9     05    137  |  102  |  8   ----------------------------<  105<H>  3.6   |  27  |  0.52    Ca    8.9      23 May 2023 05:35  Phos  4.2       Mg     2.1           PT/INR - ( 23 May 2023 05:35 )   PT: 12.6 sec;   INR: 1.06          PTT - ( 23 May 2023 05:35 )  PTT:32.6 sec

## 2023-05-23 NOTE — PROGRESS NOTE ADULT - ASSESSMENT
Assessment and Plan:    MARIA FERNANDA VILLARREAL is a 63 years old female with left otorrhea admitted from ED with severe left sided headache and intermittent dizziness for Left craniotomy for repair of left otorrhea, intra op EVD placement on 05/17/23.    Plan:  Neuro:  - Pain control   - Incision care as per NSGY   - if she requires oxygen, please avoid nasal cannula if possible as places pressure on post-auricular incision  - ENT continue to follow     [facility] at [phone] with any questions/concerns.

## 2023-05-23 NOTE — PROGRESS NOTE ADULT - SUBJECTIVE AND OBJECTIVE BOX
HPI:  HPI: 63  female comes to ER with complaint of severe left side head/face pain, intermittent dizziness, also intermittent left CSF otorrhea. She has hx of left cholesteatoma, chronic left mastoiditis  She had an eustachian ventilating tube placed in 2020 (?) which was removed about 1 month ago. She admits that since removal of  tube she is having CSF leakage from left ear and pressure like left side headache.  She states that today she had severe left side headache and left facial like pain and decided to come to ER. She requested to  speak  with Dr. Ramirez who is following her for CSF leak.   CT head is negative for acute pathology; no hemorrhage, no stroke, no hydrocephalus. (16 May 2023 16:15)        Hospital Course:   5/16: Admitted for OR tomorrow. CT haydee complete. S/p L EVD placement and L temp crani for CSF leak repair w/ ENT.   5/17: POD0 Left craniotomy for repair of left otorrhea, intra op EVD placement on 05/17/23. Empiric meningitis coverage started, f/u ID recs.   5/18: POD1, KAMAR overnight. Tramadol started for incisional pain. CT haydee completed today. Flagyl dc'd, cefepime added. Oliveira removed, f/u TOV.  5/19: POD2, kamar overnight. Peripheral IV placed. SG GRECIA d/c. abx d/c per ID. Pt endorsed CP, non-radiating. EKG unremarkable and cardiac enzymes WNL. SQL started.   5/20: POD3, given 500 cc bolus. EVD output WNL. Given 1L bolus.  5/21: POD 4. EVD @ 5. Neuro stable. [place] removed. Patient complaining of clear otorrhea from L ear, none visualized. ENT contacted and reassured mild leakage is normal d/t how ear is packed upon closing, and to notify if worsens.   5/22: POD 5. EVD @ 5. Neuro stable. Robaxin started for left jaw muscle spasm. CSF profile sent for preop.  5/23: POD 6. EVD @ 5. KAMAR overnight, neuro stable.      Vital Signs Last 24 Hrs  T(C): 36.9 (22 May 2023 22:13), Max: 37.6 (22 May 2023 17:48)  T(F): 98.4 (22 May 2023 22:13), Max: 99.7 (22 May 2023 17:48)  HR: 56 (23 May 2023 00:00) (51 - 72)  BP: 109/53 (23 May 2023 00:00) (104/56 - 132/63)  BP(mean): 77 (23 May 2023 00:00) (74 - 91)  RR: 14 (23 May 2023 00:00) (11 - 29)  SpO2: 99% (23 May 2023 00:00) (91% - 100%)    Parameters below as of 23 May 2023 00:00  Patient On (Oxygen Delivery Method): nasal cannula  O2 Flow (L/min): 2      I&O's Detail    21 May 2023 07:01  -  22 May 2023 07:00  --------------------------------------------------------  IN:    Oral Fluid: 880 mL  Total IN: 880 mL    OUT:    External Ventricular Device (mL): 218 mL    Voided (mL): 1900 mL  Total OUT: 2118 mL    Total NET: -1238 mL      22 May 2023 07:01  -  23 May 2023 00:16  --------------------------------------------------------  IN:    Oral Fluid: 550 mL  Total IN: 550 mL    OUT:    External Ventricular Device (mL): 135 mL    Voided (mL): 1550 mL  Total OUT: 1685 mL    Total NET: -1135 mL        I&O's Summary    21 May 2023 07:01  -  22 May 2023 07:00  --------------------------------------------------------  IN: 880 mL / OUT: 2118 mL / NET: -1238 mL    22 May 2023 07:01  -  23 May 2023 00:16  --------------------------------------------------------  IN: 550 mL / OUT: 1685 mL / NET: -1135 mL        PHYSICAL EXAM:  General: patient seen laying supine in bed in NAD on RA  Neuro: AAOx3, FC, OE spontaneously, speech clear and fluent, face symmetric, no pronator drift, strength 5/5 b/l UE and LE, sensation grossly intact to light touch throughout  HEENT: PERRL, EOMI, no visualized otorrhea   Pulmonary: chest rise symmetric  Abdomen: soft, nontender, nondistended  Ext: perfusing well  Skin: warm, dry  Wound: Crani incision site w/ baci and xeroform + staples       TUBES/LINES:  [] CVC  [] A-line  [] Lumbar Drain  [] Ventriculostomy  [] GRECIA  [] Oliveira  [] NGT   [] Other    DIET:  [] NPO  [] Mechanical  [] Tube feeds    LABS:                        11.6   4.98  )-----------( 222      ( 22 May 2023 05:30 )             36.5     05-22    139  |  100  |  6<L>  ----------------------------<  101<H>  3.7   |  30  |  0.50    Ca    8.4      22 May 2023 05:30  Phos  4.1     05-22  Mg     2.1     05-22              CAPILLARY BLOOD GLUCOSE          Drug Levels: [] N/A  Vancomycin Level, Trough: 9.1 ug/mL (05-19 @ 12:34)  Vancomycin Level, Trough: 6.5 ug/mL (05-18 @ 13:23)    CSF Analysis: [] N/A  RBC Count - Spinal Fluid: 259 /uL (05-22 @ 09:33)  CSF Lymphocytes: 4 % (05-22 @ 09:33)  Glucose, CSF: 55 mg/dL (05-22 @ 09:33)  Protein, CSF: 62 mg/dL (05-22 @ 09:33)      Allergies    penicillins (Anaphylaxis)  dairy products (Angioedema)    Intolerances        Home Medications:  losartan 50 mg oral tablet: 1 orally once a day (at bedtime) (16 May 2023 17:00)  rosuvastatin 40 mg oral capsule: 1 orally once a day (16 May 2023 17:00)      MEDICATIONS:  Antibiotics:    Neuro:  acetaminophen     Tablet .. 650 milliGRAM(s) Oral every 6 hours PRN  levETIRAcetam 500 milliGRAM(s) Oral two times a day  methocarbamol 500 milliGRAM(s) Oral every 12 hours  metoclopramide Injectable 10 milliGRAM(s) IV Push every 8 hours PRN  traMADol 50 milliGRAM(s) Oral every 4 hours PRN  traMADol 25 milliGRAM(s) Oral every 4 hours PRN    Anticoagulation:  enoxaparin Injectable 40 milliGRAM(s) SubCutaneous every 24 hours    OTHER:  benzocaine/menthol Lozenge 1 Lozenge Oral every 4 hours PRN  bisacodyl 5 milliGRAM(s) Oral daily PRN  chlorhexidine 2% Cloths 1 Application(s) Topical daily  polyethylene glycol 3350 17 Gram(s) Oral two times a day  senna 2 Tablet(s) Oral at bedtime    IVF:    CULTURES:  Culture Results:   No growth to date (05-17 @ 19:30)  Culture Results:   No growth (05-17 @ 16:17)    RADIOLOGY & ADDITIONAL TESTS:      ASSESSMENT:  63 years old female with left otorrhea admitted from ED with severe left sided headache and intermittent dizzinesss for Left craniotomy for repair of left otorrhea, intra op EVD placement on 05/17/23.    Neuro:  - neuro/vitals q2h  - CT haydee complete 5/16, postop CT haydee completed  - EVD open @5 cmH20, monitor outputs  - Pain control prn tramadol   - Robaxin for jaw pain     Cardio:  - SBP<140, qtc 431 5/19  - Restart home losartan 50 if needed  - Statin held for myalgia     Pulm:   - RA    GI:  - Regular diet   - Bowel regimen    Endo:   - ISS dced     Renal:  - voiding    ID:   - ID consulted for empiric meningitis coverage, now off antibiotics   - s/p vanc/cefepime (5/17-5/19)   - f/u OR cultures    Heme:  -SCDs for DVT ppx/ SQL     Dispo:   - Pending shunt plan     Discussed w/ Dr. Ramirez and Dr. Beaver       Assessment:  Present when checked    []  GCS  E   V  M     Heart Failure: []Acute, [] acute on chronic , []chronic  Heart Failure:  [] Diastolic (HFpEF), [] Systolic (HFrEF), []Combined (HFpEF and HFrEF), [] RHF, [] Pulm HTN, [] Other    [] MARY, [] ATN, [] AIN, [] other  [] CKD1, [] CKD2, [] CKD 3, [] CKD 4, [] CKD 5, []ESRD    Encephalopathy: [] Metabolic, [] Hepatic, [] toxic, [] Neurological, [] Other    Abnormal Nurtitional Status: [] malnurtition (see nutrition note), [ ]underweight: BMI < 19, [] morbid obesity: BMI >40, [] Cachexia    [] Sepsis  [] hypovolemic shock,[] cardiogenic shock, [] hemorrhagic shock, [] neuogenic shock  [] Acute Respiratory Failure  []Cerebral edema, [] Brain compression/ herniation,   [] Functional quadriplegia  [] Acute blood loss anemia

## 2023-05-23 NOTE — PROGRESS NOTE ADULT - SUBJECTIVE AND OBJECTIVE BOX
Surgery: Ventriculoperitoneal shunt   Consent: Signed by patient vs HCP                   NAME/NUMBER of HCP:     Representative Consent: [  ] Signed by patient vs HCP      penicillins (Anaphylaxis)  dairy products (Angioedema)    OVERNIGHT EVENTS: KAMAR o/n.     T(C): 36.8 (05-23-23 @ 05:48), Max: 37.6 (05-22-23 @ 17:48)  HR: 51 (05-23-23 @ 06:00) (51 - 72)  BP: 124/60 (05-23-23 @ 06:00) (100/52 - 132/61)  RR: 12 (05-23-23 @ 06:00) (12 - 29)  SpO2: 98% (05-23-23 @ 06:00) (91% - 100%)  Wt(kg): --    EXAM:  General: patient seen laying supine in bed in NAD on RA  Neuro: AAOx3, FC, OE spontaneously, speech clear and fluent, face symmetric, no pronator drift, strength 5/5 b/l UE and LE, sensation grossly intact to light touch throughout  HEENT: PERRL, EOMI, no visualized otorrhea   Pulmonary: chest rise symmetric  Abdomen: soft, nontender, nondistended  Ext: perfusing well  Skin: warm, dry  Wound: Crani incision site w/ baci and xeroform + staples     05-23    137  |  102  |  8   ----------------------------<  105<H>  3.6   |  27  |  0.52    Ca    8.9      23 May 2023 05:35  Phos  4.2     05-23  Mg     2.1     05-23      CBC Full  -  ( 23 May 2023 05:35 )  WBC Count : 5.56 K/uL  RBC Count : 4.06 M/uL  Hemoglobin : 11.6 g/dL  Hematocrit : 35.9 %  Platelet Count - Automated : 215 K/uL  Mean Cell Volume : 88.4 fl  Mean Cell Hemoglobin : 28.6 pg  Mean Cell Hemoglobin Concentration : 32.3 gm/dL  Auto Neutrophil # : x  Auto Lymphocyte # : x  Auto Monocyte # : x  Auto Eosinophil # : x  Auto Basophil # : x  Auto Neutrophil % : x  Auto Lymphocyte % : x  Auto Monocyte % : x  Auto Eosinophil % : x  Auto Basophil % : x    PT/INR - ( 23 May 2023 05:35 )   PT: 12.6 sec;   INR: 1.06       PTT - ( 23 May 2023 05:35 )  PTT:32.6 sec    Pregnancy test: N/A  Type & Screen (in past 72hrs):     2 Type & Screen within 72 hours if anticipate blood need in OR: Pending      Blood ordered and on hold for OR:   [ ] No need     [ ] 1u pRBC on hold      [ ] 2u pRBC on hold    CXR: < from: Xray Chest 1 View- PORTABLE-Routine (05.18.23 @ 05:32) >  FINDINGS:  No acute lung disease and no significant change since the previous study.  Unremarkable cardiomediastinal silhouette.    The lungs are clear. No pleural effusions. No pneumothorax.    No acute abnormalities of the soft tissues and osseous structures.      IMPRESSION:  No acute pathology.    < end of copied text >    EKG:  ECHO:   Medical Clearances: ICU    Last dose of antiplatelet/anticoagulation drug:     Implanted Devices (pacemaker, drug pump...etc):  []YES   [X] NO                  If yes --> EPS consulted to interrogate device: [ ] YES  [ ] NO                            If yes -->  EPS called to let them know patient going for surgery: [ ] device needs to be turned off                                                                                                                                                 [ ] magnet needs to be placed for surgery                                                                                                                                                [ ] nothing to do per EP, may proceed with bovie use in OR                                     3M nasal swab ordered? YES   Cranial surgery: Order written for hair to be shampooed night before surgery and morning before surgery - NO - EVD  Chlorhexidine Wipes ordered for Neck Down? YES               Assessment:  63 years old female with left otorrhea admitted from ED with severe left sided headache and intermittent dizzinesss for Left craniotomy for repair of left otorrhea, intra op EVD placement on 05/17/23. Pre-op for VPS.     Neuro:  - neuro/vitals q2h  - CT haydee complete 5/16, postop CT haydee completed  - EVD open @5 cmH20, monitor outputs  - Pain control prn tramadol   - Robaxin for jaw pain     Cardio:  - SBP<140, qtc 431 5/19  - Restart home losartan 50 if needed  - Statin held for myalgia     Pulm:   - RA    GI:  - Regular diet   - Bowel regimen    Endo:   - ISS dced     Renal:  - voiding    ID:   - ID consulted for empiric meningitis coverage, now off antibiotics   - s/p vanc/cefepime (5/17-5/19)   - f/u OR cultures    Heme:  -SCDs for DVT ppx/ SQL     Dispo:   - Pending shunt plan     Discussed w/ Dr. Ramirez and Dr. Beaver          Surgery: Left Ventriculoperitoneal Shunt   Consent: Signed by patient     Representative Consent: [x] Signed by patient vs HCP      penicillins (Anaphylaxis)  dairy products (Angioedema)    OVERNIGHT EVENTS: KAMAR o/n.     T(C): 36.8 (05-23-23 @ 05:48), Max: 37.6 (05-22-23 @ 17:48)  HR: 51 (05-23-23 @ 06:00) (51 - 72)  BP: 124/60 (05-23-23 @ 06:00) (100/52 - 132/61)  RR: 12 (05-23-23 @ 06:00) (12 - 29)  SpO2: 98% (05-23-23 @ 06:00) (91% - 100%)  Wt(kg): --    EXAM:  General: patient seen laying supine in bed in NAD on RA  Neuro: AAOx3, FC, OE spontaneously, speech clear and fluent, face symmetric, no pronator drift, strength 5/5 b/l UE and LE, sensation grossly intact to light touch throughout  HEENT: PERRL, EOMI, no visualized otorrhea   Pulmonary: chest rise symmetric  Abdomen: soft, nontender, nondistended  Ext: perfusing well  Skin: warm, dry  Wound: Crani incision site w/ baci and xeroform + staples     05-23    137  |  102  |  8   ----------------------------<  105<H>  3.6   |  27  |  0.52    Ca    8.9      23 May 2023 05:35  Phos  4.2     05-23  Mg     2.1     05-23      CBC Full  -  ( 23 May 2023 05:35 )  WBC Count : 5.56 K/uL  RBC Count : 4.06 M/uL  Hemoglobin : 11.6 g/dL  Hematocrit : 35.9 %  Platelet Count - Automated : 215 K/uL  Mean Cell Volume : 88.4 fl  Mean Cell Hemoglobin : 28.6 pg  Mean Cell Hemoglobin Concentration : 32.3 gm/dL  Auto Neutrophil # : x  Auto Lymphocyte # : x  Auto Monocyte # : x  Auto Eosinophil # : x  Auto Basophil # : x  Auto Neutrophil % : x  Auto Lymphocyte % : x  Auto Monocyte % : x  Auto Eosinophil % : x  Auto Basophil % : x    PT/INR - ( 23 May 2023 05:35 )   PT: 12.6 sec;   INR: 1.06       PTT - ( 23 May 2023 05:35 )  PTT:32.6 sec    Pregnancy test: N/A  Type & Screen (in past 72hrs):     2 Type & Screen within 72 hours if anticipate blood need in OR: Pending      Blood ordered and on hold for OR:   [ ] No need     [ ] 1u pRBC on hold      [ ] 2u pRBC on hold    CXR: < from: Xray Chest 1 View- PORTABLE-Routine (05.18.23 @ 05:32) >  FINDINGS:  No acute lung disease and no significant change since the previous study.  Unremarkable cardiomediastinal silhouette.    The lungs are clear. No pleural effusions. No pneumothorax.    No acute abnormalities of the soft tissues and osseous structures.      IMPRESSION:  No acute pathology.    < end of copied text >    EKG:  ECHO:   Medical Clearances: ICU    Last dose of antiplatelet/anticoagulation drug:     Implanted Devices (pacemaker, drug pump...etc):  []YES   [X] NO                  If yes --> EPS consulted to interrogate device: [ ] YES  [ ] NO                            If yes -->  EPS called to let them know patient going for surgery: [ ] device needs to be turned off                                                                                                                                                 [ ] magnet needs to be placed for surgery                                                                                                                                                [ ] nothing to do per EP, may proceed with bovie use in OR                                     3M nasal swab ordered? YES   Cranial surgery: Order written for hair to be shampooed night before surgery and morning before surgery - NO - EVD  Chlorhexidine Wipes ordered for Neck Down? YES               Assessment:  63 years old female with left otorrhea admitted from ED with severe left sided headache and intermittent dizzinesss for Left craniotomy for repair of left otorrhea, intra op EVD placement on 05/17/23. Pre-op for VPS.     Neuro:  - neuro/vitals q2h  - CT haydee complete 5/16, postop CT haydee completed  - EVD open @5 cmH20, monitor outputs  - Pain control prn tramadol   - Robaxin for jaw pain     Cardio:  - SBP<140, qtc 431 5/19  - Restart home losartan 50 if needed  - Statin held for myalgia     Pulm:   - RA    GI:  - Regular diet   - Bowel regimen    Endo:   - ISS dced     Renal:  - voiding    ID:   - ID consulted for empiric meningitis coverage, now off antibiotics   - s/p vanc/cefepime (5/17-5/19)   - f/u OR cultures    Heme:  -SCDs for DVT ppx/ SQL     Dispo:   - Pending shunt plan     Discussed w/ Dr. Ramirez and Dr. Beaver

## 2023-05-23 NOTE — PROGRESS NOTE ADULT - SUBJECTIVE AND OBJECTIVE BOX
NSCU ATTENDING -- ADDITIONAL PROGRESS NOTE    Nighttime rounds were performed -- please refer to earlier Progress Note for HPI details.      HPI: 64 y/o female with PMH of HTN, HLD. She presented to ER with complaint of severe left side head/face pain, intermittent dizziness, also intermittent left CSF otorrhea. She has hx of left cholesteatoma, chronic left mastoiditis  She had an eustachian ventilating tube placed in 2020 (?) which was removed about 1 month ago. She admits that since removal of  tube she is having CSF leakage from left ear and pressure like left side headache.  She states that today she had severe left side headache and left facial like pain and decided to come to ER. She requested to  speak  with Dr. Ramirez who is following her for CSF leak.   CT head is negative for acute pathology; no hemorrhage, no stroke, no hydrocephalus. (16 May 2023 16:15)    On admission, the patient was:  GCS: 15  Hunt-Deleon:  modified Ellis:  ICH score:  NIHSS:        ICU Vital Signs Last 24 Hrs  T(C): 37 (23 May 2023 17:47), Max: 37.7 (23 May 2023 09:03)  T(F): 98.6 (23 May 2023 17:47), Max: 99.8 (23 May 2023 09:03)  HR: 60 (23 May 2023 19:00) (51 - 73)  BP: 109/54 (23 May 2023 19:00) (100/52 - 141/59)  BP(mean): 76 (23 May 2023 19:00) (74 - 96)  RR: 14 (23 May 2023 19:00) (12 - 24)  SpO2: 95% (23 May 2023 19:00) (92% - 99%)      05-22-23 @ 07:01  -  05-23-23 @ 07:00  --------------------------------------------------------  IN: 650 mL / OUT: 1715 mL / NET: -1065 mL    05-23-23 @ 07:01  -  05-23-23 @ 19:35  --------------------------------------------------------  IN: 840 mL / OUT: 939 mL / NET: -99 mL      EXAMINATION:  General: Calm  HEENT:  MMM  Neuro: Awake, alert, oriented x 3  Pupils 3mm and reactive, EOMI  Follows commands, moves all extremities 5/5 strength, sensation intact  Cards: S1/S2, RRR  Respiratory: Clear, no wheeze  Abdomen: Soft, non- tender  Extremities:  No edema  Skin:  Warm/dry      MEDICATIONS:   acetaminophen     Tablet .. 650 milliGRAM(s) Oral every 6 hours PRN  benzocaine/menthol Lozenge 1 Lozenge Oral every 4 hours PRN  bisacodyl 5 milliGRAM(s) Oral daily PRN  chlorhexidine 2% Cloths 1 Application(s) Topical daily  levETIRAcetam 500 milliGRAM(s) Oral two times a day  methocarbamol 500 milliGRAM(s) Oral every 8 hours  metoclopramide Injectable 10 milliGRAM(s) IV Push every 8 hours PRN  ofloxacin 0.3% Ophthalmic Solution for OTIC Use 4 Drop(s) Left Ear four times a day  polyethylene glycol 3350 17 Gram(s) Oral two times a day  senna 2 Tablet(s) Oral at bedtime  sodium chloride 0.9%. 1000 milliLiter(s) (80 mL/Hr) IV Continuous <Continuous>  traMADol 50 milliGRAM(s) Oral every 4 hours PRN  traMADol 25 milliGRAM(s) Oral every 4 hours PRN    LABS:                     11.6   5.56  )-----------( 215      ( 23 May 2023 05:35 )             35.9     05-23    137  |  102  |  8   ----------------------------<  105<H>  3.6   |  27  |  0.52    Ca    8.9      23 May 2023 05:35  Phos  4.2     05-23  Mg     2.1     05-23      Culture - CSF with Gram Stain (collected 05-23-23 @ 09:46)  Source: .CSF CSF  Gram Stain (05-23-23 @ 12:16):    No organisms seen    No White blood cells      ASSESSMENT  POD 6 s/p L temp crani for CSF leak repair w/ ENT. L EVD placement      PLAN:   Q2 neurochecks  Analgesia prn; added robaxin  EVD open @5cmH20. Monitor ICPs/output/color. Pending VPS 4/24  Incentive spirometry. Keep sats >92%  SBP goal 100-140  On losartan at home. Resume if needed.   Bowel regimen [] colace [x] senna [] other:  LBM: 5/22  Goal euglycemia (-180)  VTE prophylaxis: [x] SCDs [x] chemoprophylaxis   Afebrile. Monitor for signs and symptoms of infection    Additional 45 minutes of critical care time     NSCU ATTENDING -- ADDITIONAL PROGRESS NOTE    Nighttime rounds were performed -- please refer to earlier Progress Note for HPI details.      HPI: 62 y/o female with PMH of HTN, HLD. She presented to ER with complaint of severe left side head/face pain, intermittent dizziness, also intermittent left CSF otorrhea. She has hx of left cholesteatoma, chronic left mastoiditis  She had an eustachian ventilating tube placed in 2020 (?) which was removed about 1 month ago. She admits that since removal of  tube she is having CSF leakage from left ear and pressure like left side headache.  She states that today she had severe left side headache and left facial like pain and decided to come to ER. She requested to  speak  with Dr. Ramirez who is following her for CSF leak.   CT head is negative for acute pathology; no hemorrhage, no stroke, no hydrocephalus. (16 May 2023 16:15)    On admission, the patient was:  GCS: 15  Hunt-Deleon:  modified Ellis:  ICH score:  NIHSS:      ICU Vital Signs Last 24 Hrs  T(C): 37 (23 May 2023 17:47), Max: 37.7 (23 May 2023 09:03)  T(F): 98.6 (23 May 2023 17:47), Max: 99.8 (23 May 2023 09:03)  HR: 60 (23 May 2023 19:00) (51 - 73)  BP: 109/54 (23 May 2023 19:00) (100/52 - 141/59)  BP(mean): 76 (23 May 2023 19:00) (74 - 96)  RR: 14 (23 May 2023 19:00) (12 - 24)  SpO2: 95% (23 May 2023 19:00) (92% - 99%)      05-22-23 @ 07:01  -  05-23-23 @ 07:00  --------------------------------------------------------  IN: 650 mL / OUT: 1715 mL / NET: -1065 mL    05-23-23 @ 07:01  -  05-23-23 @ 19:35  --------------------------------------------------------  IN: 840 mL / OUT: 939 mL / NET: -99 mL      EXAMINATION:  General: Calm  HEENT:  MMM  Neuro: Awake, alert, oriented x 3  Pupils 3mm and reactive, EOMI  Follows commands, moves all extremities 5/5 strength, sensation intact  Cards: S1/S2, RRR  Respiratory: Clear, no wheeze  Abdomen: Soft, non- tender  Extremities:  No edema  Skin:  Warm/dry      MEDICATIONS:   acetaminophen     Tablet .. 650 milliGRAM(s) Oral every 6 hours PRN  benzocaine/menthol Lozenge 1 Lozenge Oral every 4 hours PRN  bisacodyl 5 milliGRAM(s) Oral daily PRN  chlorhexidine 2% Cloths 1 Application(s) Topical daily  levETIRAcetam 500 milliGRAM(s) Oral two times a day  methocarbamol 500 milliGRAM(s) Oral every 8 hours  metoclopramide Injectable 10 milliGRAM(s) IV Push every 8 hours PRN  ofloxacin 0.3% Ophthalmic Solution for OTIC Use 4 Drop(s) Left Ear four times a day  polyethylene glycol 3350 17 Gram(s) Oral two times a day  senna 2 Tablet(s) Oral at bedtime  sodium chloride 0.9%. 1000 milliLiter(s) (80 mL/Hr) IV Continuous <Continuous>  traMADol 50 milliGRAM(s) Oral every 4 hours PRN  traMADol 25 milliGRAM(s) Oral every 4 hours PRN    LABS:                     11.6   5.56  )-----------( 215      ( 23 May 2023 05:35 )             35.9     05-23    137  |  102  |  8   ----------------------------<  105<H>  3.6   |  27  |  0.52    Ca    8.9      23 May 2023 05:35  Phos  4.2     05-23  Mg     2.1     05-23      Culture - CSF with Gram Stain (collected 05-23-23 @ 09:46)  Source: .CSF CSF  Gram Stain (05-23-23 @ 12:16):    No organisms seen    No White blood cells      ASSESSMENT  POD 6 s/p L temp crani for CSF leak repair w/ ENT. L EVD placement      PLAN:   Q2 neurochecks  Analgesia prn  EVD open @5cmH20. Monitor ICPs/output/color. Pending VPS 4/24  Incentive spirometry. Keep sats >92%  SBP goal 100-140  On losartan at home. Resume if needed.   Bowel regimen [] colace [x] senna [] other:  LBM: 5/22  Goal euglycemia (-180)  VTE prophylaxis: [x] SCDs [x] chemoprophylaxis   Afebrile. Monitor for signs and symptoms of infection    Additional 45 minutes of critical care time

## 2023-05-24 ENCOUNTER — TRANSCRIPTION ENCOUNTER (OUTPATIENT)
Age: 63
End: 2023-05-24

## 2023-05-24 LAB
ANION GAP SERPL CALC-SCNC: 7 MMOL/L — SIGNIFICANT CHANGE UP (ref 5–17)
BLD GP AB SCN SERPL QL: NEGATIVE — SIGNIFICANT CHANGE UP
BUN SERPL-MCNC: 8 MG/DL — SIGNIFICANT CHANGE UP (ref 7–23)
CALCIUM SERPL-MCNC: 8.6 MG/DL — SIGNIFICANT CHANGE UP (ref 8.4–10.5)
CHLORIDE SERPL-SCNC: 104 MMOL/L — SIGNIFICANT CHANGE UP (ref 96–108)
CO2 SERPL-SCNC: 27 MMOL/L — SIGNIFICANT CHANGE UP (ref 22–31)
CREAT SERPL-MCNC: 0.42 MG/DL — LOW (ref 0.5–1.3)
EGFR: 111 ML/MIN/1.73M2 — SIGNIFICANT CHANGE UP
GLUCOSE SERPL-MCNC: 101 MG/DL — HIGH (ref 70–99)
HCT VFR BLD CALC: 36.5 % — SIGNIFICANT CHANGE UP (ref 34.5–45)
HGB BLD-MCNC: 11.6 G/DL — SIGNIFICANT CHANGE UP (ref 11.5–15.5)
MAGNESIUM SERPL-MCNC: 2.1 MG/DL — SIGNIFICANT CHANGE UP (ref 1.6–2.6)
MCHC RBC-ENTMCNC: 28.3 PG — SIGNIFICANT CHANGE UP (ref 27–34)
MCHC RBC-ENTMCNC: 31.8 GM/DL — LOW (ref 32–36)
MCV RBC AUTO: 89 FL — SIGNIFICANT CHANGE UP (ref 80–100)
NRBC # BLD: 0 /100 WBCS — SIGNIFICANT CHANGE UP (ref 0–0)
PHOSPHATE SERPL-MCNC: 4.3 MG/DL — SIGNIFICANT CHANGE UP (ref 2.5–4.5)
PLATELET # BLD AUTO: 240 K/UL — SIGNIFICANT CHANGE UP (ref 150–400)
POTASSIUM SERPL-MCNC: SIGNIFICANT CHANGE UP (ref 3.5–5.3)
POTASSIUM SERPL-SCNC: SIGNIFICANT CHANGE UP (ref 3.5–5.3)
RBC # BLD: 4.1 M/UL — SIGNIFICANT CHANGE UP (ref 3.8–5.2)
RBC # FLD: 13 % — SIGNIFICANT CHANGE UP (ref 10.3–14.5)
RH IG SCN BLD-IMP: POSITIVE — SIGNIFICANT CHANGE UP
SODIUM SERPL-SCNC: 138 MMOL/L — SIGNIFICANT CHANGE UP (ref 135–145)
WBC # BLD: 5.34 K/UL — SIGNIFICANT CHANGE UP (ref 3.8–10.5)
WBC # FLD AUTO: 5.34 K/UL — SIGNIFICANT CHANGE UP (ref 3.8–10.5)

## 2023-05-24 PROCEDURE — 62223 ESTABLISH BRAIN CAVITY SHUNT: CPT | Mod: 62,58

## 2023-05-24 PROCEDURE — 99291 CRITICAL CARE FIRST HOUR: CPT

## 2023-05-24 PROCEDURE — 70450 CT HEAD/BRAIN W/O DYE: CPT | Mod: 26

## 2023-05-24 PROCEDURE — 70450 CT HEAD/BRAIN W/O DYE: CPT | Mod: 26,77

## 2023-05-24 DEVICE — SURGIFOAM PAD 2CM X 6CM X 7MM (12-7): Type: IMPLANTABLE DEVICE | Status: FUNCTIONAL

## 2023-05-24 DEVICE — CATH DISTAL ARES: Type: IMPLANTABLE DEVICE | Status: FUNCTIONAL

## 2023-05-24 DEVICE — VALVE CODMAN CERTAS PLUS INLINE WITH SIPHONGUARD: Type: IMPLANTABLE DEVICE | Status: FUNCTIONAL

## 2023-05-24 DEVICE — CATH BACTISEAL VENTRICULAR: Type: IMPLANTABLE DEVICE | Status: FUNCTIONAL

## 2023-05-24 RX ORDER — CHLORHEXIDINE GLUCONATE 213 G/1000ML
1 SOLUTION TOPICAL DAILY
Refills: 0 | Status: DISCONTINUED | OUTPATIENT
Start: 2023-05-24 | End: 2023-05-25

## 2023-05-24 RX ORDER — ACETAMINOPHEN 500 MG
1000 TABLET ORAL EVERY 8 HOURS
Refills: 0 | Status: DISCONTINUED | OUTPATIENT
Start: 2023-05-24 | End: 2023-05-25

## 2023-05-24 RX ORDER — VANCOMYCIN HCL 1 G
1250 VIAL (EA) INTRAVENOUS ONCE
Refills: 0 | Status: COMPLETED | OUTPATIENT
Start: 2023-05-25 | End: 2023-05-25

## 2023-05-24 RX ORDER — OFLOXACIN OTIC SOLUTION 3 MG/ML
4 SOLUTION/ DROPS AURICULAR (OTIC)
Refills: 0 | Status: DISCONTINUED | OUTPATIENT
Start: 2023-05-24 | End: 2023-05-25

## 2023-05-24 RX ORDER — TRAMADOL HYDROCHLORIDE 50 MG/1
25 TABLET ORAL EVERY 4 HOURS
Refills: 0 | Status: DISCONTINUED | OUTPATIENT
Start: 2023-05-24 | End: 2023-05-25

## 2023-05-24 RX ORDER — POLYETHYLENE GLYCOL 3350 17 G/17G
17 POWDER, FOR SOLUTION ORAL
Refills: 0 | Status: DISCONTINUED | OUTPATIENT
Start: 2023-05-24 | End: 2023-05-25

## 2023-05-24 RX ORDER — SENNA PLUS 8.6 MG/1
2 TABLET ORAL AT BEDTIME
Refills: 0 | Status: DISCONTINUED | OUTPATIENT
Start: 2023-05-24 | End: 2023-05-25

## 2023-05-24 RX ORDER — HYDROMORPHONE HYDROCHLORIDE 2 MG/ML
0.5 INJECTION INTRAMUSCULAR; INTRAVENOUS; SUBCUTANEOUS ONCE
Refills: 0 | Status: DISCONTINUED | OUTPATIENT
Start: 2023-05-24 | End: 2023-05-24

## 2023-05-24 RX ORDER — LEVETIRACETAM 250 MG/1
500 TABLET, FILM COATED ORAL
Refills: 0 | Status: DISCONTINUED | OUTPATIENT
Start: 2023-05-24 | End: 2023-05-25

## 2023-05-24 RX ORDER — TRAMADOL HYDROCHLORIDE 50 MG/1
50 TABLET ORAL EVERY 4 HOURS
Refills: 0 | Status: DISCONTINUED | OUTPATIENT
Start: 2023-05-24 | End: 2023-05-25

## 2023-05-24 RX ORDER — POVIDONE-IODINE 5 %
1 AEROSOL (ML) TOPICAL ONCE
Refills: 0 | Status: DISCONTINUED | OUTPATIENT
Start: 2023-05-25 | End: 2023-05-25

## 2023-05-24 RX ORDER — METOCLOPRAMIDE HCL 10 MG
10 TABLET ORAL EVERY 8 HOURS
Refills: 0 | Status: DISCONTINUED | OUTPATIENT
Start: 2023-05-24 | End: 2023-05-25

## 2023-05-24 RX ORDER — BENZOCAINE AND MENTHOL 5; 1 G/100ML; G/100ML
1 LIQUID ORAL EVERY 4 HOURS
Refills: 0 | Status: DISCONTINUED | OUTPATIENT
Start: 2023-05-24 | End: 2023-05-25

## 2023-05-24 RX ORDER — METHOCARBAMOL 500 MG/1
500 TABLET, FILM COATED ORAL EVERY 8 HOURS
Refills: 0 | Status: DISCONTINUED | OUTPATIENT
Start: 2023-05-24 | End: 2023-05-25

## 2023-05-24 RX ORDER — CHLORHEXIDINE GLUCONATE 213 G/1000ML
1 SOLUTION TOPICAL ONCE
Refills: 0 | Status: COMPLETED | OUTPATIENT
Start: 2023-05-25 | End: 2023-05-25

## 2023-05-24 RX ORDER — SODIUM CHLORIDE 9 MG/ML
1000 INJECTION INTRAMUSCULAR; INTRAVENOUS; SUBCUTANEOUS
Refills: 0 | Status: DISCONTINUED | OUTPATIENT
Start: 2023-05-24 | End: 2023-05-25

## 2023-05-24 RX ADMIN — Medication 1 APPLICATION(S): at 06:53

## 2023-05-24 RX ADMIN — LEVETIRACETAM 500 MILLIGRAM(S): 250 TABLET, FILM COATED ORAL at 06:02

## 2023-05-24 RX ADMIN — LEVETIRACETAM 500 MILLIGRAM(S): 250 TABLET, FILM COATED ORAL at 18:36

## 2023-05-24 RX ADMIN — HYDROMORPHONE HYDROCHLORIDE 0.5 MILLIGRAM(S): 2 INJECTION INTRAMUSCULAR; INTRAVENOUS; SUBCUTANEOUS at 16:00

## 2023-05-24 RX ADMIN — OFLOXACIN OTIC SOLUTION 4 DROP(S): 3 SOLUTION/ DROPS AURICULAR (OTIC) at 06:03

## 2023-05-24 RX ADMIN — OFLOXACIN OTIC SOLUTION 4 DROP(S): 3 SOLUTION/ DROPS AURICULAR (OTIC) at 23:31

## 2023-05-24 RX ADMIN — CHLORHEXIDINE GLUCONATE 1 APPLICATION(S): 213 SOLUTION TOPICAL at 06:30

## 2023-05-24 RX ADMIN — POLYETHYLENE GLYCOL 3350 17 GRAM(S): 17 POWDER, FOR SOLUTION ORAL at 21:52

## 2023-05-24 RX ADMIN — METHOCARBAMOL 500 MILLIGRAM(S): 500 TABLET, FILM COATED ORAL at 03:01

## 2023-05-24 RX ADMIN — TRAMADOL HYDROCHLORIDE 50 MILLIGRAM(S): 50 TABLET ORAL at 21:27

## 2023-05-24 RX ADMIN — SODIUM CHLORIDE 80 MILLILITER(S): 9 INJECTION INTRAMUSCULAR; INTRAVENOUS; SUBCUTANEOUS at 23:59

## 2023-05-24 RX ADMIN — CHLORHEXIDINE GLUCONATE 1 APPLICATION(S): 213 SOLUTION TOPICAL at 11:11

## 2023-05-24 RX ADMIN — Medication 1 DROP(S): at 06:02

## 2023-05-24 RX ADMIN — HYDROMORPHONE HYDROCHLORIDE 0.5 MILLIGRAM(S): 2 INJECTION INTRAMUSCULAR; INTRAVENOUS; SUBCUTANEOUS at 16:15

## 2023-05-24 RX ADMIN — OFLOXACIN OTIC SOLUTION 4 DROP(S): 3 SOLUTION/ DROPS AURICULAR (OTIC) at 11:11
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
## 2023-05-24 NOTE — PRE-ANESTHESIA EVALUATION ADULT - NSANTHPMHFT_GEN_ALL_CORE
63  female comes to ER with complaint of severe left side head/face pain, intermittent dizziness, also intermittent left CSF otorrhea. She has hx of left cholesteatoma, chronic left mastoiditis  She had an eustachian ventilating tube placed in 2020 (?) which was removed about 1 month ago. She admits that since removal of  tube she is having CSF leakage from left ear and pressure like left side headache.  She states that today she had severe left side headache and left facial like pain and decided to come to ER. She requested to  speak  with Dr. Ramirez who is following her for CSF leak.   CT head is negative for acute pathology; no hemorrhage, no stroke, no hydrocephalus.  No s/p craniotomy for repair of CSF otorrhea 63  female comes to ER with complaint of severe left side head/face pain, intermittent dizziness, also intermittent left CSF otorrhea. She has hx of left cholesteatoma, chronic left mastoiditis  She had an eustachian ventilating tube placed in 2020 (?) which was removed about 1 month ago. She admits that since removal of  tube she is having CSF leakage from left ear and pressure like left side headache.  Now s/p L craniotomy w/CSF leak repair  METS>4  CT head is negative for acute pathology; no hemorrhage, no stroke, no hydrocephalus.  No s/p craniotomy for repair of CSF otorrhea

## 2023-05-24 NOTE — PROGRESS NOTE ADULT - ASSESSMENT
Assessment and Plan:    MARIA FERNANDA VILLARREAL is a 63 years old female with left otorrhea admitted from ED with severe left sided headache and intermittent dizziness for Left craniotomy for repair of left middle fossa CSF leak, intra op EVD placement on 05/17/23.    Plan:  Neuro:  - Pain control   - Incision care as per NSGY   - if she requires oxygen, please avoid nasal cannula if possible as places pressure on post-auricular incision  - ENT continue to follow     [facility] at [phone] with any questions/concerns.

## 2023-05-24 NOTE — CHART NOTE - NSCHARTNOTEFT_GEN_A_CORE
Procedure:  shunt  Surgeon: Dr. Mckenzie and Dr. Ramirez     S: Pt seen in PACU. Pt complains of headache. CT scan pending. No nausea or vomiting. Denies chest pain or difficulty breathing.     O:  T(C): 36.3 (05-24-23 @ 14:25), Max: 36.3 (05-24-23 @ 14:25)  T(F): 97.3 (05-24-23 @ 14:25), Max: 97.3 (05-24-23 @ 14:25)  HR: 52 (05-24-23 @ 15:25) (51 - 61)  BP: 126/68 (05-24-23 @ 15:25) (126/64 - 151/66)  RR: 10 (05-24-23 @ 15:25) (9 - 16)  SpO2: 97% (05-24-23 @ 15:25) (96% - 100%)  Wt(kg): --                        11.6   5.34  )-----------( 240      ( 24 May 2023 05:30 )             36.5     05-24    138  |  104  |  8   ----------------------------<  101<H>  See Note   |  27  |  0.42<L>    Ca    8.6      24 May 2023 05:30  Phos  4.3     05-24  Mg     2.1     05-24        Gen: Awake, Alert, NAD  C/V: NSR  Pulm: Nonlabored breathing, no respiratory distress  Abd: soft, non-distended, appropriately tender to palpation, no rebound/guarding, incisions c/d/i   Extrem: WWP      A/P: 63yFemale s/p VPS    - Care per Neurosurgery  - Team 4c following

## 2023-05-24 NOTE — BRIEF OPERATIVE NOTE - OPERATION/FINDINGS
Refer to Neurosurgery note for cranial portion of VPS placement.     Peritoneum entered by cutdown. Additional 5mm port placed in RLQ. VPS catheter introduced into right side of peritoneum by Neurosurgery under visualization. Noted CSF dripping from catheter tip. Redundant catheter length trimmed and removed. Catheter tip placed in RLQ. Port sites closed w/ 4-0 Monocryl & dermabond. Refer to Neurosurgery note for cranial portion of VPS placement.     Peritoneum entered by cutdown. Additional 5mm port placed in LLQ. VPS catheter introduced into right side of peritoneum by Neurosurgery under visualization. Noted CSF dripping from catheter tip. Redundant catheter length trimmed and removed. Catheter tip placed in RLQ. Port sites closed w/ 4-0 Monocryl & dermabond.

## 2023-05-24 NOTE — PRE-ANESTHESIA EVALUATION ADULT - NSANTHPEFT_GEN_ALL_CORE
GEN: A&Ox3, NAD, overweight  HEENT: no abnormal facies, neck unremarkable  CV: RRR, no M/R/G  PULM: CTABL, breathing comfortably on RA  NEURO: moves all 4 extremities, no gross deficit GEN: A&Ox3, NAD, overweight  HEENT: no abnormal facies, neck unremarkable, R eye conjunctival hemmorhage  CV: RRR, no M/R/G  PULM: CTABL, breathing comfortably on RA  NEURO: moves all 4 extremities, no gross deficit

## 2023-05-24 NOTE — PROGRESS NOTE ADULT - SUBJECTIVE AND OBJECTIVE BOX
NEUROSURGERY POST OP NOTE:    POD# 0 S/P L frontal VPS Certas @5.     S:   Patient seen and examined at bedside in PACU. Patient complaining of L sided incisional pain and headache, otherwise no complaints at this time.          T(C): 36.3 (05-24-23 @ 14:25), Max: 37.3 (05-24-23 @ 11:30)  HR: 62 (05-24-23 @ 17:25) (51 - 77)  BP: 112/61 (05-24-23 @ 17:25) (104/56 - 151/66)  RR: 24 (05-24-23 @ 17:25) (9 - 24)  SpO2: 98% (05-24-23 @ 17:25) (93% - 100%)      05-23-23 @ 07:01  -  05-24-23 @ 07:00  --------------------------------------------------------  IN: 1680 mL / OUT: 1478 mL / NET: 202 mL    05-24-23 @ 07:01  -  05-24-23 @ 18:18  --------------------------------------------------------  IN: 400 mL / OUT: 464 mL / NET: -64 mL        acetaminophen     Tablet .. 1000 milliGRAM(s) Oral every 8 hours PRN  artificial  tears Solution 1 Drop(s) Right EYE every 4 hours PRN  benzocaine/menthol Lozenge 1 Lozenge Oral every 4 hours PRN  bisacodyl 5 milliGRAM(s) Oral daily PRN  chlorhexidine 2% Cloths 1 Application(s) Topical daily  levETIRAcetam 500 milliGRAM(s) Oral two times a day  methocarbamol 500 milliGRAM(s) Oral every 8 hours PRN  metoclopramide Injectable 10 milliGRAM(s) IV Push every 8 hours PRN  ofloxacin 0.3% Ophthalmic Solution for OTIC Use 4 Drop(s) Left Ear four times a day  polyethylene glycol 3350 17 Gram(s) Oral two times a day  senna 2 Tablet(s) Oral at bedtime  sodium chloride 0.9%. 1000 milliLiter(s) IV Continuous <Continuous>  traMADol 50 milliGRAM(s) Oral every 4 hours PRN  traMADol 25 milliGRAM(s) Oral every 4 hours PRN      RADIOLOGY:       Exam:  General: NAD, pt is comfortably sitting up in bed, A&O x3, on RA  HEENT: CN II-XII grossly intact, PERRL 3mm, EOMI b/l, face symmetric, tongue midline, neck FROM  Cardiovascular: RRR, normal S1 and S2   Respiratory: lungs CTAB, no wheezing, rhonchi, or crackles   GI: normoactive BS to auscultation, abd soft, NTND   Neuro: no aphasia, speech clear, no dysmetria, no pronator drift  strength 5/5 throughout all 4 extremities  sensation intact to light touch throughout   Extremities: distal pulses 2+ x4   Wound/incision: L frontal VPS incision site with dressing C/D/I (staples), abdominal incisions with dermabond       Assessment:   63 years old female with left otorrhea admitted from ED with severe left sided headache and intermittent dizzinesss for Left craniotomy for repair of left otorrhea, intra op EVD placement on 05/17/23. Now s/p L VPS (Certas @5) (5/24).       Plan:        Assessment:  Present when checked    []  GCS  E   V  M     Heart Failure: []Acute, [] acute on chronic , []chronic  Heart Failure:  [] Diastolic (HFpEF), [] Systolic (HFrEF), []Combined (HFpEF and HFrEF), [] RHF, [] Pulm HTN, [] Other    [] MARY, [] ATN, [] AIN, [] other  [] CKD1, [] CKD2, [] CKD 3, [] CKD 4, [] CKD 5, []ESRD    Encephalopathy: [] Metabolic, [] Hepatic, [] toxic, [] Neurological, [] Other    Abnormal Nurtitional Status: [] malnurtition (see nutrition note), [ ]underweight: BMI < 19, [] morbid obesity: BMI >40, [] Cachexia    [] Sepsis  [] hypovolemic shock,[] cardiogenic shock, [] hemorrhagic shock, [] neuogenic shock  [] Acute Respiratory Failure  []Cerebral edema, [] Brain compression/ herniation,   [] Functional quadriplegia  [] Acute blood loss anemia       NEUROSURGERY POST OP NOTE:    POD# 0 S/P L frontal VPS Certas @5.     S:   Patient seen and examined at bedside in PACU. Patient complaining of L sided incisional pain and headache, otherwise no complaints at this time.        T(C): 36.3 (05-24-23 @ 14:25), Max: 37.3 (05-24-23 @ 11:30)  HR: 62 (05-24-23 @ 17:25) (51 - 77)  BP: 112/61 (05-24-23 @ 17:25) (104/56 - 151/66)  RR: 24 (05-24-23 @ 17:25) (9 - 24)  SpO2: 98% (05-24-23 @ 17:25) (93% - 100%)      05-23-23 @ 07:01  -  05-24-23 @ 07:00  --------------------------------------------------------  IN: 1680 mL / OUT: 1478 mL / NET: 202 mL    05-24-23 @ 07:01  -  05-24-23 @ 18:18  --------------------------------------------------------  IN: 400 mL / OUT: 464 mL / NET: -64 mL        acetaminophen     Tablet .. 1000 milliGRAM(s) Oral every 8 hours PRN  artificial  tears Solution 1 Drop(s) Right EYE every 4 hours PRN  benzocaine/menthol Lozenge 1 Lozenge Oral every 4 hours PRN  bisacodyl 5 milliGRAM(s) Oral daily PRN  chlorhexidine 2% Cloths 1 Application(s) Topical daily  levETIRAcetam 500 milliGRAM(s) Oral two times a day  methocarbamol 500 milliGRAM(s) Oral every 8 hours PRN  metoclopramide Injectable 10 milliGRAM(s) IV Push every 8 hours PRN  ofloxacin 0.3% Ophthalmic Solution for OTIC Use 4 Drop(s) Left Ear four times a day  polyethylene glycol 3350 17 Gram(s) Oral two times a day  senna 2 Tablet(s) Oral at bedtime  sodium chloride 0.9%. 1000 milliLiter(s) IV Continuous <Continuous>  traMADol 50 milliGRAM(s) Oral every 4 hours PRN  traMADol 25 milliGRAM(s) Oral every 4 hours PRN      RADIOLOGY:       Exam:  General: NAD, pt is comfortably sitting up in bed, A&O x3, on RA  HEENT: CN II-XII grossly intact, PERRL 3mm, EOMI b/l, face symmetric, tongue midline, neck FROM  Cardiovascular: RRR, normal S1 and S2   Respiratory: lungs CTAB, no wheezing, rhonchi, or crackles   GI: normoactive BS to auscultation, abd soft, NTND   Neuro: no aphasia, speech clear, no dysmetria, no pronator drift  strength 5/5 throughout all 4 extremities  sensation intact to light touch throughout   Extremities: distal pulses 2+ x4   Wound/incision: L frontal VPS incision site with dressing C/D/I (staples), abdominal incisions with dermabond       Assessment:   63 years old female with left otorrhea admitted from ED with severe left sided headache and intermittent dizzinesss for Left craniotomy for repair of left otorrhea, intra op EVD placement on 05/17/23. Now s/p L VPS (Certas @5) (5/24).       Plan:          Assessment:  Present when checked    []  GCS  E   V  M     Heart Failure: []Acute, [] acute on chronic , []chronic  Heart Failure:  [] Diastolic (HFpEF), [] Systolic (HFrEF), []Combined (HFpEF and HFrEF), [] RHF, [] Pulm HTN, [] Other    [] MARY, [] ATN, [] AIN, [] other  [] CKD1, [] CKD2, [] CKD 3, [] CKD 4, [] CKD 5, []ESRD    Encephalopathy: [] Metabolic, [] Hepatic, [] toxic, [] Neurological, [] Other    Abnormal Nurtitional Status: [] malnurtition (see nutrition note), [ ]underweight: BMI < 19, [] morbid obesity: BMI >40, [] Cachexia    [] Sepsis  [] hypovolemic shock,[] cardiogenic shock, [] hemorrhagic shock, [] neuogenic shock  [] Acute Respiratory Failure  []Cerebral edema, [] Brain compression/ herniation,   [] Functional quadriplegia  [] Acute blood loss anemia       NEUROSURGERY POST OP NOTE:    POD# 0 S/P L frontal VPS Certas @5.     S:   Patient seen and examined at bedside in PACU. Patient complaining of L sided incisional pain and headache, otherwise no complaints at this time.        T(C): 36.3 (05-24-23 @ 14:25), Max: 37.3 (05-24-23 @ 11:30)  HR: 62 (05-24-23 @ 17:25) (51 - 77)  BP: 112/61 (05-24-23 @ 17:25) (104/56 - 151/66)  RR: 24 (05-24-23 @ 17:25) (9 - 24)  SpO2: 98% (05-24-23 @ 17:25) (93% - 100%)      05-23-23 @ 07:01  -  05-24-23 @ 07:00  --------------------------------------------------------  IN: 1680 mL / OUT: 1478 mL / NET: 202 mL    05-24-23 @ 07:01  -  05-24-23 @ 18:18  --------------------------------------------------------  IN: 400 mL / OUT: 464 mL / NET: -64 mL        acetaminophen     Tablet .. 1000 milliGRAM(s) Oral every 8 hours PRN  artificial  tears Solution 1 Drop(s) Right EYE every 4 hours PRN  benzocaine/menthol Lozenge 1 Lozenge Oral every 4 hours PRN  bisacodyl 5 milliGRAM(s) Oral daily PRN  chlorhexidine 2% Cloths 1 Application(s) Topical daily  levETIRAcetam 500 milliGRAM(s) Oral two times a day  methocarbamol 500 milliGRAM(s) Oral every 8 hours PRN  metoclopramide Injectable 10 milliGRAM(s) IV Push every 8 hours PRN  ofloxacin 0.3% Ophthalmic Solution for OTIC Use 4 Drop(s) Left Ear four times a day  polyethylene glycol 3350 17 Gram(s) Oral two times a day  senna 2 Tablet(s) Oral at bedtime  sodium chloride 0.9%. 1000 milliLiter(s) IV Continuous <Continuous>  traMADol 50 milliGRAM(s) Oral every 4 hours PRN  traMADol 25 milliGRAM(s) Oral every 4 hours PRN      RADIOLOGY:   < from: CT Head No Cont (05.24.23 @ 18:08) >  IMPRESSION:    1. Interval placement of left frontal approach  shunt catheter with tip   terminating in the corpus callosum, does NOT appear to be within the   ventricular system. New focus of air within the frontal horn of the right   lateral ventricle. Ventricular size stable since 5/18/2023.    2. Expected currently resolution of small extra-axial blood and gas deep   to craniotomy site.      Exam:  General: NAD, pt is comfortably sitting up in bed, A&O x3, on RA  HEENT: CN II-XII grossly intact, PERRL 3mm, EOMI b/l, face symmetric, tongue midline, neck FROM  Cardiovascular: RRR, normal S1 and S2   Respiratory: lungs CTAB, no wheezing, rhonchi, or crackles   GI: normoactive BS to auscultation, abd soft, NTND   Neuro: no aphasia, speech clear, no dysmetria, no pronator drift  strength 5/5 throughout all 4 extremities  sensation intact to light touch throughout   Extremities: distal pulses 2+ x4   Wound/incision: L frontal VPS incision site with dressing C/D/I (staples), abdominal incisions with dermabond       Assessment:   63 years old female with left otorrhea admitted from ED with severe left sided headache and intermittent dizzinesss for Left craniotomy for repair of left otorrhea, intra op EVD placement on 05/17/23. Now s/p L VPS (Certas @5) (5/24).       Plan:  Neuro:  - Neuro/vitals q4h  - L VPS (Certas @5)   - Pain control prn tramadol, robaxin for jaw pain   - post-op CTH completed, L frontal VPS catheter terminating in the corpus callosum, not in vetricle, ventricular size stable.   - plan for proximal shunt revision tomorrow with Dr. Ramirez, case added-on   - incision checks daily, staple watch, can remove L frontal VPS dressing on POD#2     Cardio:  - SBP<140, qtc 431 5/19   - Restart home losartan 50 if needed   - Statin held for myalgia     Pulm:   - RA    GI:  - ADAT  - Bowel regimen  - NPO after midnight     Renal:  - voiding  - NS @ 80 until tolerating PO diet     Endo:   - ISS     ID:   - ID consulted for empiric meningitis coverage, now off antibiotics   - s/p vanc/cefepime (5/17-5/19)   - periop vanc dose x1 ordered     Heme:  - SCDs for DVT ppx/ SQL held for OR     Misc:   - ofloxacin L eardrop bid    Discussed w/ Dr. Ramirez             Assessment:  Present when checked    []  GCS  E   V  M     Heart Failure: []Acute, [] acute on chronic , []chronic  Heart Failure:  [] Diastolic (HFpEF), [] Systolic (HFrEF), []Combined (HFpEF and HFrEF), [] RHF, [] Pulm HTN, [] Other    [] MARY, [] ATN, [] AIN, [] other  [] CKD1, [] CKD2, [] CKD 3, [] CKD 4, [] CKD 5, []ESRD    Encephalopathy: [] Metabolic, [] Hepatic, [] toxic, [] Neurological, [] Other    Abnormal Nurtitional Status: [] malnurtition (see nutrition note), [ ]underweight: BMI < 19, [] morbid obesity: BMI >40, [] Cachexia    [] Sepsis  [] hypovolemic shock,[] cardiogenic shock, [] hemorrhagic shock, [] neuogenic shock  [] Acute Respiratory Failure  []Cerebral edema, [] Brain compression/ herniation,   [] Functional quadriplegia  [] Acute blood loss anemia

## 2023-05-24 NOTE — PROGRESS NOTE ADULT - ASSESSMENT
ASSESSMENT:  63 years old female with left otorrhea admitted from ED with severe left sided headache and intermittent dizziness for Left craniotomy for repair of left otorrhea, intra op EVD placement on 05/17/23.    Neuro:   Left craniotomy for repair of left otorrhea, intra op EVD placement today  - neuro/vitals q1h  - CT haydee complete 5/16, postop CT haydee completed   EVD management: open @5 cmH20, monitor outputs; ICP goal <20  - Pain control prn tramadol; c/w methocarbamol 500mg q12 for pain during mastication L side since post op    Cardio:  - SBP<140, qtc 431 5/19  - Restart home losartan 50mg if SBP >140 or DBP >80  - Statin held for myalgia     Pulm:   - RA    GI:  - Regular diet   - Bowel regimen    Endo:   a1c 5.3%    Renal:  monitor urine output   check BMP qd     ID:   - ID consulted for empiric meningitis coverage, now monitor off  antibiotics   - s/p vanc/cefepime (5/17-5/19)   - f/u OR cultures (NGTD)  - CSF cell count and culture for VPS preparation  NGTD    Heme:  -SCDs for DVT ppx hold SQ lovenox for post-operative state today     ASSESSMENT:  63 years old female with left otorrhea admitted from ED with severe left sided headache and intermittent dizziness for Left craniotomy for repair of left otorrhea, intra op EVD placement on 05/17/23.    Neuro:   Left craniotomy for repair of left otorrhea, intra op EVD placement today  - neuro/vitals q1h  - CT haydee complete 5/16, postop CT haydee completed   EVD management: open @5 cmH20, monitor outputs; ICP goal <20  - Pain control prn tramadol; c/w methocarbamol 500mg q12 for pain during mastication L side since post op    Cardio:  - SBP<140, qtc 431 5/19  - Restart home losartan 50mg if SBP >140 or DBP >80  - Statin held for myalgia     Pulm:   - RA    GI:  - Regular diet   - Bowel regimen    Endo:   a1c 5.3%    Renal:  monitor urine output   check BMP qd     ID:   - ID consulted for empiric meningitis coverage, now monitor off  antibiotics   - s/p vanc/cefepime (5/17-5/19)   - f/u OR cultures (NGTD)  - CSF cell count and culture for VPS preparation  NGTD    Heme:  -SCDs for DVT ppx hold SQ lovenox for post-operative state today    Dispo: post op stay in pacu then floor
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
## 2023-05-24 NOTE — PROGRESS NOTE ADULT - SUBJECTIVE AND OBJECTIVE BOX
OTOLARYNGOLOGY (ENT) PROGRESS NOTE    PATIENT: MARIA FERNANDA VILLARREAL  MRN: [id number]  : 62  [id number]  DATE OF SERVICE:  23  			           ID:MARIA FERNANDA VILLARREAL is a  63yFemale S/P L EVD placement and L temp crani for CSF leak repair w/ ENT    Subjective/ Interval:   No issues overnight, pain minimal, mastoid soft head compression dressing in place, EVD open at 5cc  ; patient seen bedside, dressings in place, EVD clamped until 7  : NAEON. Patient tmax 100.5, otherwise afebrile and vitals stable. Pain well controlled. Denies headache, nausea/vomiting, or dizziness. GRECIA removed. EVD open at 5. Cultures NGTD.  : NAEON. AFVSS. Pain well controlled. Denies headache, nausea/vomiting, or dizziness. EVD open at 5. Cultures NGTD.  : NAEON. AFVSS. Pain well controlled. She reports some tenderness over incision site. EVD remains in place. Cultures remain NGTD.  : NAEON. AFVSS. Pain well controlled. EVD remains in place. She required oxygen overnight while sleeping, which was administered with nasal cannula. Final OR and CSF cultures negative.  : NAEON. AFVSS. Pain well controlled. Denies headache, nausea/vomiting, or dizziness. EVD open at 5. Receiving ear drops. No new complaints.      ALLERGIES:  penicillins (Anaphylaxis)  dairy products (Angioedema)      MEDICATIONS:  Antiinfectives:     IV fluids:  sodium chloride 0.9%. 1000 milliLiter(s) IV Continuous <Continuous>    Hematologic/Anticoagulation:    Pain medications/Neuro:  acetaminophen     Tablet .. 650 milliGRAM(s) Oral every 6 hours PRN  levETIRAcetam 500 milliGRAM(s) Oral two times a day  methocarbamol 500 milliGRAM(s) Oral every 8 hours  metoclopramide Injectable 10 milliGRAM(s) IV Push every 8 hours PRN  traMADol 50 milliGRAM(s) Oral every 4 hours PRN  traMADol 25 milliGRAM(s) Oral every 4 hours PRN    Endocrine Medications:     All other standing medications:   chlorhexidine 2% Cloths 1 Application(s) Topical daily  ofloxacin 0.3% Ophthalmic Solution for OTIC Use 4 Drop(s) Left Ear four times a day  polyethylene glycol 3350 17 Gram(s) Oral two times a day  senna 2 Tablet(s) Oral at bedtime    All other PRN medications:  artificial  tears Solution 1 Drop(s) Right EYE every 4 hours PRN  benzocaine/menthol Lozenge 1 Lozenge Oral every 4 hours PRN  bisacodyl 5 milliGRAM(s) Oral daily PRN    Vital Signs Last 24 Hrs  T(C): 36.7 (24 May 2023 05:55), Max: 37.7 (23 May 2023 09:03)  T(F): 98.1 (24 May 2023 05:55), Max: 99.8 (23 May 2023 09:03)  HR: 52 (24 May 2023 07:00) (51 - 77)  BP: 118/58 (24 May 2023 07:00) (102/64 - 141/59)  BP(mean): 83 (24 May 2023 07:00) (75 - 96)  RR: 16 (24 May 2023 07:00) (13 - 24)  SpO2: 93% (24 May 2023 07:00) (92% - 98%)    Parameters below as of 24 May 2023 07:00  Patient On (Oxygen Delivery Method): room air       @ 07:  -   @ 07:00  --------------------------------------------------------  IN:    Oral Fluid: 1040 mL    sodium chloride 0.9%: 640 mL  Total IN: 1680 mL    OUT:    External Ventricular Device (mL): 78 mL    Voided (mL): 1400 mL  Total OUT: 1478 mL    Total NET: 202 mL      23 @ 07:01  -  23 @ 07:00  --------------------------------------------------------  IN:  Total IN: 0 mL    OUT:    External Ventricular Device (mL): 78 mL  Total OUT: 78 mL    Total NET: -78 mL        PHYSICAL EXAM:  General: patient seen laying supine in bed in NAD  Neuro: AAOx3, speech clear and fluent, EVD in place  HEENT: EOMI, mastoid soft, incision c/d/i, small edema around superior incision  Pulmonary: regular respirations, non labored, on nasal cannula, removed on rounds this morning   Ext: perfusing well  Skin: warm, dry      LABS                         11.6   5.34  )-----------( 240      ( 24 May 2023 05:30 )             36.5     138  |  104  |  8   ----------------------------<  101<H>  See Note   |  27  |  0.42<L>    Ca    8.6      24 May 2023 05:30  Phos  4.3       Mg     2.1     0524    Coagulation Studies-     PT/INR - ( 23 May 2023 05:35 )   PT: 12.6 sec;   INR: 1.06        PTT - ( 23 May 2023 05:35 )  PTT:32.6 sec    Endocrine Panel-  Calcium, Total Serum: 8.6 mg/dL ( @ 05:30)      MICROBIOLOGY:  Culture - Tissue with Gram Stain (collected 23 @ 16:17)  Source: .Tissue 2. Left Mastoid  Gram Stain (23 @ 22:27):    No organisms seen    Rare WBC's  Final Report (23 @ 09:40):    No growth    Culture - Tissue with Gram Stain (collected 23 @ 16:17)  Source: .Tissue 1. Left Middle Ear Tissue  Gram Stain (23 @ 22:27):    No organisms seen    Rare WBC's  Final Report (23 @ 09:40):    No growth    Culture Results:   No growth to date (23 @ 07:27)  Culture Results:   No growth to date (23 @ 19:30)  Culture Results:   No growth (23 @ 16:17)  Culture Results:   No growth (23 @ 16:17)    Culture - CSF with Gram Stain (collected 23 @ 09:46)  Source: .CSF CSF  Gram Stain (23 @ 12:16):    No organisms seen    No White blood cells    Culture - CSF with Gram Stain (collected 23 @ 07:27)  Source: .CSF CSF  Gram Stain (23 @ 10:34):    No organisms seen    Rare WBC's  Preliminary Report (23 @ 09:58):    No growth to date    Culture - CSF with Gram Stain (collected 23 @ 19:30)  Source: .CSF csf or spec  Gram Stain (23 @ 21:40):    No organisms seen    Rare WBC's  Preliminary Report (23 @ 12:33):    No growth to date    Culture - Tissue with Gram Stain (collected 23 @ 16:17)  Source: .Tissue 2. Left Mastoid  Gram Stain (23 @ 22:27):    No organisms seen    Rare WBC's  Final Report (23 @ 09:40):    No growth    Culture - Tissue with Gram Stain (collected 23 @ 16:17)  Source: .Tissue 1. Left Middle Ear Tissue  Gram Stain (23 @ 22:27):    No organisms seen    Rare WBC's  Final Report (23 @ 09:40):    No growth

## 2023-05-25 ENCOUNTER — TRANSCRIPTION ENCOUNTER (OUTPATIENT)
Age: 63
End: 2023-05-25

## 2023-05-25 LAB
ANION GAP SERPL CALC-SCNC: 11 MMOL/L — SIGNIFICANT CHANGE UP (ref 5–17)
ANION GAP SERPL CALC-SCNC: 9 MMOL/L — SIGNIFICANT CHANGE UP (ref 5–17)
APTT BLD: 28.8 SEC — SIGNIFICANT CHANGE UP (ref 27.5–35.5)
APTT BLD: 30.7 SEC — SIGNIFICANT CHANGE UP (ref 27.5–35.5)
BUN SERPL-MCNC: 7 MG/DL — SIGNIFICANT CHANGE UP (ref 7–23)
BUN SERPL-MCNC: 8 MG/DL — SIGNIFICANT CHANGE UP (ref 7–23)
CALCIUM SERPL-MCNC: 8.5 MG/DL — SIGNIFICANT CHANGE UP (ref 8.4–10.5)
CALCIUM SERPL-MCNC: 8.8 MG/DL — SIGNIFICANT CHANGE UP (ref 8.4–10.5)
CHLORIDE SERPL-SCNC: 103 MMOL/L — SIGNIFICANT CHANGE UP (ref 96–108)
CHLORIDE SERPL-SCNC: 105 MMOL/L — SIGNIFICANT CHANGE UP (ref 96–108)
CO2 SERPL-SCNC: 24 MMOL/L — SIGNIFICANT CHANGE UP (ref 22–31)
CO2 SERPL-SCNC: 25 MMOL/L — SIGNIFICANT CHANGE UP (ref 22–31)
CREAT SERPL-MCNC: 0.52 MG/DL — SIGNIFICANT CHANGE UP (ref 0.5–1.3)
CREAT SERPL-MCNC: 0.54 MG/DL — SIGNIFICANT CHANGE UP (ref 0.5–1.3)
EGFR: 104 ML/MIN/1.73M2 — SIGNIFICANT CHANGE UP
EGFR: 105 ML/MIN/1.73M2 — SIGNIFICANT CHANGE UP
GLUCOSE SERPL-MCNC: 113 MG/DL — HIGH (ref 70–99)
GLUCOSE SERPL-MCNC: 118 MG/DL — HIGH (ref 70–99)
HCT VFR BLD CALC: 35.8 % — SIGNIFICANT CHANGE UP (ref 34.5–45)
HCT VFR BLD CALC: 36.3 % — SIGNIFICANT CHANGE UP (ref 34.5–45)
HGB BLD-MCNC: 11.5 G/DL — SIGNIFICANT CHANGE UP (ref 11.5–15.5)
HGB BLD-MCNC: 11.7 G/DL — SIGNIFICANT CHANGE UP (ref 11.5–15.5)
INR BLD: 1.06 — SIGNIFICANT CHANGE UP (ref 0.88–1.16)
INR BLD: 1.09 — SIGNIFICANT CHANGE UP (ref 0.88–1.16)
MAGNESIUM SERPL-MCNC: 2.1 MG/DL — SIGNIFICANT CHANGE UP (ref 1.6–2.6)
MCHC RBC-ENTMCNC: 28.6 PG — SIGNIFICANT CHANGE UP (ref 27–34)
MCHC RBC-ENTMCNC: 28.7 PG — SIGNIFICANT CHANGE UP (ref 27–34)
MCHC RBC-ENTMCNC: 32.1 GM/DL — SIGNIFICANT CHANGE UP (ref 32–36)
MCHC RBC-ENTMCNC: 32.2 GM/DL — SIGNIFICANT CHANGE UP (ref 32–36)
MCV RBC AUTO: 89.1 FL — SIGNIFICANT CHANGE UP (ref 80–100)
MCV RBC AUTO: 89.2 FL — SIGNIFICANT CHANGE UP (ref 80–100)
NRBC # BLD: 0 /100 WBCS — SIGNIFICANT CHANGE UP (ref 0–0)
NRBC # BLD: 0 /100 WBCS — SIGNIFICANT CHANGE UP (ref 0–0)
PHOSPHATE SERPL-MCNC: 3.2 MG/DL — SIGNIFICANT CHANGE UP (ref 2.5–4.5)
PLATELET # BLD AUTO: 260 K/UL — SIGNIFICANT CHANGE UP (ref 150–400)
PLATELET # BLD AUTO: 292 K/UL — SIGNIFICANT CHANGE UP (ref 150–400)
POTASSIUM SERPL-MCNC: 4 MMOL/L — SIGNIFICANT CHANGE UP (ref 3.5–5.3)
POTASSIUM SERPL-MCNC: 4.1 MMOL/L — SIGNIFICANT CHANGE UP (ref 3.5–5.3)
POTASSIUM SERPL-SCNC: 4 MMOL/L — SIGNIFICANT CHANGE UP (ref 3.5–5.3)
POTASSIUM SERPL-SCNC: 4.1 MMOL/L — SIGNIFICANT CHANGE UP (ref 3.5–5.3)
PROTHROM AB SERPL-ACNC: 12.6 SEC — SIGNIFICANT CHANGE UP (ref 10.5–13.4)
PROTHROM AB SERPL-ACNC: 13 SEC — SIGNIFICANT CHANGE UP (ref 10.5–13.4)
RBC # BLD: 4.02 M/UL — SIGNIFICANT CHANGE UP (ref 3.8–5.2)
RBC # BLD: 4.07 M/UL — SIGNIFICANT CHANGE UP (ref 3.8–5.2)
RBC # FLD: 12.9 % — SIGNIFICANT CHANGE UP (ref 10.3–14.5)
RBC # FLD: 13.1 % — SIGNIFICANT CHANGE UP (ref 10.3–14.5)
SODIUM SERPL-SCNC: 138 MMOL/L — SIGNIFICANT CHANGE UP (ref 135–145)
SODIUM SERPL-SCNC: 139 MMOL/L — SIGNIFICANT CHANGE UP (ref 135–145)
WBC # BLD: 11.17 K/UL — HIGH (ref 3.8–10.5)
WBC # BLD: 11.29 K/UL — HIGH (ref 3.8–10.5)
WBC # FLD AUTO: 11.17 K/UL — HIGH (ref 3.8–10.5)
WBC # FLD AUTO: 11.29 K/UL — HIGH (ref 3.8–10.5)

## 2023-05-25 PROCEDURE — 61781 SCAN PROC CRANIAL INTRA: CPT | Mod: 58

## 2023-05-25 PROCEDURE — 62225 REPLACE/IRRIGATE CATHETER: CPT | Mod: 58

## 2023-05-25 PROCEDURE — 61781 SCAN PROC CRANIAL INTRA: CPT | Mod: AS,58

## 2023-05-25 PROCEDURE — 70450 CT HEAD/BRAIN W/O DYE: CPT | Mod: 26

## 2023-05-25 PROCEDURE — 62225 REPLACE/IRRIGATE CATHETER: CPT | Mod: AS

## 2023-05-25 PROCEDURE — 62223 ESTABLISH BRAIN CAVITY SHUNT: CPT | Mod: AS

## 2023-05-25 DEVICE — SURGIFLO HEMOSTATIC MATRIX KIT: Type: IMPLANTABLE DEVICE | Status: FUNCTIONAL

## 2023-05-25 RX ORDER — METHOCARBAMOL 500 MG/1
500 TABLET, FILM COATED ORAL EVERY 8 HOURS
Refills: 0 | Status: DISCONTINUED | OUTPATIENT
Start: 2023-05-25 | End: 2023-05-26

## 2023-05-25 RX ORDER — ACETAMINOPHEN 500 MG
1000 TABLET ORAL ONCE
Refills: 0 | Status: COMPLETED | OUTPATIENT
Start: 2023-05-25 | End: 2023-05-25

## 2023-05-25 RX ORDER — SENNA PLUS 8.6 MG/1
2 TABLET ORAL AT BEDTIME
Refills: 0 | Status: DISCONTINUED | OUTPATIENT
Start: 2023-05-25 | End: 2023-05-26

## 2023-05-25 RX ORDER — HYDROMORPHONE HYDROCHLORIDE 2 MG/ML
0.5 INJECTION INTRAMUSCULAR; INTRAVENOUS; SUBCUTANEOUS ONCE
Refills: 0 | Status: DISCONTINUED | OUTPATIENT
Start: 2023-05-25 | End: 2023-05-25

## 2023-05-25 RX ORDER — BENZOCAINE AND MENTHOL 5; 1 G/100ML; G/100ML
1 LIQUID ORAL EVERY 4 HOURS
Refills: 0 | Status: DISCONTINUED | OUTPATIENT
Start: 2023-05-25 | End: 2023-05-26

## 2023-05-25 RX ORDER — LEVETIRACETAM 250 MG/1
500 TABLET, FILM COATED ORAL
Refills: 0 | Status: DISCONTINUED | OUTPATIENT
Start: 2023-05-25 | End: 2023-05-26

## 2023-05-25 RX ORDER — SODIUM CHLORIDE 9 MG/ML
1000 INJECTION INTRAMUSCULAR; INTRAVENOUS; SUBCUTANEOUS
Refills: 0 | Status: DISCONTINUED | OUTPATIENT
Start: 2023-05-25 | End: 2023-05-25

## 2023-05-25 RX ORDER — TRAMADOL HYDROCHLORIDE 50 MG/1
50 TABLET ORAL EVERY 4 HOURS
Refills: 0 | Status: DISCONTINUED | OUTPATIENT
Start: 2023-05-25 | End: 2023-05-26

## 2023-05-25 RX ORDER — ONDANSETRON 8 MG/1
4 TABLET, FILM COATED ORAL EVERY 6 HOURS
Refills: 0 | Status: DISCONTINUED | OUTPATIENT
Start: 2023-05-25 | End: 2023-05-26

## 2023-05-25 RX ORDER — VANCOMYCIN HCL 1 G
1250 VIAL (EA) INTRAVENOUS ONCE
Refills: 0 | Status: COMPLETED | OUTPATIENT
Start: 2023-05-25 | End: 2023-05-25

## 2023-05-25 RX ORDER — POLYETHYLENE GLYCOL 3350 17 G/17G
17 POWDER, FOR SOLUTION ORAL
Refills: 0 | Status: DISCONTINUED | OUTPATIENT
Start: 2023-05-25 | End: 2023-05-26

## 2023-05-25 RX ORDER — METOCLOPRAMIDE HCL 10 MG
10 TABLET ORAL EVERY 8 HOURS
Refills: 0 | Status: DISCONTINUED | OUTPATIENT
Start: 2023-05-25 | End: 2023-05-26

## 2023-05-25 RX ORDER — TRAMADOL HYDROCHLORIDE 50 MG/1
25 TABLET ORAL EVERY 4 HOURS
Refills: 0 | Status: DISCONTINUED | OUTPATIENT
Start: 2023-05-25 | End: 2023-05-26

## 2023-05-25 RX ORDER — OFLOXACIN OTIC SOLUTION 3 MG/ML
4 SOLUTION/ DROPS AURICULAR (OTIC)
Refills: 0 | Status: DISCONTINUED | OUTPATIENT
Start: 2023-05-25 | End: 2023-05-26

## 2023-05-25 RX ADMIN — TRAMADOL HYDROCHLORIDE 50 MILLIGRAM(S): 50 TABLET ORAL at 05:18

## 2023-05-25 RX ADMIN — HYDROMORPHONE HYDROCHLORIDE 0.5 MILLIGRAM(S): 2 INJECTION INTRAMUSCULAR; INTRAVENOUS; SUBCUTANEOUS at 12:25

## 2023-05-25 RX ADMIN — TRAMADOL HYDROCHLORIDE 50 MILLIGRAM(S): 50 TABLET ORAL at 22:06

## 2023-05-25 RX ADMIN — TRAMADOL HYDROCHLORIDE 50 MILLIGRAM(S): 50 TABLET ORAL at 23:18

## 2023-05-25 RX ADMIN — OFLOXACIN OTIC SOLUTION 4 DROP(S): 3 SOLUTION/ DROPS AURICULAR (OTIC) at 05:49

## 2023-05-25 RX ADMIN — Medication 166.67 MILLIGRAM(S): at 01:41

## 2023-05-25 RX ADMIN — POLYETHYLENE GLYCOL 3350 17 GRAM(S): 17 POWDER, FOR SOLUTION ORAL at 22:07

## 2023-05-25 RX ADMIN — Medication 400 MILLIGRAM(S): at 11:33

## 2023-05-25 RX ADMIN — CHLORHEXIDINE GLUCONATE 1 APPLICATION(S): 213 SOLUTION TOPICAL at 05:55

## 2023-05-25 RX ADMIN — LEVETIRACETAM 500 MILLIGRAM(S): 250 TABLET, FILM COATED ORAL at 18:50

## 2023-05-25 RX ADMIN — TRAMADOL HYDROCHLORIDE 50 MILLIGRAM(S): 50 TABLET ORAL at 04:10

## 2023-05-25 RX ADMIN — SODIUM CHLORIDE 80 MILLILITER(S): 9 INJECTION INTRAMUSCULAR; INTRAVENOUS; SUBCUTANEOUS at 01:02

## 2023-05-25 RX ADMIN — Medication 166.67 MILLIGRAM(S): at 20:41

## 2023-05-25 RX ADMIN — LEVETIRACETAM 500 MILLIGRAM(S): 250 TABLET, FILM COATED ORAL at 05:49

## 2023-05-25 RX ADMIN — SENNA PLUS 2 TABLET(S): 8.6 TABLET ORAL at 22:06

## 2023-05-25 NOTE — BRIEF OPERATIVE NOTE - OPERATION/FINDINGS
Left frontal ventricular catheter revision of ventriculoperitoneal shunt. (valve not change, certas at 5)

## 2023-05-25 NOTE — PROGRESS NOTE ADULT - SUBJECTIVE AND OBJECTIVE BOX
/SUBJECTIVE: Patient seen and examined at bedside.  There were no acute events overnight.  Patient endorses head pain (7/10), abdominal pain (6/10), and voiding without complication.  She denies nausea, vomiting, passing flatus and stooling thus far.       vancomycin  IVPB 1250 milliGRAM(s) IV Intermittent once    MEDICATIONS  (PRN):  artificial  tears Solution 1 Drop(s) Right EYE every 4 hours PRN Dry Eyes  benzocaine/menthol Lozenge 1 Lozenge Oral every 4 hours PRN Sore Throat  bisacodyl 5 milliGRAM(s) Oral daily PRN Constipation  methocarbamol 500 milliGRAM(s) Oral every 8 hours PRN muscle/jaw pain  metoclopramide Injectable 10 milliGRAM(s) IV Push every 8 hours PRN nausea/vomiting 2nd line  ondansetron Injectable 4 milliGRAM(s) IV Push every 6 hours PRN Nausea and/or Vomiting  traMADol 50 milliGRAM(s) Oral every 4 hours PRN Severe Pain (7 - 10)  traMADol 25 milliGRAM(s) Oral every 4 hours PRN Moderate Pain (4 - 6)      I&O's Detail    24 May 2023 07:01  -  25 May 2023 07:00  --------------------------------------------------------  IN:    IV PiggyBack: 250 mL    Oral Fluid: 280 mL    sodium chloride 0.9%: 400 mL    sodium chloride 0.9%: 960 mL  Total IN: 1890 mL    OUT:    External Ventricular Device (mL): 14 mL    Voided (mL): 650 mL  Total OUT: 664 mL    Total NET: 1226 mL          T(C): 37.3 (05-25-23 @ 08:15), Max: 37.3 (05-24-23 @ 22:09)  HR: 60 (05-25-23 @ 12:22) (51 - 83)  BP: 125/65 (05-25-23 @ 12:22) (108/63 - 156/78)  RR: 15 (05-25-23 @ 12:22) (9 - 24)  SpO2: 99% (05-25-23 @ 12:22) (94% - 100%)    GENERAL: NAD, Resting comfortably in bed, awake, opens eyes spontaneously  HEENT: Left cranial surgery incision sutured closed, MMM, Normal conjunctiva  RESP: Nonlabored breathing on room air, No respiratory distress  CARD: Normal rate, Normal peripheral perfusion  GI: Soft, ND, mild RUQ tenderness, No guarding, No rebound tenderness, laparoscopic incisions with DermaBond are negative for discharge, and surrounding erythema  EXTREM: WWP, No edema, No gross deformity of extremities  SKIN: No rashes, laparoscopic incisions are clean/dry/intact  NEURO: Awake and alert, No focal motor or sensory deficits  PSYCH: Affect and characteristics of appearance, verbalizations, and behaviors are appropriate    LABS:                        11.5   11.17 )-----------( 260      ( 25 May 2023 11:56 )             35.8     05-25    139  |  105  |  7   ----------------------------<  118<H>  4.1   |  25  |  0.54    Ca    8.8      25 May 2023 11:56  Phos  3.2     05-25  Mg     2.1     05-25      PT/INR - ( 25 May 2023 11:56 )   PT: 12.6 sec;   INR: 1.06          PTT - ( 25 May 2023 11:56 )  PTT:28.8 sec      RADIOLOGY & ADDITIONAL STUDIES:      Culture - CSF with Gram Stain (collected 05-23-23 @ 09:46)  Source: .CSF CSF  Gram Stain (05-23-23 @ 12:16):    No organisms seen    No White blood cells  Preliminary Report (05-24-23 @ 10:45):    No growth to date    Culture - CSF with Gram Stain (collected 05-22-23 @ 07:27)  Source: .CSF CSF  Gram Stain (05-22-23 @ 10:34):    No organisms seen    Rare WBC's  Preliminary Report (05-23-23 @ 09:58):    No growth to date

## 2023-05-25 NOTE — PROGRESS NOTE ADULT - SUBJECTIVE AND OBJECTIVE BOX
Surgery: Proximal revision of L frontal VPS  Surgeon: Dr. Ramirez  Consent: Signed by patient     Representative Consent: [  x ] Signed by patient vs HCP                                                 [  ] N/A     penicillins (Anaphylaxis)  dairy products (Angioedema)    OVERNIGHT EVENTS: POD 0 EVD removal and left ventriculoperitoneal shunt placement (Certas @ 5). post op CTH showing tip of catheter in corpus callosum, not ventricle, repeat CTH Terrence done and prelim stable, neurologically stable, pre-op and consented for Revision of proximal L frontal VPS.    5/25: POD 8, POD 1 L VPS placement, pending OR for Revision of proximal L frontal VPS. Endorsed abdominal pain likely incisional/positional, keep eye    Hospital Course:   5/16: Admitted for OR tomorrow. CT terrence complete. S/p L EVD placement and L temp crani for CSF leak repair w/ ENT.   5/17: POD0 Left craniotomy for repair of left otorrhea, intra op EVD placement on 05/17/23. Empiric meningitis coverage started, f/u ID recs.   5/18: POD1, KAMAR overnight. Tramadol started for incisional pain. CT terrence completed today. Flagyl dc'd, cefepime added. Oliveira removed, f/u TOV.  5/19: POD2, kamar overnight. Peripheral IV placed. SG GRECIA d/c. abx d/c per ID. Pt endorsed CP, non-radiating. EKG unremarkable and cardiac enzymes WNL. SQL started.   5/20: POD3, given 500 cc bolus. EVD output WNL. Given 1L bolus.  5/21: POD 4. EVD @ 5. Neuro stable. [place] removed. Patient complaining of clear otorrhea from L ear, none visualized. ENT contacted and reassured mild leakage is normal d/t how ear is packed upon closing, and to notify if worsens.   5/22: POD 5. EVD @ 5. Neuro stable. Robaxin started for left jaw muscle spasm. CSF profile sent for preop.  5/23: POD 6. EVD @ 5. KAMAR overnight, neuro stable. Pre-op for VPS. SQL held for OR.   5/24: POD 7. POD 0 EVD removal and left ventriculoperitoneal shunt placement (Certas @ 5). post op CTH showing tip of catheter in corpus callosum, not ventricle, repeat CTH Terrence done and prelim stable, neurologically stable, pre-op and consented for Revision of proximal L frontal VPS.    5/25: POD 8, POD 1 L VPS placement, pending OR for Revision of proximal L frontal VPS. Endorsed abdominal pain likely incisional/positional, keep eye      T(C): 37.3 (05-24-23 @ 22:09), Max: 37.3 (05-24-23 @ 11:30)  HR: 82 (05-24-23 @ 23:49) (51 - 82)  BP: 139/63 (05-24-23 @ 23:49) (104/56 - 151/66)  RR: 18 (05-24-23 @ 23:49) (9 - 24)  SpO2: 94% (05-24-23 @ 23:49) (93% - 100%)  Wt(kg): --    EXAM:  General: NAD, pt is comfortably sitting up in bed, A&O x3, on RA  HEENT: CN II-XII grossly intact, PERRL 3mm, EOMI b/l, face symmetric, tongue midline, neck FROM  Cardiovascular: RRR, normal S1 and S2   Respiratory: lungs CTAB, no wheezing, rhonchi, or crackles   GI: normoactive BS to auscultation, abd soft, NTND   Neuro: no aphasia, speech clear, no dysmetria, no pronator drift  strength 5/5 throughout all 4 extremities  sensation intact to light touch throughout   Extremities: distal pulses 2+ x4   Wound/incision: L frontal VPS incision site with dressing C/D/I (staples), abdominal incisions with dermabond     05-24    138  |  104  |  8   ----------------------------<  101<H>  See Note   |  27  |  0.42<L>    Ca    8.6      24 May 2023 05:30  Phos  4.3     05-24  Mg     2.1     05-24      CBC Full  -  ( 24 May 2023 05:30 )  WBC Count : 5.34 K/uL  RBC Count : 4.10 M/uL  Hemoglobin : 11.6 g/dL  Hematocrit : 36.5 %  Platelet Count - Automated : 240 K/uL  Mean Cell Volume : 89.0 fl  Mean Cell Hemoglobin : 28.3 pg  Mean Cell Hemoglobin Concentration : 31.8 gm/dL  Auto Neutrophil # : x  Auto Lymphocyte # : x  Auto Monocyte # : x  Auto Eosinophil # : x  Auto Basophil # : x  Auto Neutrophil % : x  Auto Lymphocyte % : x  Auto Monocyte % : x  Auto Eosinophil % : x  Auto Basophil % : x    PT/INR - ( 23 May 2023 05:35 )   PT: 12.6 sec;   INR: 1.06          PTT - ( 23 May 2023 05:35 )  PTT:32.6 sec    Pregnancy test? (serum hcg for any female < 57 y/o) (within 48hrs): [ ] Negative Result  [ ] Positive Result  [ x] N/A : male or female > 57 y/o    COVID swab? (within 48hrs): _ Y  _ N  _x_N/a    Have there been 2 T&S during this admission?  _x Y  _ N  Is there an active T&S within 72hrs?  _ Y  _ N    Last dose of antiplatelet/anticoagulation drug?    3M nasal swab ordered?  _ Y  _ N    Cranial surgery: Order written for hair to be shampooed night before surgery and morning before surgery? _ Y  _ N  Chlorhexidine Wipes ordered for neck down?  _ Y  _ N  (twice a day if 1 day before surgery, daily for 3 days if 3 days prior, daily if in ICU)    Implanted Devices (pacemaker, drug pump...etc):   _ Y  _ N           If YES --> EPS consulted to interrogate device: _ Y  _ N           If YES --> EPS called to let them know patient going for surgery:                             [ ] device needs to be turned off                               [ ] magnet needs to be placed for surgery                              [ ] nothing to do per EP, may proceed with cautery use in OR                                       CXR: < from: Xray Chest 1 View- PORTABLE-Routine (05.18.23 @ 05:32) >  IMPRESSION:  No acute pathology.  EKG:  ECHO:  Medical Clearances: ICU      < end of copied text >                    Assessment/Plan:  63 years old female with left otorrhea admitted from ED with severe left sided headache and intermittent dizzinesss for Left craniotomy for repair of left otorrhea, intra op EVD placement on 05/17/23. Now s/p L VPS (Certas @5) (5/24).     Neuro:  - Neuro/vitals q4h  - L VPS (Certas @5)   - keppra 500mg BID   - Pain control prn tramadol, robaxin for jaw pain   - post-op CTH completed, L frontal VPS catheter terminating in the corpus callosum, not in vetricle, ventricular size stable.   - CTH Terrence 5/24 for VPS revision planning complete and stable  - plan for proximal shunt revision tomorrow with Dr. Ramirez, case added-on   - incision checks daily, staple watch, can remove L frontal VPS dressing on POD#2     Cardio:  - SBP<140, qtc 431 5/19   - Restart home losartan 50 if needed   - Statin held for myalgia     Pulm:   - RA    GI:  - ADAT  - Bowel regimen  - NPO after midnight     Renal:  - voiding  - NS @ 80 until tolerating PO diet     Endo:   - ISS     ID:   - ID consulted for empiric meningitis coverage, now off antibiotics   - s/p vanc/cefepime (5/17-5/19)   - periop vanc dose x1 ordered     Heme:  - SCDs for DVT ppx/ SQL held for OR     Misc:   - ofloxacin L eardrop bid    Discussed w/ Dr. Ramirez       - OR tomorrow for Proximal revision of L frontal   - NPO after midnight (except meds)  - IVF at midnight  - Hold all AC  - 1u PRBCs on hold for OR   - 3M nasal swab, chlorhexidine wipes - ordered (no shampoo cap due to fresh incision)  - F/u labs, T&S  - Preop imaging studies performed   - Consent in chart   - Medically optimized for OR, per    - D/w Dr. Ramirez    Assessment:  Present when checked    []  GCS  E   V  M     Heart Failure: []Acute, [] acute on chronic , []chronic  Heart Failure:  [] Diastolic (HFpEF), [] Systolic (HFrEF), []Combined (HFpEF and HFrEF), [] RHF, [] Pulm HTN, [] Other    [] MARY, [] ATN, [] AIN, [] other  [] CKD1, [] CKD2, [] CKD 3, [] CKD 4, [] CKD 5, []ESRD    Encephalopathy: [] Metabolic, [] Hepatic, [] toxic, [] Neurological, [] Other    Abnormal Nurtitional Status: [] malnurtition (see nutrition note), [ ]underweight: BMI < 19, [] morbid obesity: BMI >40, [] Cachexia    [] Sepsis  [] hypovolemic shock,[] cardiogenic shock, [] hemorrhagic shock, [] neuogenic shock  [] Acute Respiratory Failure  []Cerebral edema, [] Brain compression/ herniation,   [] Functional quadriplegia  [] Acute blood loss anemia

## 2023-05-25 NOTE — DISCHARGE NOTE PROVIDER - NSDCMRMEDTOKEN_GEN_ALL_CORE_FT
losartan 50 mg oral tablet: 1 orally once a day (at bedtime)  rosuvastatin 40 mg oral capsule: 1 orally once a day   losartan 50 mg oral tablet: 1 orally once a day (at bedtime)  ocular lubricant ophthalmic solution: 1 drop(s) to each affected eye every 4 hours As needed Dry Eyes  ofloxacin 0.3% otic solution: 4 drop(s) to each affected ear 4 times a day  polyethylene glycol 3350 oral powder for reconstitution: 17 gram(s) orally 2 times a day as needed for  constipation  rosuvastatin 40 mg oral capsule: 1 orally once a day  traMADol 50 mg oral tablet: 1 tab(s) orally every 6 hours as needed for Severe Pain (7 - 10) MDD: 4 tabs

## 2023-05-25 NOTE — DISCHARGE NOTE PROVIDER - NSDCFUSCHEDAPPT_GEN_ALL_CORE_FT
Katherine Miles  [facility]  NEUROSURG [id number]  Scheduled Appointment: 06/06/2023

## 2023-05-25 NOTE — PROVIDER CONTACT NOTE (OTHER) - SITUATION
Pt insisted on removing all IV accesses. Pt stated "I don't want any needles on me". Tried to explain her the importance of it. Pt rejected. IV were removed.
Pt temp 100.5
EVD output 20 cc from [id number]
BG 93
EVD output 21cc for 3 am hour
EVD output 24 from [id number]
Pt complaining if 8/10 head pain. & not due for Dilaudid for another 2 hours
EVD output 22 for 7 am hour
EVD output for 0000 was 26 cc
EVD output @ 20cc w/ 15 minutes remaining for the hour

## 2023-05-25 NOTE — DISCHARGE NOTE PROVIDER - NSDCCPCAREPLAN_GEN_ALL_CORE_FT
PRINCIPAL DISCHARGE DIAGNOSIS  Diagnosis: Otorrhea, left  Assessment and Plan of Treatment: s/p left temporal craniotomy for CSF leak repair with EVD placement, s/p VPS, s/p VPS revision      SECONDARY DISCHARGE DIAGNOSES  Diagnosis: Vertigo  Assessment and Plan of Treatment:     Diagnosis: Otorrhea, left  Assessment and Plan of Treatment:     Diagnosis: Hypertension  Assessment and Plan of Treatment:     Diagnosis: Hyperlipidemia  Assessment and Plan of Treatment:      PRINCIPAL DISCHARGE DIAGNOSIS  Diagnosis: Otorrhea, left  Assessment and Plan of Treatment: s/p left temporal craniotomy for CSF leak repair with EVD placement, s/p VPS, s/p VPS revision (Certas @5), Follow up with Dr. Ramirez outpatient      SECONDARY DISCHARGE DIAGNOSES  Diagnosis: Vertigo  Assessment and Plan of Treatment:     Diagnosis: Otorrhea, left  Assessment and Plan of Treatment:     Diagnosis: Hypertension  Assessment and Plan of Treatment:     Diagnosis: Hyperlipidemia  Assessment and Plan of Treatment:      PRINCIPAL DISCHARGE DIAGNOSIS  Diagnosis: Otorrhea, left  Assessment and Plan of Treatment: s/p left temporal craniotomy for CSF leak repair with EVD placement, s/p VPS, s/p VPS revision (Certas @5), Follow up with Dr. Ramirez outpatient      SECONDARY DISCHARGE DIAGNOSES  Diagnosis: Vertigo  Assessment and Plan of Treatment:     Diagnosis: Otorrhea, left  Assessment and Plan of Treatment: continue Ofloxacin ear drops to L ear, follow up with ENT    Diagnosis: Hypertension  Assessment and Plan of Treatment: continue home Losartan    Diagnosis: Hyperlipidemia  Assessment and Plan of Treatment: constinue home statin

## 2023-05-25 NOTE — DISCHARGE NOTE PROVIDER - NSDCCPTREATMENT_GEN_ALL_CORE_FT
PRINCIPAL PROCEDURE  Procedure: Repair, CSF leak  Findings and Treatment: Left temporal craniotomy for repair fo CSF leak with EVD placement      SECONDARY PROCEDURE  Procedure:  shunt  Findings and Treatment:     Procedure: Insertion or replacement of external ventricular drain (EVD)  Findings and Treatment:      PRINCIPAL PROCEDURE  Procedure: Repair, CSF leak  Findings and Treatment: Left temporal craniotomy for repair fo CSF leak with EVD placement      SECONDARY PROCEDURE  Procedure: Insertion or replacement of external ventricular drain (EVD)  Findings and Treatment:     Procedure:  shunt  Findings and Treatment: Kelvin at 5

## 2023-05-25 NOTE — DISCHARGE NOTE PROVIDER - HOSPITAL COURSE
HPI:  HPI: 63  female comes to ER with complaint of severe left side head/face pain, intermittent dizziness, also intermittent left CSF otorrhea. She has hx of left cholesteatoma, chronic left mastoiditis  She had an eustachian ventilating tube placed in 2020 (?) which was removed about 1 month ago. She admits that since removal of  tube she is having CSF leakage from left ear and pressure like left side headache.  She states that today she had severe left side headache and left facial like pain and decided to come to ER. She requested to  speak  with Dr. Ramirez who is following her for CSF leak.   CT head is negative for acute pathology; no hemorrhage, no stroke, no hydrocephalus    Hospital Course:  5/16: Admitted for OR tomorrow. CT haydee complete. S/p L EVD placement and L temp crani for CSF leak repair w/ ENT.   5/17: POD0 Left craniotomy for repair of left otorrhea, intra op EVD placement on 05/17/23. Empiric meningitis coverage started, f/u ID recs.   5/18: POD1, KAMAR overnight. Tramadol started for incisional pain. CT haydee completed today. Flagyl dc'd, cefepime added. Oliveira removed, f/u TOV.  5/19: POD2, kamar overnight. Peripheral IV placed. SG GRECIA d/c. abx d/c per ID. Pt endorsed CP, non-radiating. EKG unremarkable and cardiac enzymes WNL. SQL started.   5/20: POD3, given 500 cc bolus. EVD output WNL. Given 1L bolus.  5/21: POD 4. EVD @ 5. Neuro stable. Yennifer removed. Patient complaining of clear otorrhea from L ear, none visualized. ENT contacted and reassured mild leakage is normal d/t how ear is packed upon closing, and to notify if worsens.   5/22: POD 5. EVD @ 5. Neuro stable. Robaxin started for left jaw muscle spasm. CSF profile sent for preop.  5/23: POD 6. EVD @ 5. KAMAR overnight, neuro stable.    Patient evaluated by PT/OT who recommended:  Patient is going home? rehab? hospice? Facility Name:     Hospital course c/b:     Exam on day of discharge:    Checklist:   - Obtained follow up appointment from NP  - Reviewed final recommendations of inpatient consults  - review discharge planning on provider handoff  - post op imaging completed  - Neurologically stable for discharge  - Vitals stable for discharge   - Afebrile for discharge  - WBC is stable  - Sodium level is normal  - Pain is adequately controlled  - Pt has PICC/walker/brace/collar   - LACE score (10 or > needs PCP apt)   - stroke patient? Discharge NIHSS score   HPI:  HPI: 63  female comes to ER with complaint of severe left side head/face pain, intermittent dizziness, also intermittent left CSF otorrhea. She has hx of left cholesteatoma, chronic left mastoiditis  She had an eustachian ventilating tube placed in 2020 (?) which was removed about 1 month ago. She admits that since removal of  tube she is having CSF leakage from left ear and pressure like left side headache.  She states that today she had severe left side headache and left facial like pain and decided to come to ER. She requested to  speak  with Dr. Ramirez who is following her for CSF leak.   CT head is negative for acute pathology; no hemorrhage, no stroke, no hydrocephalus    Hospital Course:  5/16: Admitted for OR tomorrow. CT terrence complete. S/p L EVD placement and L temp crani for CSF leak repair w/ ENT.   5/17: POD0 Left craniotomy for repair of left otorrhea, intra op EVD placement on 05/17/23. Empiric meningitis coverage started, f/u ID recs.   5/18: POD1, KAMAR overnight. Tramadol started for incisional pain. CT terrnece completed today. Flagyl dc'd, cefepime added. Oliveira removed, f/u TOV.  5/19: POD2, kamar overnight. Peripheral IV placed. SG GRECIA d/c. abx d/c per ID. Pt endorsed CP, non-radiating. EKG unremarkable and cardiac enzymes WNL. SQL started.   5/20: POD3, given 500 cc bolus. EVD output WNL. Given 1L bolus.  5/21: POD 4. EVD @ 5. Neuro stable. Yennifer removed. Patient complaining of clear otorrhea from L ear, none visualized. ENT contacted and reassured mild leakage is normal d/t how ear is packed upon closing, and to notify if worsens.   5/22: POD 5. EVD @ 5. Neuro stable. Robaxin started for left jaw muscle spasm. CSF profile sent for preop.  5/23: POD 6. EVD @ 5. KAMAR overnight, neuro stable.  5/24: POD 7. POD 0 EVD removal and left ventriculoperitoneal shunt placement (Certas @ 5). post op CTH showing tip of catheter in corpus callosum, not ventricle, repeat CTH Terrence done and prelim stable, neurologically stable, pre-op and consented for Revision of proximal L frontal VPS.    5/25: POD 8, POD 1 L VPS placement, POD0 VPS proximal revision.   5/26: POD 9, POD 2 L VPS placement, POD1 VPS proximal revision, neuro stable, patient pulled out IVs and refused placement unless in an emergency. Pt educated on the risks. Pt is hemodynamically stable, vital signs stable.      Patient evaluated by PT/OT who recommended: Home   Patient is going home    Hospital course uncomplicated    Exam on day of discharge:  General: Pt is laying comfortably in bed, in NAD, on RA  HEENT: PERRL 3mm, EOMI B/L, face symmetric, tongue midline, neck FROM  Cardiovascular: RRR, normal S1 and S2   Respiratory: non-labored breathing, symmetric chest rise   GI: abd soft, NTND   Neuro: A&O x 3, no aphasia, speech clear, no dysmetria, no pronator drift  Strength 5/5 throughout all 4 extremities  Sensation intact to light touch throughout   Vascular: Distal pulses 2+ x4, no calf edema or erythema  Wounds: L VPS wound dressing C/D/I    Patient is neuro stable, vitals stable, afebrile, medically ready for discharge    HPI:  HPI: 63  female comes to ER with complaint of severe left side head/face pain, intermittent dizziness, also intermittent left CSF otorrhea. She has hx of left cholesteatoma, chronic left mastoiditis  She had an eustachian ventilating tube placed in 2020 (?) which was removed about 1 month ago. She admits that since removal of  tube she is having CSF leakage from left ear and pressure like left side headache.  She states that today she had severe left side headache and left facial like pain and decided to come to ER. She requested to  speak  with Dr. Ramirez who is following her for CSF leak.   CT head is negative for acute pathology; no hemorrhage, no stroke, no hydrocephalus    Hospital Course:  5/16: Admitted for OR tomorrow. CT terrence complete. S/p L EVD placement and L temp crani for CSF leak repair w/ ENT.   5/17: POD0 Left craniotomy for repair of left otorrhea, intra op EVD placement on 05/17/23. Empiric meningitis coverage started, f/u ID recs.   5/18: POD1, KAMAR overnight. Tramadol started for incisional pain. CT terrence completed today. Flagyl dc'd, cefepime added. Oliveira removed, f/u TOV.  5/19: POD2, kamar overnight. Peripheral IV placed. SG GRECIA d/c. abx d/c per ID. Pt endorsed CP, non-radiating. EKG unremarkable and cardiac enzymes WNL. SQL started.   5/20: POD3, given 500 cc bolus. EVD output WNL. Given 1L bolus.  5/21: POD 4. EVD @ 5. Neuro stable. Yennifer removed. Patient complaining of clear otorrhea from L ear, none visualized. ENT contacted and reassured mild leakage is normal d/t how ear is packed upon closing, and to notify if worsens.   5/22: POD 5. EVD @ 5. Neuro stable. Robaxin started for left jaw muscle spasm. CSF profile sent for preop.  5/23: POD 6. EVD @ 5. KAMAR overnight, neuro stable.  5/24: POD 7. POD 0 EVD removal and left ventriculoperitoneal shunt placement (Certas @ 5). post op CTH showing tip of catheter in corpus callosum, not ventricle, repeat CTH Terrence done and prelim stable, neurologically stable, pre-op and consented for Revision of proximal L frontal VPS.    5/25: POD 8, POD 1 L VPS placement, POD0 VPS proximal revision.   5/26: POD 9, POD 2 L VPS placement, POD1 VPS proximal revision, neuro stable, patient pulled out IVs and refused placement unless in an emergency. Pt educated on the risks. Pt is hemodynamically stable, vital signs stable.      Patient evaluated by PT/OT who recommended: Home   Patient is going home    Hospital course uncomplicated    Exam on day of discharge:  General: Pt is laying comfortably in bed, in NAD, on RA  HEENT: PERRL 3mm, EOMI B/L, face symmetric, tongue midline, neck FROM  Cardiovascular: RRR, normal S1 and S2   Respiratory: non-labored breathing, symmetric chest rise   GI: abd soft, NTND   Neuro: A&O x 3, no aphasia, speech clear, no dysmetria, no pronator drift  Strength 5/5 throughout all 4 extremities  Sensation intact to light touch throughout   Vascular: Distal pulses 2+ x4, no calf edema or erythema  Wounds: L VPS wound dressing C/D/I    Patient is neuro stable, vitals stable, afebrile, medically ready for discharge    HPI:  HPI: 63  female comes to ER with complaint of severe left side head/face pain, intermittent dizziness, also intermittent left CSF otorrhea. She has hx of left cholesteatoma, chronic left mastoiditis  She had an eustachian ventilating tube placed in 2020 (?) which was removed about 1 month ago. She admits that since removal of  tube she is having CSF leakage from left ear and pressure like left side headache.  She states that today she had severe left side headache and left facial like pain and decided to come to ER. She requested to  speak  with Dr. Ramirez who is following her for CSF leak.   CT head is negative for acute pathology; no hemorrhage, no stroke, no hydrocephalus    Hospital Course:  5/16: Admitted for OR tomorrow. CT terrence complete. S/p L EVD placement and L temp crani for CSF leak repair w/ ENT.   5/17: POD0 Left craniotomy for repair of left otorrhea, intra op EVD placement on 05/17/23. Empiric meningitis coverage started, f/u ID recs.   5/18: POD1, KAMAR overnight. Tramadol started for incisional pain. CT terrence completed today. Flagyl dc'd, cefepime added. Oliveira removed, f/u TOV.  5/19: POD2, kamar overnight. Peripheral IV placed. SG GRECIA d/c. abx d/c per ID. Pt endorsed CP, non-radiating. EKG unremarkable and cardiac enzymes WNL. SQL started.   5/20: POD3, given 500 cc bolus. EVD output WNL. Given 1L bolus.  5/21: POD 4. EVD @ 5. Neuro stable. Yennifer removed. Patient complaining of clear otorrhea from L ear, none visualized. ENT contacted and reassured mild leakage is normal d/t how ear is packed upon closing, and to notify if worsens.   5/22: POD 5. EVD @ 5. Neuro stable. Robaxin started for left jaw muscle spasm. CSF profile sent for preop.  5/23: POD 6. EVD @ 5. KAMAR overnight, neuro stable.  5/24: POD 7. POD 0 EVD removal and left ventriculoperitoneal shunt placement (Certas @ 5). post op CTH showing tip of catheter in corpus callosum, not ventricle, repeat CTH Terrence done and prelim stable, neurologically stable, pre-op and consented for Revision of proximal L frontal VPS.    5/25: POD 8, POD 1 L VPS placement, POD0 VPS proximal revision.   5/26: POD 9, POD 2 L VPS placement, POD1 VPS proximal revision, neuro stable, patient pulled out IVs and refused placement unless in an emergency. Pt educated on the risks. Pt is hemodynamically stable, vital signs stable.      Patient evaluated by PT/OT who recommended: Home with home PT/OT  Patient is going home    Hospital course uncomplicated    Exam on day of discharge:  General: Pt is laying comfortably in bed, in NAD, on RA  HEENT: PERRL 3mm, EOMI B/L, face symmetric, tongue midline, neck FROM  Cardiovascular: RRR, normal S1 and S2   Respiratory: non-labored breathing, symmetric chest rise   GI: abd soft, NTND   Neuro: A&O x 3, no aphasia, speech clear, no dysmetria, no pronator drift  Strength 5/5 throughout all 4 extremities  Sensation intact to light touch throughout   Vascular: Distal pulses 2+ x4, no calf edema or erythema  Wounds: L VPS wound dressing C/D/I    Patient is neuro stable, vitals stable, afebrile, medically ready for discharge

## 2023-05-25 NOTE — PROGRESS NOTE ADULT - ASSESSMENT
63-year-old female w/ PMHx of HTN, HLD, CSF otorrhea, chronic serous otitis media, osteoma s/p L myringotomy tube (2006, 2018), nasal polyps s/p polypectomy (2013), and appendectomy (1976) presented with L-sided headache and dizziness 2/2 L otorrhea.  Patient is POD 1 s/p external ventricular drain and ventriculoperitoneal shunt placement.  There were no acute events overnight and patient denies nausea/vomiting; however, endorses head/abdominal pain and absence of return of bowel function.  She is scheduled to return to the OR for shunt placement adjustment into the ventricles today.  Patient is afebrile and normotensive; and serum results are notable for mild Alk Phos elevation (163).  Physical examination with mild RUQ tenderness.        PLAN  - Monitor for return of bowel function  - Surgery Team 4C will continue to follow  - Please page Team 4 at [phone] with any questions and/or clinical changes   63-year-old female w/ PMHx of HTN, HLD, CSF otorrhea, chronic serous otitis media, osteoma s/p L myringotomy tube (2006, 2018), nasal polyps s/p polypectomy (2013), and appendectomy (1976) presented with L-sided headache and dizziness 2/2 L otorrhea.  Patient is POD 1 s/p external ventricular drain and ventriculoperitoneal shunt placement.  There were no acute events overnight and patient denies nausea/vomiting; however, endorses head/abdominal pain and absence of return of bowel function.  She is scheduled to return to the OR for shunt revision today.  Patient is afebrile and normotensive; and serum results are notable for mild Alk Phos elevation (163).  Physical examination with mild RUQ tenderness.        PLAN  - Monitor for return of bowel function  - Surgery Team 4C will continue to follow  - Please page Team 4 at [phone] with any questions and/or clinical changes

## 2023-05-25 NOTE — BRIEF OPERATIVE NOTE - NSICDXBRIEFPOSTOP_GEN_ALL_CORE_FT
POST-OP DIAGNOSIS:  CSF otorrhea 17-May-2023 13:11:01  Yojana Forrester  Tegmen defect of base of skull 17-May-2023 13:11:10  Yojana Forrester  
POST-OP DIAGNOSIS:  CSF otorrhea 17-May-2023 13:11:01  Yojana Forrester  Tegmen defect of base of skull 17-May-2023 13:11:10  Yojana Forrester  
POST-OP DIAGNOSIS:  CSF otorrhea 17-May-2023 13:11:01  Yojana Forrester  
POST-OP DIAGNOSIS:  CSF otorrhea 17-May-2023 13:11:01  Yojana Forrester  Tegmen defect of base of skull 17-May-2023 13:11:10  Yojana Forrester

## 2023-05-25 NOTE — BRIEF OPERATIVE NOTE - NSICDXBRIEFPREOP_GEN_ALL_CORE_FT
PRE-OP DIAGNOSIS:  CSF otorrhea 17-May-2023 13:10:52  Yojana Forrester  Tegmen defect of base of skull 17-May-2023 13:11:38  Yojana Forrester  
PRE-OP DIAGNOSIS:  CSF otorrhea 17-May-2023 13:10:52  Yojana Forrester

## 2023-05-25 NOTE — PROVIDER CONTACT NOTE (OTHER) - ACTION/TREATMENT ORDERED:
Clamp EVD for 30 minutes and then re-open
Clamp EVD for now. Team will come assess pt during rounds
Clamp EVD until 0500
Encourage pt to drink more. Pt provided with JAVIER
Give next dose of PRN Dilaudid early
Keep EVD clamped until 0700
PA is aware of the event.
PRN Tylenol 650mg PO given. Will continue to monitor. No further interventions.
Clamp EVD until 0030.
Clamp EVD until 0330
No further orders at this time, will continue to monitor.
Will clamp EVD for 1hr from 23:00-00:00 per PA order.

## 2023-05-25 NOTE — PROVIDER CONTACT NOTE (OTHER) - DATE AND TIME:
17-May-2023 22:00
19-May-2023 04:44
19-May-2023 06:02
18-May-2023 22:00
19-May-2023 02:55
19-May-2023 07:57
18-May-2023 07:00
19-May-2023 22:10
18-May-2023 16:32
19-May-2023 00:02
19-May-2023 22:45
25-May-2023 23:04

## 2023-05-25 NOTE — BRIEF OPERATIVE NOTE - NSICDXBRIEFPROCEDURE_GEN_ALL_CORE_FT
PROCEDURES:  Insertion or replacement of external ventricular drain (EVD) 17-May-2023 13:12:00  Yojana Forrester  
PROCEDURES:   shunt 24-May-2023 11:02:15  Flaco Funez  
PROCEDURES:   shunt 24-May-2023 11:02:15  Flaco Funez  
PROCEDURES:  Revision, ventricular shunt 25-May-2023 10:33:51 Revision left ventricular catheter of  shunt Yojana Forrester

## 2023-05-25 NOTE — PROGRESS NOTE ADULT - SUBJECTIVE AND OBJECTIVE BOX
NEUROSURGERY POST OP NOTE:    POD# 0 S/P VPS proximal revision    S: pt reports severe headache. denies vision changes, sob, cp, n/v, weakness, numbness      T(C): 37.3 (05-25-23 @ 08:15), Max: 37.3 (05-24-23 @ 22:09)  HR: 55 (05-25-23 @ 11:37) (51 - 83)  BP: 138/67 (05-25-23 @ 11:37) (108/63 - 156/78)  RR: 14 (05-25-23 @ 11:37) (9 - 24)  SpO2: 100% (05-25-23 @ 11:37) (94% - 100%)      05-24-23 @ 07:01  -  05-25-23 @ 07:00  --------------------------------------------------------  IN: 1890 mL / OUT: 664 mL / NET: 1226 mL        artificial  tears Solution 1 Drop(s) Right EYE every 4 hours PRN  benzocaine/menthol Lozenge 1 Lozenge Oral every 4 hours PRN  bisacodyl 5 milliGRAM(s) Oral daily PRN  HYDROmorphone  Injectable 0.5 milliGRAM(s) IV Push once  levETIRAcetam 500 milliGRAM(s) Oral two times a day  methocarbamol 500 milliGRAM(s) Oral every 8 hours PRN  metoclopramide Injectable 10 milliGRAM(s) IV Push every 8 hours PRN  ofloxacin 0.3% Ophthalmic Solution for OTIC Use 4 Drop(s) Left Ear four times a day  ondansetron Injectable 4 milliGRAM(s) IV Push every 6 hours PRN  polyethylene glycol 3350 17 Gram(s) Oral two times a day  senna 2 Tablet(s) Oral at bedtime  sodium chloride 0.9%. 1000 milliLiter(s) IV Continuous <Continuous>  traMADol 50 milliGRAM(s) Oral every 4 hours PRN  traMADol 25 milliGRAM(s) Oral every 4 hours PRN  vancomycin  IVPB 1250 milliGRAM(s) IV Intermittent once      RADIOLOGY:     Exam:  GEN: laying in bed, NAD  NEURO: AOx3. FC, OE spont, speech intact, face symmetric. CNII-XII intact. PERRL, EOMI. No pronator drift. MAEx4. 5/5 strength throughout. SILT  CV: RRR +S1/S2  PULM: CTAB  GI: +tender to palpation. Abd soft, ND.  EXT: ext warm, dry, nontender    WOUND/DRAINS: L VPS incision c/d/i    DEVICES:       Assessment: 63 years old female with left otorrhea admitted from ED with severe left sided headache and intermittent dizzinesss for Left craniotomy for repair of left otorrhea, intra op EVD placement on 05/17/23. Now s/p L VPS (Certas @5) (5/24). now s/p VPS proximal (5/25).      Plan:  Neuro:  - Neuro/vitals q4h  - L VPS (Certas @5)   - keppra 500mg BID   - Pain control prn tramadol, robaxin for jaw pain   - post-op CTH completed, L frontal VPS catheter terminating in the corpus callosum, not in vetricle, ventricular size stable.   - CT Cassel 5/24 for VPS revision planning complete and stable  - plan for proximal shunt revision tomorrow with Dr. Ramirez, case added-on   - incision checks daily, staple watch, can remove L frontal VPS dressing on POD#2     Cardio:  - SBP<140, qtc 431 5/19   - Restart home losartan 50 if needed   - Statin held for myalgia     Pulm:   - RA    GI:  - ADAT  - Bowel regimen  - NPO after midnight     Renal:  - voiding  - NS @ 80 until tolerating PO diet     Endo:   - ISS     ID:   - ID consulted for empiric meningitis coverage, now off antibiotics   - s/p vanc/cefepime (5/17-5/19)   - periop vanc dose x1 ordered     Heme:  - SCDs for DVT ppx/ SQL held for OR     Misc:   - ofloxacin L eardrop bid    Discussed w/ Dr. Ramirez       Assessment:  Present when checked    []  GCS  E   V  M     Heart Failure: []Acute, [] acute on chronic , []chronic  Heart Failure:  [] Diastolic (HFpEF), [] Systolic (HFrEF), []Combined (HFpEF and HFrEF), [] RHF, [] Pulm HTN, [] Other    [] MARY, [] ATN, [] AIN, [] other  [] CKD1, [] CKD2, [] CKD 3, [] CKD 4, [] CKD 5, []ESRD    Encephalopathy: [] Metabolic, [] Hepatic, [] toxic, [] Neurological, [] Other    Abnormal Nurtitional Status: [] malnurtition (see nutrition note), [ ]underweight: BMI < 19, [] morbid obesity: BMI >40, [] Cachexia    [] Sepsis  [] hypovolemic shock,[] cardiogenic shock, [] hemorrhagic shock, [] neuogenic shock  [] Acute Respiratory Failure  []Cerebral edema, [] Brain compression/ herniation,   [] Functional quadriplegia  [] Acute blood loss anemia       NEUROSURGERY POST OP NOTE:    POD# 0 S/P VPS proximal revision    S: pt reports severe headache. denies vision changes, sob, cp, n/v, weakness, numbness      T(C): 37.3 (05-25-23 @ 08:15), Max: 37.3 (05-24-23 @ 22:09)  HR: 55 (05-25-23 @ 11:37) (51 - 83)  BP: 138/67 (05-25-23 @ 11:37) (108/63 - 156/78)  RR: 14 (05-25-23 @ 11:37) (9 - 24)  SpO2: 100% (05-25-23 @ 11:37) (94% - 100%)      05-24-23 @ 07:01  -  05-25-23 @ 07:00  --------------------------------------------------------  IN: 1890 mL / OUT: 664 mL / NET: 1226 mL        artificial  tears Solution 1 Drop(s) Right EYE every 4 hours PRN  benzocaine/menthol Lozenge 1 Lozenge Oral every 4 hours PRN  bisacodyl 5 milliGRAM(s) Oral daily PRN  HYDROmorphone  Injectable 0.5 milliGRAM(s) IV Push once  levETIRAcetam 500 milliGRAM(s) Oral two times a day  methocarbamol 500 milliGRAM(s) Oral every 8 hours PRN  metoclopramide Injectable 10 milliGRAM(s) IV Push every 8 hours PRN  ofloxacin 0.3% Ophthalmic Solution for OTIC Use 4 Drop(s) Left Ear four times a day  ondansetron Injectable 4 milliGRAM(s) IV Push every 6 hours PRN  polyethylene glycol 3350 17 Gram(s) Oral two times a day  senna 2 Tablet(s) Oral at bedtime  sodium chloride 0.9%. 1000 milliLiter(s) IV Continuous <Continuous>  traMADol 50 milliGRAM(s) Oral every 4 hours PRN  traMADol 25 milliGRAM(s) Oral every 4 hours PRN  vancomycin  IVPB 1250 milliGRAM(s) IV Intermittent once      RADIOLOGY:     Exam:  GEN: laying in bed, NAD  NEURO: AOx3. FC, OE spont, speech intact, face symmetric. CNII-XII intact. PERRL, EOMI. No pronator drift. MAEx4. 5/5 strength throughout. SILT  CV: RRR +S1/S2  PULM: CTAB  GI: +tender to palpation. Abd soft, ND.  EXT: ext warm, dry, nontender    WOUND/DRAINS: L VPS incision c/d/i    DEVICES:       Assessment: 63 years old female with left otorrhea admitted from ED with severe left sided headache and intermittent dizzinesss for Left craniotomy for repair of left otorrhea, intra op EVD placement on 05/17/23. Now s/p L VPS (Certas @5) (5/24). now s/p VPS proximal (5/25).      Plan:  Neuro:  - Neuro/vitals q4h  - L VPS (Certas @5)   - keppra 500mg BID   - Pain control prn tramadol, robaxin for jaw pain   - post-op CTH completed 5/24, L frontal VPS catheter terminating in the corpus callosum, not in vetricle, ventricular size stable.   - post op CTH revision 5/25 pending    Cardio:  - SBP<140, qtc 431 5/19   - Restart home losartan 50 if needed   - Statin held for myalgia     Pulm:   - RA    GI:  - ADAT  - Bowel regimen    Renal:  - voiding  - NS @ 80 until tolerating PO diet     Endo:   - ISS     ID:   - ID consulted for empiric meningitis coverage, now off antibiotics   - s/p vanc/cefepime (5/17-5/19)   - periop vanc dose x1 ordered     Heme:  - SCDs for DVT ppx/ SQL held for OR     Misc:   - ofloxacin L eardrop bid    Discussed w/ Dr. Ramirez       Assessment:  Present when checked    []  GCS  E   V  M     Heart Failure: []Acute, [] acute on chronic , []chronic  Heart Failure:  [] Diastolic (HFpEF), [] Systolic (HFrEF), []Combined (HFpEF and HFrEF), [] RHF, [] Pulm HTN, [] Other    [] MARY, [] ATN, [] AIN, [] other  [] CKD1, [] CKD2, [] CKD 3, [] CKD 4, [] CKD 5, []ESRD    Encephalopathy: [] Metabolic, [] Hepatic, [] toxic, [] Neurological, [] Other    Abnormal Nurtitional Status: [] malnurtition (see nutrition note), [ ]underweight: BMI < 19, [] morbid obesity: BMI >40, [] Cachexia    [] Sepsis  [] hypovolemic shock,[] cardiogenic shock, [] hemorrhagic shock, [] neuogenic shock  [] Acute Respiratory Failure  []Cerebral edema, [] Brain compression/ herniation,   [] Functional quadriplegia  [] Acute blood loss anemia

## 2023-05-25 NOTE — DISCHARGE NOTE PROVIDER - CARE PROVIDER_API CALL
Zeus Ramirez)  Neurosurgery  [address], [address] [place] [address]  Phone: [phone]  Fax: [phone]  Follow Up Time:     Nahid Falcon)  Neurotology; Otolaryngology  [address] [facility], [address] [place] [address]  Phone: [phone]  Fax: [phone]  Follow Up Time:     Jaspreet Mckenzie  Surgery  [address], [address]  [place] [address]  Phone: [phone]  Fax: [phone]  Follow Up Time:

## 2023-05-25 NOTE — PROGRESS NOTE ADULT - ASSESSMENT
Assessment and Plan:  MARIA FERNANDA VILLARREAL is a 63 years old female with left otorrhea admitted from ED with severe left sided headache and intermittent dizziness for Left craniotomy for repair of left middle fossa CSF leak, intra op EVD placement on 05/17/23.    Plan:  Neuro:  - Pain control   - Incision care as per NSGY   - if she requires oxygen, please avoid nasal cannula if possible as places pressure on post-auricular incision  - PLan to continue ofloxacin drops  - ENT will remove xeroform POD 10 or prior to dc  - Patient will eb returning to OR for revision VPS   - ENT continue to follow     [facility] at [phone] with any questions/concerns.    Ashley Mills PA-C  05-25-23 @ 07:02     Assessment and Plan:  MARIA FERNANDA VILLARREAL is a 63 years old female with left otorrhea admitted from ED with severe left sided headache and intermittent dizziness for Left craniotomy for repair of left middle fossa CSF leak, intra op EVD placement on 05/17/23.    Plan:  Neuro:  - Pain control   - Incision care as per NSGY   - if she requires oxygen, please avoid nasal cannula if possible as places pressure on post-auricular incision  - PLan to continue ofloxacin drops  - Patient will be returning to OR for revision VPS   - ENT continue to follow     [facility] at [phone] with any questions/concerns.    Ashley Mills PA-C  05-25-23 @ 07:02

## 2023-05-25 NOTE — PRE-ANESTHESIA EVALUATION ADULT - NSANTHPEFT_GEN_ALL_CORE
Alert and oriented X 3 in no acute distress  Heart RRR  Lungs Breathing comfortably, no tachypnea Alert and oriented X 3 in no acute distress  HEENT: Right eye noted conjunctival injection.  Pt states it started this morning  Heart RRR  Lungs Breathing comfortably, no tachypnea

## 2023-05-25 NOTE — CHART NOTE - NSCHARTNOTEFT_GEN_A_CORE
Patient pulled out IVs and refused placement of a new one. States she is going home tomorrow and does not want anything "attached to her veins" because of bruising. Provided proper education that if patient were to require IV fluids or IV medication in an emergent scenario she would need IV access and she agreed to IV placement if needed. Patient pulled out IVs and refused placement of a new one. States she is going home tomorrow and does not want anything "attached to her veins" because of bruising. Provided proper education that if patient were to require IV fluids or IV medication in an emergent scenario she would need IV access and she agreed to IV placement if needed. Pt is hemodynamically stable, vital signs stable.

## 2023-05-25 NOTE — PROVIDER CONTACT NOTE (OTHER) - REASON
BG 93
EVD output 21cc for 3 am hour
EVD output for 0000 was 26 cc
HR bradycardiac 49
EVD output 22 cc at 0436
EVD output 22cc for 7 am hour
EVD output 20 cc
EVD output 24 from 8045-2018
Pt complaining if 8/10 head pain
Removing all IV accesses
EVD output @ 20cc
Temp of 100.5 oral

## 2023-05-25 NOTE — DISCHARGE NOTE PROVIDER - NSDCFUADDINST_GEN_ALL_CORE_FT
Neurosurgery follow up appointment date/time:  - are staples/sutures in place?  - what day should staples/sutures be removed (POD 10-14)?  - please call the office to confirm appointment: [phone]     Wound Care:  - can patient shower?  - does dressing need to be changed/removed?  - no picking at incision  - wears glasses?   - pressure ulcer?     Devices:  - does patient need collar or brace or helmet?   - does collar/brace need to be worn at all times or just when OOB?  - RW or cane for ambulation?    Drains/Lines:  -    Activity:  - fatigue is common after surgery, rest if you feel tired   - no bending, lifting, twisting or heavy lifting   - walking is recommended, ambulate as tolerated  - you may shower when you get home, keep your incision dry  - no soaking in a tub/pool/hot tub   - no driving within 24 hours of anesthesia or while taking prescription pain medications   - keep hydrated, drink plenty of water     Inpatient consults:  - ENT  - General Surgery    Please also follow up with your primary care doctor.     Pain Expectations:  - pain after surgery is expected  - please take pain meds as prescribed     Medications:  - changes to home meds (ex. AED's)?  - new meds?  - pain meds?  - when can antiplatelets or anticoagulants be restarted?  - were adverse affects of meds discussed with patients?   - pain medications can cause constipation, you should eat a high fiber diet and may take a stool softener while on pain meds   - Avoid taking Advil (ibuprofen), Motrin (naproxen), or Aspirin for pain as they can cause bleeding     Call the office or come to ED if:  - wound has drainage or bleeding, increased redness or pain at incision site, neurological change, fever (>101), chills, night sweats, syncope, nausea/vomiting, chest pain, shortness of breath      Playback:  - See [facility] for a copy of your discharge paperwork     WITHIN 24 HOURS OF DISCHARGE, PLEASE CONTACT NEURO PA  WITH ANY QUESTIONS OR CONCERNS: [phone]   OTHERWISE, PLEASE CALL THE OFFICE WITH ANY QUESTIONS OR CONCERNS: [phone] Neurosurgery follow up appointment date/time:  - Follow up in the office for a wound check and staple removal   - please call the office to confirm appointment: [phone]     Wound Care:  - shower and wash hair daily  - pat dry incision after showering  - leave incision uncovered, open to air   - no picking at incision    Devices:  - RW or cane for ambulation?  - VPS in place, Certas at 5    Activity:  - fatigue is common after surgery, rest if you feel tired   - no bending, lifting, twisting or heavy lifting   - walking is recommended, ambulate as tolerated  - you may shower when you get home, keep your incision dry  - no soaking in a tub/pool/hot tub   - no driving within 24 hours of anesthesia or while taking prescription pain medications   - keep hydrated, drink plenty of water     Inpatient consults:  - ENT: continue Ofloxacin drops, follow up with Dr. Falcon outpatient    - General Surgery: Follow up with Dr. Mckenzie outpatient     Please also follow up with your primary care doctor.     Pain Expectations:  - pain after surgery is expected  - please take pain meds as prescribed     Medications:  - changes to home meds (ex. AED's)?  - new meds?  - pain meds: Tylenol 500mg every 6 hours as needed for mild to moderate pain   - pain medications can cause constipation, you should eat a high fiber diet and may take a stool softener while on pain meds   - Avoid taking Advil (ibuprofen), Motrin (naproxen), or Aspirin for pain as they can cause bleeding     Call the office or come to ED if:  - wound has drainage or bleeding, increased redness or pain at incision site, neurological change, fever (>101), chills, night sweats, syncope, nausea/vomiting, chest pain, shortness of breath      Playback:  - See [employer] [facility] for a copy of your discharge paperwork     WITHIN 24 HOURS OF DISCHARGE, PLEASE CONTACT NEURO PA  WITH ANY QUESTIONS OR CONCERNS: [phone]   OTHERWISE, PLEASE CALL THE OFFICE WITH ANY QUESTIONS OR CONCERNS: [phone] Neurosurgery follow up appointment date/time:  - Follow up in the office for a wound check and suture removal   - please call the office to confirm appointment: [phone]     Wound Care:  - shower and wash hair daily. Use your regular shampoo  - pat dry incision after showering  - leave incision uncovered, open to air   - no picking at incision    Devices:  - VPS in place, Certas at 5    Activity:  - fatigue is common after surgery, rest if you feel tired   - no bending, lifting, twisting or heavy lifting   - walking is recommended, ambulate as tolerated  - you may shower when you get home, keep your incision dry  - no soaking in a tub/pool/hot tub   - no driving within 24 hours of anesthesia or while taking prescription pain medications   - keep hydrated, drink plenty of water     Inpatient consults:  - ENT: continue Ofloxacin drops x 1 week from surgery (5/31), follow up with Dr. Falcon outpatient    - General Surgery: Follow up with Dr. Mckenzie outpatient     Please also follow up with your primary care doctor.     Pain Expectations:  - pain after surgery is expected  - please take pain meds as prescribed     Medications:  - take tylenol as needed for mild pain, tramadol as needed for moderate/severe pain  - take robaxin as needed for jaw/muscle pain  - pain medications can cause constipation, you should eat a high fiber diet and may take a stool softener while on pain meds   - Avoid taking Advil (ibuprofen), Motrin (naproxen), or Aspirin for pain as they can cause bleeding     Call the office or come to ED if:  - wound has drainage or bleeding, increased redness or pain at incision site, neurological change, fever (>101), chills, night sweats, syncope, nausea/vomiting, chest pain, shortness of breath      Playback:  - See [facility] for a copy of your discharge paperwork     WITHIN 24 HOURS OF DISCHARGE, PLEASE CONTACT NEURO PA  WITH ANY QUESTIONS OR CONCERNS: [phone]   OTHERWISE, PLEASE CALL THE OFFICE WITH ANY QUESTIONS OR CONCERNS: [phone] Neurosurgery follow up appointment date/time:  - Follow up in the office for a wound check and suture/staple removal   - please call the office to confirm appointment: [phone]     Wound Care:  - shower and wash hair daily. Use your regular shampoo  - pat dry incision after showering  - leave incision uncovered, open to air   - no picking at incision    Devices:  - VPS in place, Certas at 5    Activity:  - fatigue is common after surgery, rest if you feel tired   - no bending, lifting, twisting or heavy lifting   - walking is recommended, ambulate as tolerated  - you may shower when you get home, keep your incision dry  - no soaking in a tub/pool/hot tub   - no driving within 24 hours of anesthesia or while taking prescription pain medications   - keep hydrated, drink plenty of water     Inpatient consults:  - ENT: continue Ofloxacin drops x 1 week from surgery (5/31), follow up with Dr. Falcon outpatient    - General Surgery: Follow up with Dr. Mckenzie outpatient     Please also follow up with your primary care doctor.     Pain Expectations:  - pain after surgery is expected  - please take pain meds as prescribed     Medications:  - continue home Losartan, Rosuvastatin   - new meds: Ofloxacin ear drops until follow up with ENT  - take Tylenol 500mg every 6 horus as needed for mild to moderate pain, Tramadol 50mg every 6 hours as needed for severe pain (can cause sleepiness, constipation)  - pain medications can cause constipation, you should eat a high fiber diet and may take a stool softener while on pain meds   - Avoid taking Advil (ibuprofen), Motrin (naproxen), or Aspirin for pain as they can cause bleeding     Call the office or come to ED if:  - wound has drainage or bleeding, increased redness or pain at incision site, neurological change, fever (>101), chills, night sweats, syncope, nausea/vomiting, chest pain, shortness of breath      Playback:  - See [facility] for a copy of your discharge paperwork     WITHIN 24 HOURS OF DISCHARGE, PLEASE CONTACT NEURO PA  WITH ANY QUESTIONS OR CONCERNS: [phone]   OTHERWISE, PLEASE CALL THE OFFICE WITH ANY QUESTIONS OR CONCERNS: [phone] Neurosurgery follow up appointment date/time:  - Follow up in the office for a wound check and suture/staple removal   - please call the office to confirm appointment: [phone]     Wound Care:  - shower and wash hair daily. Use your regular shampoo  - pat dry incision after showering  - leave incision uncovered, open to air   - no picking at incision    Devices:  - VPS in place, Certas at 5    Activity:  - fatigue is common after surgery, rest if you feel tired   - no bending, lifting, twisting or heavy lifting   - walking is recommended, ambulate as tolerated  - you may shower when you get home, keep your incision dry  - no soaking in a tub/pool/hot tub   - no driving within 24 hours of anesthesia or while taking prescription pain medications   - keep hydrated, drink plenty of water     Inpatient consults:  - ENT: continue Ofloxacin drops x 1 week from surgery (5/31), follow up with Dr. Falcon outpatient    - General Surgery: Follow up with Dr. Mckenzie outpatient     Please also follow up with your primary care doctor.     Pain Expectations:  - pain after surgery is expected  - please take pain meds as prescribed     Medications:  - continue home Losartan, Rosuvastatin   - new meds: Ofloxacin ear drops until follow up with ENT  - take Tylenol 500mg every 6 hours as needed for mild to moderate pain, Tramadol 50mg every 6 hours as needed for severe pain (can cause sleepiness, constipation)  - pain medications can cause constipation, you should eat a high fiber diet and may take a stool softener while on pain meds   - Avoid taking Advil (ibuprofen), Motrin (naproxen), or Aspirin for pain as they can cause bleeding     Call the office or come to ED if:  - wound has drainage or bleeding, increased redness or pain at incision site, neurological change, fever (>101), chills, night sweats, syncope, nausea/vomiting, chest pain, shortness of breath      Playback:  - See [facility] for a copy of your discharge paperwork     WITHIN 24 HOURS OF DISCHARGE, PLEASE CONTACT NEURO PA  WITH ANY QUESTIONS OR CONCERNS: [phone]   OTHERWISE, PLEASE CALL THE OFFICE WITH ANY QUESTIONS OR CONCERNS: [phone] Neurosurgery follow up appointment date/time:  - Follow up in the office for a wound check and suture/staple removal   - please call the office to confirm appointment: [phone]     Wound Care:  - shower and wash hair daily. Use your regular shampoo  - pat dry incision after showering  - leave incision uncovered, open to air   - no picking at incision    Devices:  - VPS in place, Certas at 5  - Rolling walker for ambulation    Activity:  - fatigue is common after surgery, rest if you feel tired   - no bending, lifting, twisting or heavy lifting   - walking is recommended, ambulate as tolerated  - you may shower when you get home, keep your incision dry  - no soaking in a tub/pool/hot tub   - no driving within 24 hours of anesthesia or while taking prescription pain medications   - keep hydrated, drink plenty of water     Inpatient consults:  - ENT: continue Ofloxacin drops x 1 week from surgery (5/31), follow up with Dr. Falcon outpatient    - General Surgery: Follow up with Dr. Mckenzie outpatient     Please also follow up with your primary care doctor.     Pain Expectations:  - pain after surgery is expected  - please take pain meds as prescribed     Medications:  - continue home Losartan, Rosuvastatin   - new meds: Ofloxacin ear drops until follow up with ENT  - take Tylenol 500mg every 6 hours as needed for mild to moderate pain, Tramadol 50mg every 6 hours as needed for severe pain (can cause sleepiness, constipation)  - pain medications can cause constipation, you should eat a high fiber diet and may take a stool softener while on pain meds   - Avoid taking Advil (ibuprofen), Motrin (naproxen), or Aspirin for pain as they can cause bleeding     Call the office or come to ED if:  - wound has drainage or bleeding, increased redness or pain at incision site, neurological change, fever (>101), chills, night sweats, syncope, nausea/vomiting, chest pain, shortness of breath      Playback:  - See [facility] for a copy of your discharge paperwork     WITHIN 24 HOURS OF DISCHARGE, PLEASE CONTACT NEURO PA  WITH ANY QUESTIONS OR CONCERNS: [phone]   OTHERWISE, PLEASE CALL THE OFFICE WITH ANY QUESTIONS OR CONCERNS: [phone]

## 2023-05-25 NOTE — CHART NOTE - NSCHARTNOTESELECT_GEN_ALL_CORE
Event Note
Event Note
Progress Note/Event Note
Event Note
Follow Up/Nutrition Services
Progress Note/Event Note
postop check/Event Note

## 2023-05-25 NOTE — DISCHARGE NOTE PROVIDER - CARE PROVIDERS DIRECT ADDRESSES
,[email or web address],DirectAddress_Unknown,DirectAddress_Unknown

## 2023-05-25 NOTE — PROVIDER CONTACT NOTE (OTHER) - ASSESSMENT
Neuro exam intact. VSS
Neuro status WDL, VSS. No complaints of pain or SOB.
Pt sleeping in bed, dorothea to 49. When awaken 60's. 's-120's. MD Carey and neuro team rounding on patient.
VSS, neuro exam intact
VSS, no complaints of pain, neuro status at baseline
VSS. No change in neuro exam
Pt is hemodynamically stable, VSS.

## 2023-05-25 NOTE — PRE-OP CHECKLIST - NSBLOODTRANS_GEN_A_CORE_SIUH
oriented to person, place and time. Grossly normal motor function and sensation, facial cranial nerves grossly normal no...

## 2023-05-25 NOTE — PROVIDER CONTACT NOTE (OTHER) - NAME OF MD/NP/PA/DO NOTIFIED:
AMAURY Ryder
AMAURY Ryder
AMAURY Zamorano
MD Carey, AMAURY Garner
AMAURY Ryder
AMAURY Ryder
Cayla Garcia, PA
AMAURY Gonzalez
AMAURY Ryder
AMAURY Zamorano
AMAURY Mortensen
AMAURY Ryder

## 2023-05-25 NOTE — DISCHARGE NOTE PROVIDER - NSDCHHASSISTDEVIC_GEN_ALL_CORE_FT
Follow up with your primary care doctor in 1-2 weeks of discharge You were found to have a urinary tract infection. Treated with an antibiotic called Amoxicillin. You will take this medication for 7 days total. You were evaluated for lower back pain. Given the new onset of the back pain, we would recommend taking tylenol or ibuprofen for pain and inflammation. If the back pain continues, you can follow up with your primary care physician. You were found to have high calcium in your blood. We determined that you have primary hyperparathyroidism, which is when the parathyroid gland releases too much hormone and causes your body to retain calcium. You can follow up with your primary care doctor for further workup. You were found to have a urinary tract infection. Treated with an antibiotic called Amoxicillin. You will take this medication for 7 days total - you received 3 days of the antibiotics already, you have 4 more days to complete it (last day Nov 1) You were found to have high calcium in your blood. We determined that you have primary hyperparathyroidism, which is when the parathyroid gland releases too much hormone and causes your body to retain calcium. Your throid/parathyroid ultrasound was unrevealing. You still need a scan called Sestamibi scan of the parathyroid gland which can you can schedule an appointment for as outpatient. Please talk to your primary care doctor to obtain a referral. Once your scan is completed, please follow up with an ENT doctor for appointment (you may make this appointment ahead of time, number is listed below). In the meantime, please avoid foods high in calcium such as milk based products. Drink sufficient water during the day. Avoid staying in bed for prolonged periods at a time and stay active. gait instability s/p craniotomy for skull defect repair and VPS placement

## 2023-05-25 NOTE — DISCHARGE NOTE PROVIDER - NSDCFUADDAPPT_GEN_ALL_CORE_FT
Please follow up with Dr. Ramirez    Please follow up with Dr. Falcon from ENT     Please follow up with Dr. Mckenzie from General Surgery    Please follow up with your primary care doctor  Please follow up with Katherine (NP with Dr. Ramirez) on 6/6/23 at 2:30PM, call the office to confirm at [phone]    Please follow up with Dr. Falcon from ENT in 1 week     Please follow up with Dr. Mckenzie from General Surgery if needed outpatient     Please follow up with your primary care doctor

## 2023-05-25 NOTE — DISCHARGE NOTE PROVIDER - PROVIDER TOKENS
PROVIDER:[TOKEN:[[id number]]],PROVIDER:[TOKEN:[[id number]]],PROVIDER:[TOKEN:[[id number]]]

## 2023-05-25 NOTE — PROGRESS NOTE ADULT - SUBJECTIVE AND OBJECTIVE BOX
OTOLARYNGOLOGY (ENT) PROGRESS NOTE    PATIENT: MARIA FERNANDA VILLARREAL  MRN: [id number]  : 62  [id number]  DATE OF SERVICE:  23  			         ID:MARIA FERNANDA VILLARREAL is a  63yFemale S/P L EVD placement and L temp crani for CSF leak repair w/ ENT    Subjective/ Interval:   No issues overnight, pain minimal, mastoid soft head compression dressing in place, EVD open at 5cc  ; patient seen bedside, dressings in place, EVD clamped until 7  : NAEON. Patient tmax 100.5, otherwise afebrile and vitals stable. Pain well controlled. Denies headache, nausea/vomiting, or dizziness. GRECIA removed. EVD open at 5. Cultures NGTD.  : NAEON. AFVSS. Pain well controlled. Denies headache, nausea/vomiting, or dizziness. EVD open at 5. Cultures NGTD.  : NAEON. AFVSS. Pain well controlled. She reports some tenderness over incision site. EVD remains in place. Cultures remain NGTD.  : NAEON. AFVSS. Pain well controlled. EVD remains in place. She required oxygen overnight while sleeping, which was administered with nasal cannula. Final OR and CSF cultures negative.  : NAEON. AFVSS. Pain well controlled. Denies headache, nausea/vomiting, or dizziness. EVD open at 5. Receiving ear drops. No new complaints.  ; patient seen bedside this morning, continues to recieve ear drops, no new complaints, plan for revision VCS as shunt is not in ventricular system on CT scan, xeroform rmains in place     ALLERGIES:  penicillins (Anaphylaxis)  dairy products (Angioedema)      MEDICATIONS:  Antiinfectives:     IV fluids:  sodium chloride 0.9%. 1000 milliLiter(s) IV Continuous <Continuous>    Hematologic/Anticoagulation:    Pain medications/Neuro:  acetaminophen     Tablet .. 1000 milliGRAM(s) Oral every 8 hours PRN  levETIRAcetam 500 milliGRAM(s) Oral two times a day  methocarbamol 500 milliGRAM(s) Oral every 8 hours PRN  metoclopramide Injectable 10 milliGRAM(s) IV Push every 8 hours PRN  traMADol 50 milliGRAM(s) Oral every 4 hours PRN  traMADol 25 milliGRAM(s) Oral every 4 hours PRN    Endocrine Medications:     All other standing medications:   chlorhexidine 2% Cloths 1 Application(s) Topical daily  ofloxacin 0.3% Ophthalmic Solution for OTIC Use 4 Drop(s) Left Ear four times a day  polyethylene glycol 3350 17 Gram(s) Oral two times a day  povidone iodine 5% Nasal Swab 1 Application(s) Both Nostrils once  senna 2 Tablet(s) Oral at bedtime    All other PRN medications:  artificial  tears Solution 1 Drop(s) Right EYE every 4 hours PRN  benzocaine/menthol Lozenge 1 Lozenge Oral every 4 hours PRN  bisacodyl 5 milliGRAM(s) Oral daily PRN    Vital Signs Last 24 Hrs  T(C): 37.3 (25 May 2023 06:38), Max: 37.3 (24 May 2023 11:30)  T(F): 99.2 (25 May 2023 06:38), Max: 99.2 (24 May 2023 11:30)  HR: 77 (25 May 2023 05:24) (51 - 83)  BP: 126/62 (25 May 2023 06:38) (108/63 - 151/66)  BP(mean): 88 (25 May 2023 05:24) (79 - 102)  RR: 20 (25 May 2023 06:38) (9 - 24)  SpO2: 94% (25 May 2023 06:38) (93% - 100%)    Parameters below as of 25 May 2023 05:24  Patient On (Oxygen Delivery Method): room air           @ 07:01  -   @ 07:00  --------------------------------------------------------  IN:    IV PiggyBack: 250 mL    Oral Fluid: 230 mL    sodium chloride 0.9%: 400 mL    sodium chloride 0.9%: 880 mL  Total IN: 1760 mL    OUT:    External Ventricular Device (mL): 14 mL    Voided (mL): 650 mL  Total OUT: 664 mL    Total NET: 1096 mL          23 @ 07:01  -  23 @ 07:00  --------------------------------------------------------  IN:  Total IN: 0 mL    OUT:    External Ventricular Device (mL): 14 mL  Total OUT: 14 mL    Total NET: -14 mL        PHYSICAL EXAM:  General: patient seen laying supine in bed in NAD  Neuro: AAOx3, speech clear and fluent, VPS noted   HEENT: EOMI, mastoid soft, incision c/d/i, xeroform sutured in place, small edema around superior incision stable   Pulmonary: regular respirations, non labored, on nasal cannula, removed on rounds this morning   Ext: perfusing well  Skin: warm, dry    LABS                       11.7   11.29 )-----------( 292      ( 25 May 2023 06:33 )             36.3        138  |  103  |  8   ----------------------------<  113<H>  4.0   |  24  |  0.52    Ca    8.5      25 May 2023 06:33  Phos  3.2       Mg     2.1                Coagulation Studies-       Endocrine Panel-  Calcium, Total Serum: 8.5 mg/dL ( @ 06:33)                MICROBIOLOGY:  Culture Results:   No growth to date (23 @ 09:46)  Culture Results:   No growth to date (23 @ 07:27)  Culture Results:   No growth to date (23 @ 19:30)  Culture Results:   No growth (23 @ 16:17)  Culture Results:   No growth (23 @ 16:17)      Culture - CSF with Gram Stain (collected 23 @ 09:46)  Source: .CSF CSF  Gram Stain (23 @ 12:16):    No organisms seen    No White blood cells  Preliminary Report (23 @ 10:45):    No growth to date    Culture - CSF with Gram Stain (collected 23 @ 07:27)  Source: .CSF CSF  Gram Stain (23 @ 10:34):    No organisms seen    Rare WBC's  Preliminary Report (23 @ 09:58):    No growth to date        RADIOLOGY & ADDITIONAL STUDIES:    ACC: [id number] EXAM:  CT BRAIN   ORDERED BY: ESTEPHANIA RENTERIAUGHN     PROCEDURE DATE:  2023          INTERPRETATION:  INDICATIONS: Preop ventriculoperitoneal shunt proximal   revision.    TECHNIQUE: Serial axial images were obtained from the skull base to the   vertex without the use of intravenous contrast. Sagittal and coronal   reformatted images were created from the axial data set. Images were   reviewed in the bone, brain and subdural windows.    COMPARISON: CT head 2023.    FINDINGS:  INTRA-AXIAL: No intracranial mass effect, midline shift or acute   transcortical infarct is seen.  EXTRA-AXIAL: Subjacent to craniotomy site, redemonstrated and stable   small locule of gas and trace extra-axial blood products.  VENTRICLES AND SULCI: Left frontal approach  shunt catheter with tip   terminating in the corpus callosum, does not appear to be within the   ventricles. Redemonstrated focus of gas within the right frontal horn of   the right lateral ventricle.  VISUALIZED SINUSES: Noair-fluid levels are identified.  VISUALIZED MASTOIDS: Status post wall down left mastoidectomy.  CALVARIUM: Status post left temporal craniotomy/craniectomy.  MISCELLANEOUS: None.    IMPRESSION:  1.  No interval change in  shunt catheter position,with tip   terminating in the corpus callosum and outside of the ventricular system.  2.  Stable loculated air within the frontal horn of the right lateral   ventricle. No hydrocephalus.  3.  Stable trace pneumocephalus and extra-axial blood subjacent to left   temporal craniotomy site.    --- End of Report ---          MARK COLEMAN MD; Resident Radiologist  This document has been electronically signed.  FABIAN JETT MD; Attending Radiologist  This document has been electronically signed. 2023 11:09PM   OTOLARYNGOLOGY (ENT) PROGRESS NOTE    PATIENT: MARIA FERNANDA VILLARREAL  MRN: [id number]  : 62  [id number]  DATE OF SERVICE:  23  			         ID:MARIA FERNANDA VILLARREAL is a  63yFemale S/P L EVD placement and L temp crani for CSF leak repair w/ ENT    Subjective/ Interval:   No issues overnight, pain minimal, mastoid soft head compression dressing in place, EVD open at 5cc  ; patient seen bedside, dressings in place, EVD clamped until 7  : NAEON. Patient tmax 100.5, otherwise afebrile and vitals stable. Pain well controlled. Denies headache, nausea/vomiting, or dizziness. GRECIA removed. EVD open at 5. Cultures NGTD.  : NAEON. AFVSS. Pain well controlled. Denies headache, nausea/vomiting, or dizziness. EVD open at 5. Cultures NGTD.  : NAEON. AFVSS. Pain well controlled. She reports some tenderness over incision site. EVD remains in place. Cultures remain NGTD.  : NAEON. AFVSS. Pain well controlled. EVD remains in place. She required oxygen overnight while sleeping, which was administered with nasal cannula. Final OR and CSF cultures negative.  : NAEON. AFVSS. Pain well controlled. Denies headache, nausea/vomiting, or dizziness. EVD open at 5. Receiving ear drops. No new complaints.  ; patient seen bedside this morning, continues to receive ear drops, no new complaints, plan for revision VPS as shunt is not in ventricular system on CT scan,     ALLERGIES:  penicillins (Anaphylaxis)  dairy products (Angioedema)      MEDICATIONS:  Antiinfectives:     IV fluids:  sodium chloride 0.9%. 1000 milliLiter(s) IV Continuous <Continuous>    Hematologic/Anticoagulation:    Pain medications/Neuro:  acetaminophen     Tablet .. 1000 milliGRAM(s) Oral every 8 hours PRN  levETIRAcetam 500 milliGRAM(s) Oral two times a day  methocarbamol 500 milliGRAM(s) Oral every 8 hours PRN  metoclopramide Injectable 10 milliGRAM(s) IV Push every 8 hours PRN  traMADol 50 milliGRAM(s) Oral every 4 hours PRN  traMADol 25 milliGRAM(s) Oral every 4 hours PRN    Endocrine Medications:     All other standing medications:   chlorhexidine 2% Cloths 1 Application(s) Topical daily  ofloxacin 0.3% Ophthalmic Solution for OTIC Use 4 Drop(s) Left Ear four times a day  polyethylene glycol 3350 17 Gram(s) Oral two times a day  povidone iodine 5% Nasal Swab 1 Application(s) Both Nostrils once  senna 2 Tablet(s) Oral at bedtime    All other PRN medications:  artificial  tears Solution 1 Drop(s) Right EYE every 4 hours PRN  benzocaine/menthol Lozenge 1 Lozenge Oral every 4 hours PRN  bisacodyl 5 milliGRAM(s) Oral daily PRN    Vital Signs Last 24 Hrs  T(C): 37.3 (25 May 2023 06:38), Max: 37.3 (24 May 2023 11:30)  T(F): 99.2 (25 May 2023 06:38), Max: 99.2 (24 May 2023 11:30)  HR: 77 (25 May 2023 05:24) (51 - 83)  BP: 126/62 (25 May 2023 06:38) (108/63 - 151/66)  BP(mean): 88 (25 May 2023 05:24) (79 - 102)  RR: 20 (25 May 2023 06:38) (9 - 24)  SpO2: 94% (25 May 2023 06:38) (93% - 100%)    Parameters below as of 25 May 2023 05:24  Patient On (Oxygen Delivery Method): room air           @ : @ 07:00  --------------------------------------------------------  IN:    IV PiggyBack: 250 mL    Oral Fluid: 230 mL    sodium chloride 0.9%: 400 mL    sodium chloride 0.9%: 880 mL  Total IN: 1760 mL    OUT:    External Ventricular Device (mL): 14 mL    Voided (mL): 650 mL  Total OUT: 664 mL    Total NET: 1096 mL          23 @ 07:01  -  23 @ 07:00  --------------------------------------------------------  IN:  Total IN: 0 mL    OUT:    External Ventricular Device (mL): 14 mL  Total OUT: 14 mL    Total NET: -14 mL        PHYSICAL EXAM:  General: patient seen laying supine in bed in NAD  Neuro: AAOx3, speech clear and fluent, VPS noted   HEENT: EOMI, mastoid soft, incision c/d/i, small edema around superior incision stable   Pulmonary: regular respirations, non labored, on nasal cannula, removed on rounds this morning   Ext: perfusing well  Skin: warm, dry    LABS                       11.7   11.29 )-----------( 292      ( 25 May 2023 06:33 )             36.3        138  |  103  |  8   ----------------------------<  113<H>  4.0   |  24  |  0.52    Ca    8.5      25 May 2023 06:33  Phos  3.2       Mg     2.1                Coagulation Studies-       Endocrine Panel-  Calcium, Total Serum: 8.5 mg/dL ( @ 06:33)                MICROBIOLOGY:  Culture Results:   No growth to date (23 @ 09:46)  Culture Results:   No growth to date (23 @ 07:27)  Culture Results:   No growth to date (23 @ 19:30)  Culture Results:   No growth (23 @ 16:17)  Culture Results:   No growth (23 @ 16:17)      Culture - CSF with Gram Stain (collected 23 @ 09:46)  Source: .CSF CSF  Gram Stain (23 @ 12:16):    No organisms seen    No White blood cells  Preliminary Report (23 @ 10:45):    No growth to date    Culture - CSF with Gram Stain (collected 23 @ 07:27)  Source: .CSF CSF  Gram Stain (23 @ 10:34):    No organisms seen    Rare WBC's  Preliminary Report (23 @ 09:58):    No growth to date        RADIOLOGY & ADDITIONAL STUDIES:    ACC: [id number] EXAM:  CT BRAIN   ORDERED BY: ESTEPHANIA GASTON     PROCEDURE DATE:  2023          INTERPRETATION:  INDICATIONS: Preop ventriculoperitoneal shunt proximal   revision.    TECHNIQUE: Serial axial images were obtained from the skull base to the   vertex without the use of intravenous contrast. Sagittal and coronal   reformatted images were created from the axial data set. Images were   reviewed in the bone, brain and subdural windows.    COMPARISON: CT head 2023.    FINDINGS:  INTRA-AXIAL: No intracranial mass effect, midline shift or acute   transcortical infarct is seen.  EXTRA-AXIAL: Subjacent to craniotomy site, redemonstrated and stable   small locule of gas and trace extra-axial blood products.  VENTRICLES AND SULCI: Left frontal approach  shunt catheter with tip   terminating in the corpus callosum, does not appear to be within the   ventricles. Redemonstrated focus of gas within the right frontal horn of   the right lateral ventricle.  VISUALIZED SINUSES: Noair-fluid levels are identified.  VISUALIZED MASTOIDS: Status post wall down left mastoidectomy.  CALVARIUM: Status post left temporal craniotomy/craniectomy.  MISCELLANEOUS: None.    IMPRESSION:  1.  No interval change in  shunt catheter position,with tip   terminating in the corpus callosum and outside of the ventricular system.  2.  Stable loculated air within the frontal horn of the right lateral   ventricle. No hydrocephalus.  3.  Stable trace pneumocephalus and extra-axial blood subjacent to left   temporal craniotomy site.    --- End of Report ---          MARK COLEMAN MD; Resident Radiologist  This document has been electronically signed.  FABIAN JETT MD; Attending Radiologist  This document has been electronically signed. 2023 11:09PM

## 2023-05-25 NOTE — BRIEF OPERATIVE NOTE - COMMENTS
Yojana REBOLLEDO first assisted the entirety of the case, as no resident or fellow was available to assist.

## 2023-05-26 ENCOUNTER — TRANSCRIPTION ENCOUNTER (OUTPATIENT)
Age: 63
End: 2023-05-26

## 2023-05-26 VITALS
HEART RATE: 68 BPM | OXYGEN SATURATION: 98 % | RESPIRATION RATE: 18 BRPM | DIASTOLIC BLOOD PRESSURE: 72 MMHG | SYSTOLIC BLOOD PRESSURE: 122 MMHG | TEMPERATURE: 98 F

## 2023-05-26 PROBLEM — G96.01 CRANIAL CEREBROSPINAL FLUID LEAK, SPONTANEOUS: Chronic | Status: ACTIVE | Noted: 2023-05-16

## 2023-05-26 PROBLEM — E78.5 HYPERLIPIDEMIA, UNSPECIFIED: Chronic | Status: ACTIVE | Noted: 2023-05-16

## 2023-05-26 LAB
ANION GAP SERPL CALC-SCNC: 11 MMOL/L — SIGNIFICANT CHANGE UP (ref 5–17)
BUN SERPL-MCNC: 10 MG/DL — SIGNIFICANT CHANGE UP (ref 7–23)
CALCIUM SERPL-MCNC: 8.3 MG/DL — LOW (ref 8.4–10.5)
CHLORIDE SERPL-SCNC: 105 MMOL/L — SIGNIFICANT CHANGE UP (ref 96–108)
CO2 SERPL-SCNC: 24 MMOL/L — SIGNIFICANT CHANGE UP (ref 22–31)
CREAT SERPL-MCNC: 0.55 MG/DL — SIGNIFICANT CHANGE UP (ref 0.5–1.3)
EGFR: 104 ML/MIN/1.73M2 — SIGNIFICANT CHANGE UP
GLUCOSE SERPL-MCNC: 107 MG/DL — HIGH (ref 70–99)
HCT VFR BLD CALC: 33.5 % — LOW (ref 34.5–45)
HGB BLD-MCNC: 10.8 G/DL — LOW (ref 11.5–15.5)
MAGNESIUM SERPL-MCNC: 2.2 MG/DL — SIGNIFICANT CHANGE UP (ref 1.6–2.6)
MCHC RBC-ENTMCNC: 28.3 PG — SIGNIFICANT CHANGE UP (ref 27–34)
MCHC RBC-ENTMCNC: 32.2 GM/DL — SIGNIFICANT CHANGE UP (ref 32–36)
MCV RBC AUTO: 87.9 FL — SIGNIFICANT CHANGE UP (ref 80–100)
NRBC # BLD: 0 /100 WBCS — SIGNIFICANT CHANGE UP (ref 0–0)
PHOSPHATE SERPL-MCNC: 3.2 MG/DL — SIGNIFICANT CHANGE UP (ref 2.5–4.5)
PLATELET # BLD AUTO: 269 K/UL — SIGNIFICANT CHANGE UP (ref 150–400)
POTASSIUM SERPL-MCNC: 3.5 MMOL/L — SIGNIFICANT CHANGE UP (ref 3.5–5.3)
POTASSIUM SERPL-SCNC: 3.5 MMOL/L — SIGNIFICANT CHANGE UP (ref 3.5–5.3)
RBC # BLD: 3.81 M/UL — SIGNIFICANT CHANGE UP (ref 3.8–5.2)
RBC # FLD: 13.2 % — SIGNIFICANT CHANGE UP (ref 10.3–14.5)
SODIUM SERPL-SCNC: 140 MMOL/L — SIGNIFICANT CHANGE UP (ref 135–145)
WBC # BLD: 11.33 K/UL — HIGH (ref 3.8–10.5)
WBC # FLD AUTO: 11.33 K/UL — HIGH (ref 3.8–10.5)

## 2023-05-26 PROCEDURE — 85027 COMPLETE CBC AUTOMATED: CPT

## 2023-05-26 PROCEDURE — 86900 BLOOD TYPING SEROLOGIC ABO: CPT

## 2023-05-26 PROCEDURE — 86803 HEPATITIS C AB TEST: CPT

## 2023-05-26 PROCEDURE — C9399: CPT

## 2023-05-26 PROCEDURE — 97168 OT RE-EVAL EST PLAN CARE: CPT

## 2023-05-26 PROCEDURE — 83735 ASSAY OF MAGNESIUM: CPT

## 2023-05-26 PROCEDURE — 80048 BASIC METABOLIC PNL TOTAL CA: CPT

## 2023-05-26 PROCEDURE — 99285 EMERGENCY DEPT VISIT HI MDM: CPT | Mod: 25

## 2023-05-26 PROCEDURE — 87070 CULTURE OTHR SPECIMN AEROBIC: CPT

## 2023-05-26 PROCEDURE — 80202 ASSAY OF VANCOMYCIN: CPT

## 2023-05-26 PROCEDURE — 82945 GLUCOSE OTHER FLUID: CPT

## 2023-05-26 PROCEDURE — 86901 BLOOD TYPING SEROLOGIC RH(D): CPT

## 2023-05-26 PROCEDURE — 83036 HEMOGLOBIN GLYCOSYLATED A1C: CPT

## 2023-05-26 PROCEDURE — 82962 GLUCOSE BLOOD TEST: CPT

## 2023-05-26 PROCEDURE — 84484 ASSAY OF TROPONIN QUANT: CPT

## 2023-05-26 PROCEDURE — 87483 CNS DNA AMP PROBE TYPE 12-25: CPT

## 2023-05-26 PROCEDURE — C1889: CPT

## 2023-05-26 PROCEDURE — 80053 COMPREHEN METABOLIC PANEL: CPT

## 2023-05-26 PROCEDURE — 36415 COLL VENOUS BLD VENIPUNCTURE: CPT

## 2023-05-26 PROCEDURE — 85025 COMPLETE CBC W/AUTO DIFF WBC: CPT

## 2023-05-26 PROCEDURE — 84100 ASSAY OF PHOSPHORUS: CPT

## 2023-05-26 PROCEDURE — C1713: CPT

## 2023-05-26 PROCEDURE — C1729: CPT

## 2023-05-26 PROCEDURE — 87521 HEPATITIS C PROBE&RVRS TRNSC: CPT

## 2023-05-26 PROCEDURE — 82553 CREATINE MB FRACTION: CPT

## 2023-05-26 PROCEDURE — C1887: CPT

## 2023-05-26 PROCEDURE — 84157 ASSAY OF PROTEIN OTHER: CPT

## 2023-05-26 PROCEDURE — 97165 OT EVAL LOW COMPLEX 30 MIN: CPT

## 2023-05-26 PROCEDURE — 85730 THROMBOPLASTIN TIME PARTIAL: CPT

## 2023-05-26 PROCEDURE — 88304 TISSUE EXAM BY PATHOLOGIST: CPT

## 2023-05-26 PROCEDURE — 82550 ASSAY OF CK (CPK): CPT

## 2023-05-26 PROCEDURE — 97110 THERAPEUTIC EXERCISES: CPT

## 2023-05-26 PROCEDURE — 85610 PROTHROMBIN TIME: CPT

## 2023-05-26 PROCEDURE — 70450 CT HEAD/BRAIN W/O DYE: CPT | Mod: MA

## 2023-05-26 PROCEDURE — 87205 SMEAR GRAM STAIN: CPT

## 2023-05-26 PROCEDURE — 89051 BODY FLUID CELL COUNT: CPT

## 2023-05-26 PROCEDURE — 97535 SELF CARE MNGMENT TRAINING: CPT

## 2023-05-26 PROCEDURE — 71045 X-RAY EXAM CHEST 1 VIEW: CPT

## 2023-05-26 PROCEDURE — 97116 GAIT TRAINING THERAPY: CPT

## 2023-05-26 PROCEDURE — 97161 PT EVAL LOW COMPLEX 20 MIN: CPT

## 2023-05-26 PROCEDURE — 87075 CULTR BACTERIA EXCEPT BLOOD: CPT

## 2023-05-26 PROCEDURE — 86850 RBC ANTIBODY SCREEN: CPT

## 2023-05-26 RX ORDER — TRAMADOL HYDROCHLORIDE 50 MG/1
1 TABLET ORAL
Qty: 20 | Refills: 0
Start: 2023-05-26 | End: 2023-05-30

## 2023-05-26 RX ORDER — OFLOXACIN OTIC SOLUTION 3 MG/ML
4 SOLUTION/ DROPS AURICULAR (OTIC)
Qty: 0 | Refills: 0 | DISCHARGE
Start: 2023-05-26

## 2023-05-26 RX ORDER — POLYETHYLENE GLYCOL 3350 17 G/17G
17 POWDER, FOR SOLUTION ORAL
Qty: 476 | Refills: 0
Start: 2023-05-26 | End: 2023-06-08

## 2023-05-26 RX ORDER — POTASSIUM CHLORIDE 20 MEQ
40 PACKET (EA) ORAL ONCE
Refills: 0 | Status: COMPLETED | OUTPATIENT
Start: 2023-05-26 | End: 2023-05-26

## 2023-05-26 RX ADMIN — TRAMADOL HYDROCHLORIDE 50 MILLIGRAM(S): 50 TABLET ORAL at 06:56

## 2023-05-26 RX ADMIN — TRAMADOL HYDROCHLORIDE 50 MILLIGRAM(S): 50 TABLET ORAL at 06:01

## 2023-05-26 RX ADMIN — OFLOXACIN OTIC SOLUTION 4 DROP(S): 3 SOLUTION/ DROPS AURICULAR (OTIC) at 06:01

## 2023-05-26 RX ADMIN — LEVETIRACETAM 500 MILLIGRAM(S): 250 TABLET, FILM COATED ORAL at 06:01

## 2023-05-26 RX ADMIN — Medication 40 MILLIEQUIVALENT(S): at 11:00

## 2023-05-26 NOTE — PROGRESS NOTE ADULT - ASSESSMENT
Assessment and Plan:  MARIA FERNANDA VILLARREAL is a 63 years old female with left otorrhea admitted from ED with severe left sided headache and intermittent dizziness for Left craniotomy for repair of left middle fossa CSF leak, intra op EVD placement on 05/17/23.    Plan:  Neuro:  - Pain control   - Incision care as per NSGY   - if she requires oxygen, please avoid nasal cannula if possible as places pressure on post-auricular incision  - PLan to continue ofloxacin drops - for 1 week post op- please follow up with Dr. Falcon next week or early the following Monday     [facility] at [phone] with any questions/concerns.    Ashley Mills PA-C  05-26-23 @ 07:17

## 2023-05-26 NOTE — DISCHARGE NOTE NURSING/CASE MANAGEMENT/SOCIAL WORK - NSDCVIVACCINE_GEN_ALL_CORE_FT
Tdap; 18-Apr-2018 22:36; Sonny Xiong (RN); [employer]; Q0000CD; IntraMuscular; Deltoid Right.; 0.5 milliLiter(s); VIS (VIS Published: 09-May-2013, VIS Presented: 18-Apr-2018);

## 2023-05-26 NOTE — PROGRESS NOTE ADULT - SUBJECTIVE AND OBJECTIVE BOX
HPI:  HPI: 63  female comes to ER with complaint of severe left side head/face pain, intermittent dizziness, also intermittent left CSF otorrhea. She has hx of left cholesteatoma, chronic left mastoiditis  She had an eustachian ventilating tube placed in 2020 (?) which was removed about 1 month ago. She admits that since removal of  tube she is having CSF leakage from left ear and pressure like left side headache.  She states that today she had severe left side headache and left facial like pain and decided to come to ER. She requested to  speak  with Dr. Ramirez who is following her for CSF leak.   CT head is negative for acute pathology; no hemorrhage, no stroke, no hydrocephalus. (16 May 2023 16:15)    HOSPITAL COURSE:  5/16: Admitted for OR tomorrow. CT terrence complete. S/p L EVD placement and L temp crani for CSF leak repair w/ ENT.   5/17: POD0 Left craniotomy for repair of left otorrhea, intra op EVD placement on 05/17/23. Empiric meningitis coverage started, f/u ID recs.   5/18: POD1, KAMAR overnight. Tramadol started for incisional pain. CT terrence completed today. Flagyl dc'd, cefepime added. Oliveira removed, f/u TOV.  5/19: POD2, kamar overnight. Peripheral IV placed. SG GRECIA d/c. abx d/c per ID. Pt endorsed CP, non-radiating. EKG unremarkable and cardiac enzymes WNL. SQL started.   5/20: POD3, given 500 cc bolus. EVD output WNL. Given 1L bolus.  5/21: POD 4. EVD @ 5. Neuro stable. Yennifer removed. Patient complaining of clear otorrhea from L ear, none visualized. ENT contacted and reassured mild leakage is normal d/t how ear is packed upon closing, and to notify if worsens.   5/22: POD 5. EVD @ 5. Neuro stable. Robaxin started for left jaw muscle spasm. CSF profile sent for preop.  5/23: POD 6. EVD @ 5. KAMAR overnight, neuro stable. Pre-op for VPS. SQL held for OR.   5/24: POD 7. POD 0 EVD removal and left ventriculoperitoneal shunt placement (Certas @ 5). post op CTH showing tip of catheter in corpus callosum, not ventricle, repeat CTH Terrence done and prelim stable, neurologically stable, pre-op and consented for Revision of proximal L frontal VPS.    5/25: POD 8, POD 1 L VPS placement, POD0 VPS proximal revision.   5/26: POD 9, POD 2 L VPS placement, POD1 VPS proximal revision, neuro stable, patient pulled out IVs and refused placement unless in an emergency. Pt educated on the risks. Pt is hemodynamically stable, vital signs stable.    OVERNIGHT EVENTS: KAMAR overnight. Neuro exam stable.     Vital Signs Last 24 Hrs  T(C): 36.7 (25 May 2023 23:37), Max: 37.3 (25 May 2023 06:38)  T(F): 98.1 (25 May 2023 23:37), Max: 99.2 (25 May 2023 06:38)  HR: 63 (25 May 2023 23:37) (55 - 78)  BP: 134/69 (25 May 2023 23:37) (112/70 - 156/78)  BP(mean): 102 (25 May 2023 13:46) (88 - 112)  RR: 18 (25 May 2023 23:37) (11 - 20)  SpO2: 95% (25 May 2023 23:37) (94% - 100%)    Parameters below as of 25 May 2023 23:37  Patient On (Oxygen Delivery Method): room air    I&O's Summary    24 May 2023 07:01  -  25 May 2023 07:00  --------------------------------------------------------  IN: 1890 mL / OUT: 664 mL / NET: 1226 mL    25 May 2023 07:01  -  26 May 2023 01:01  --------------------------------------------------------  IN: 400 mL / OUT: 400 mL / NET: 0 mL    PHYSICAL EXAM:  General: Pt is laying comfortably in bed, in NAD, on RA  HEENT: PERRL 3mm, EOMI B/L, face symmetric, tongue midline, neck FROM  Cardiovascular: RRR, normal S1 and S2   Respiratory: non-labored breathing, symmetric chest rise   GI: abd soft, NTND   Neuro: A&O x 3, no aphasia, speech clear, no dysmetria, no pronator drift  Strength 5/5 throughout all 4 extremities  Sensation intact to light touch throughout   Vascular: Distal pulses 2+ x4, no calf edema or erythema  Wounds: L VPS wound dressing C/D/I    TUBES/LINES: None    DIET:  [] NPO  [X] Mechanical  [] Tube feeds    LABS:                        11.5   11.17 )-----------( 260      ( 25 May 2023 11:56 )             35.8     05-25    139  |  105  |  7   ----------------------------<  118<H>  4.1   |  25  |  0.54    Ca    8.8      25 May 2023 11:56  Phos  3.2     05-25  Mg     2.1     05-25      PT/INR - ( 25 May 2023 11:56 )   PT: 12.6 sec;   INR: 1.06          PTT - ( 25 May 2023 11:56 )  PTT:28.8 sec    CAPILLARY BLOOD GLUCOSE    Drug Levels: [] N/A  Vancomycin Level, Trough: 9.1 ug/mL (05-19 @ 12:34)    Allergies    penicillins (Anaphylaxis)  dairy products (Angioedema)    Intolerances    MEDICATIONS:    Neuro:  levETIRAcetam 500 milliGRAM(s) Oral two times a day  methocarbamol 500 milliGRAM(s) Oral every 8 hours PRN  metoclopramide Injectable 10 milliGRAM(s) IV Push every 8 hours PRN  ondansetron Injectable 4 milliGRAM(s) IV Push every 6 hours PRN  traMADol 50 milliGRAM(s) Oral every 4 hours PRN  traMADol 25 milliGRAM(s) Oral every 4 hours PRN    OTHER:  artificial  tears Solution 1 Drop(s) Right EYE every 4 hours PRN  benzocaine/menthol Lozenge 1 Lozenge Oral every 4 hours PRN  bisacodyl 5 milliGRAM(s) Oral daily PRN  ofloxacin 0.3% Ophthalmic Solution for OTIC Use 4 Drop(s) Left Ear four times a day  polyethylene glycol 3350 17 Gram(s) Oral two times a day  senna 2 Tablet(s) Oral at bedtime    CULTURES:  Culture Results:   No growth to date (05-23 @ 09:46)  Culture Results:   No growth to date (05-22 @ 07:27)    RADIOLOGY & ADDITIONAL TESTS:  < from: CT Head No Cont (05.25.23 @ 13:11) >  FINDINGS: The CT examination demonstrates the patient to be status post   left frontal angel hole. There is left soft tissue swelling with   subcutaneous gas. Multiple skin staples are present. A left-sided VPS   shunt catheter seen coursing via the left frontal lobe with the tip now   within the left frontal horn. There is new gas along the course of the   prior catheter tract. There is stable gas within the right frontal horn.   There also is new gas along the left frontal convexity. The ventricles   are stable in size. There is no midline shift. The grey white   differentiation appears within normal limits. There is no intracranial   hemorrhage or acute transcortical infarct. There is a empty sella.    The patient is also status post left left temporal craniotomy and left   canal wall down mastoidectomy. There is overlying soft tissue swelling as   well as soft tissue filling the left mastoidectomy bed. There is a mixed   extra-axial fluid collection along the temporal convexity.    IMPRESSION: Patient status post revision of left-sided  shunt catheter   with postsurgical changes. Stable ventricular size.    --- End of Report ---    < end of copied text >      ASSESSMENT:  63 years old female with left otorrhea admitted from ED with severe left sided headache and intermittent dizzinesss for Left craniotomy for repair of left otorrhea, intra op EVD placement on 05/17/23. Now s/p L VPS (Certas @5) (5/24). now s/p VPS proximal (5/25).    VERTIGO; OTORRHEA, LEFT    Family history of hypertension (Mother)    Handoff    MEWS Score    Chronic serous otitis media    Hypertension    Hypercholesteremia    Obesity    Otorrhea of left ear    Trauma    CSF otorrhea    HLD (hyperlipidemia)    CSF otorrhea    Tegmen defect of base of skull    CSF otorrhea    Tegmen defect of base of skull    Repair, CSF leak    Vertigo    Otorrhea, left    Hypertension    Insertion or replacement of external ventricular drain (EVD)     shunt    Revision, ventricular shunt    H/O myringotomy    H/O nasal polypectomy    History of appendectomy    HEADACHE    Hypercholesteremia    Otorrhea of left ear    90+    Room Service Assist    Insertion or replacement of external ventricular drain (EVD)     shunt    Otorrhea, left    Vertigo    Hypertension    Hyperlipidemia    SysAdmin_VstLnk    PLAN:  Neuro:  - Neuro/vitals q4h  - L VPS (Certas @5)   - keppra 500mg BID   - Pain control prn tramadol, robaxin for jaw pain   - post-op CTH 5/24: L frontal VPS catheter terminating in the corpus callosum, not in vetricle, ventricular size stable.   - post op CTH 5/25 complete, stable    Cardio:  - SBP<140, qtc 431 5/19   - Restart home losartan 50 if needed   - Statin held for myalgia     Pulm:   - RA    GI:  - ADAT  - Bowel regimen    Renal:  - voiding  - IVL    Endo:   - ISS     ID:   - ID consulted for empiric meningitis coverage, now off antibiotics   - s/p vanc/cefepime (5/17-5/19)   - periop vanc dose x1 completed     Heme:  - SCDs for DVT ppx/ SQL held for OR     Misc:   - ofloxacin L eardrop bid    Discussed w/ Dr. Ramirez

## 2023-05-26 NOTE — PROGRESS NOTE ADULT - SUBJECTIVE AND OBJECTIVE BOX
SUBJECTIVE: Patient seen and examined at bedside.  There were no acute events overnight.  Patient endorses mild abdominal pain, passing flatus, and voiding without complication.  She denies nausea, vomiting, and stooling thus far.        MEDICATIONS  (PRN):  artificial  tears Solution 1 Drop(s) Right EYE every 4 hours PRN Dry Eyes  benzocaine/menthol Lozenge 1 Lozenge Oral every 4 hours PRN Sore Throat  bisacodyl 5 milliGRAM(s) Oral daily PRN Constipation  methocarbamol 500 milliGRAM(s) Oral every 8 hours PRN muscle/jaw pain  metoclopramide Injectable 10 milliGRAM(s) IV Push every 8 hours PRN nausea/vomiting 2nd line  ondansetron Injectable 4 milliGRAM(s) IV Push every 6 hours PRN Nausea and/or Vomiting  traMADol 50 milliGRAM(s) Oral every 4 hours PRN Severe Pain (7 - 10)  traMADol 25 milliGRAM(s) Oral every 4 hours PRN Moderate Pain (4 - 6)      I&O's Detail    25 May 2023 07:01  -  26 May 2023 07:00  --------------------------------------------------------  IN:    sodium chloride 0.9%: 400 mL  Total IN: 400 mL    OUT:    Voided (mL): 400 mL  Total OUT: 400 mL    Total NET: 0 mL          T(C): 37.1 (05-26-23 @ 05:58), Max: 37.3 (05-25-23 @ 08:15)  HR: 62 (05-26-23 @ 05:58) (55 - 78)  BP: 119/56 (05-26-23 @ 05:58) (112/70 - 156/78)  RR: 18 (05-26-23 @ 05:58) (11 - 20)  SpO2: 95% (05-26-23 @ 05:58) (94% - 100%)    GENERAL: NAD, Resting comfortably in bed, awake, opens eyes spontaneously  HEENT: Left cranial surgery incision sutured closed, MMM, Normal conjunctiva  RESP: Nonlabored breathing on room air, No respiratory distress  CARD: Normal rate, Normal peripheral perfusion  GI: Soft, ND, mild RUQ tenderness, No guarding, No rebound tenderness, laparoscopic incisions with DermaBond are negative for discharge, and surrounding erythema  EXTREM: WWP, No edema, No gross deformity of extremities  SKIN: No rashes, laparoscopic incisions are clean/dry/intact  NEURO: Awake and alert, No focal motor or sensory deficits  PSYCH: Affect and characteristics of appearance, verbalizations, and behaviors are appropriate    LABS:                        10.8   11.33 )-----------( 269      ( 26 May 2023 06:22 )             33.5     05-25    139  |  105  |  7   ----------------------------<  118<H>  4.1   |  25  |  0.54    Ca    8.8      25 May 2023 11:56  Phos  3.2     05-25  Mg     2.1     05-25      PT/INR - ( 25 May 2023 11:56 )   PT: 12.6 sec;   INR: 1.06          PTT - ( 25 May 2023 11:56 )  PTT:28.8 sec      RADIOLOGY & ADDITIONAL STUDIES:      Culture - CSF with Gram Stain (collected 05-23-23 @ 09:46)  Source: .CSF CSF  Gram Stain (05-23-23 @ 12:16):    No organisms seen    No White blood cells  Preliminary Report (05-24-23 @ 10:45):    No growth to date    Culture - CSF with Gram Stain (collected 05-22-23 @ 07:27)  Source: .CSF CSF  Gram Stain (05-22-23 @ 10:34):    No organisms seen    Rare WBC's  Preliminary Report (05-23-23 @ 09:58):    No growth to date

## 2023-05-26 NOTE — DISCHARGE NOTE NURSING/CASE MANAGEMENT/SOCIAL WORK - PATIENT PORTAL LINK FT
You can access the FollowMyHealth Patient Portal offered by [facility] by registering at the following website: [email or web address]. By joining [employer]’s FollowMyHealth portal, you will also be able to view your health information using other applications (apps) compatible with our system.

## 2023-05-26 NOTE — PROGRESS NOTE ADULT - PROVIDER SPECIALTY LIST ADULT
NSICU
Neurosurgery
Surgery
ENT
Infectious Disease
NSICU
Neurosurgery
ENT
Hospitalist
NSICU
Neurosurgery
ENT
NSICU
Neurosurgery
Neurosurgery
Surgery
NSICU
NSICU

## 2023-05-26 NOTE — DISCHARGE NOTE NURSING/CASE MANAGEMENT/SOCIAL WORK - NSDCFUADDAPPT_GEN_ALL_CORE_FT
Please follow up with Dr. Ramirez    Please follow up with Dr. Falcon from ENT     Please follow up with Dr. Mckenzie from General Surgery    Please follow up with your primary care doctor

## 2023-05-26 NOTE — DISCHARGE NOTE NURSING/CASE MANAGEMENT/SOCIAL WORK - NSDCPEFALRISK_GEN_ALL_CORE
For information on Fall & Injury Prevention, visit: [email or web address] OR  [email or web address] OR  https://www.cdc.gov/steadi/patient.html

## 2023-05-26 NOTE — PROGRESS NOTE ADULT - SUBJECTIVE AND OBJECTIVE BOX
OTOLARYNGOLOGY (ENT) PROGRESS NOTE    PATIENT: MARIA FERNANDA VILLARREAL  MRN: [id number]  : 62  [id number]  DATE OF SERVICE:  23  			         ID:MARIA FERNANDA VILLARREAL is a  63yFemale S/P L EVD placement and L temp crani for CSF leak repair w/ ENT    Subjective/ Interval:   No issues overnight, pain minimal, mastoid soft head compression dressing in place, EVD open at 5cc  ; patient seen bedside, dressings in place, EVD clamped until 7  : NAEON. Patient tmax 100.5, otherwise afebrile and vitals stable. Pain well controlled. Denies headache, nausea/vomiting, or dizziness. GRECIA removed. EVD open at 5. Cultures NGTD.  : NAEON. AFVSS. Pain well controlled. Denies headache, nausea/vomiting, or dizziness. EVD open at 5. Cultures NGTD.  : NAEON. AFVSS. Pain well controlled. She reports some tenderness over incision site. EVD remains in place. Cultures remain NGTD.  : NAEON. AFVSS. Pain well controlled. EVD remains in place. She required oxygen overnight while sleeping, which was administered with nasal cannula. Final OR and CSF cultures negative.  : NAEON. AFVSS. Pain well controlled. Denies headache, nausea/vomiting, or dizziness. EVD open at 5. Receiving ear drops. No new complaints.  ; patient seen bedside this morning, continues to receive ear drops, no new complaints, plan for revision VPS as shunt is not in ventricular system on CT scan,   :  Patient seen this morning, continue ear drops, VPS repositioned yesterday ,     ALLERGIES:  penicillins (Anaphylaxis)  dairy products (Angioedema)      MEDICATIONS:  Antiinfectives:     IV fluids:    Hematologic/Anticoagulation:    Pain medications/Neuro:  levETIRAcetam 500 milliGRAM(s) Oral two times a day  methocarbamol 500 milliGRAM(s) Oral every 8 hours PRN  metoclopramide Injectable 10 milliGRAM(s) IV Push every 8 hours PRN  ondansetron Injectable 4 milliGRAM(s) IV Push every 6 hours PRN  traMADol 50 milliGRAM(s) Oral every 4 hours PRN  traMADol 25 milliGRAM(s) Oral every 4 hours PRN    Endocrine Medications:     All other standing medications:   ofloxacin 0.3% Ophthalmic Solution for OTIC Use 4 Drop(s) Left Ear four times a day  polyethylene glycol 3350 17 Gram(s) Oral two times a day  senna 2 Tablet(s) Oral at bedtime    All other PRN medications:  artificial  tears Solution 1 Drop(s) Right EYE every 4 hours PRN  benzocaine/menthol Lozenge 1 Lozenge Oral every 4 hours PRN  bisacodyl 5 milliGRAM(s) Oral daily PRN    Vital Signs Last 24 Hrs  T(C): 37.1 (26 May 2023 05:58), Max: 37.3 (25 May 2023 08:15)  T(F): 98.7 (26 May 2023 05:58), Max: 98.7 (26 May 2023 05:58)  HR: 62 (26 May 2023 05:58) (55 - 78)  BP: 119/56 (26 May 2023 05:58) (112/70 - 156/78)  BP(mean): 102 (25 May 2023 13:46) (88 - 112)  RR: 18 (26 May 2023 05:58) (11 - 20)  SpO2: 95% (26 May 2023 05:58) (94% - 100%)    Parameters below as of 26 May 2023 05:58  Patient On (Oxygen Delivery Method): room air          05- @ 07:01  -  05-26 @ 07:00  --------------------------------------------------------  IN:    sodium chloride 0.9%: 400 mL  Total IN: 400 mL    OUT:    Voided (mL): 400 mL  Total OUT: 400 mL    Total NET: 0 mL        PHYSICAL EXAM:  General: patient seen laying supine in bed in NAD  Neuro: AAOx3, speech clear and fluent, VPS noted   HEENT: EOMI, mastoid soft, incision c/d/i, small edema around superior incision stable   Pulmonary: regular respirations, non labored on room air   Ext: perfusing well  Skin: warm, dry         LABS                       10.8   11.33 )-----------( 269      ( 26 May 2023 06:22 )             33.5        139  |  105  |  7   ----------------------------<  118<H>  4.1   |  25  |  0.54    Ca    8.8      25 May 2023 11:56  Phos  3.2       Mg     2.1                Coagulation Studies-   PT/INR - ( 25 May 2023 11:56 )   PT: 12.6 sec;   INR: 1.06          PTT - ( 25 May 2023 11:56 )  PTT:28.8 sec    Endocrine Panel-  Calcium, Total Serum: 8.8 mg/dL ( @ 11:56)      MICROBIOLOGY:  Culture Results:   No growth to date (23 @ 09:46)  Culture Results:   No growth to date (23 @ 07:27)  Culture Results:   No growth to date (23 @ 19:30)  Culture Results:   No growth (23 @ 16:17)  Culture Results:   No growth (23 @ 16:17)      Culture - CSF with Gram Stain (collected 23 @ 09:46)  Source: .CSF CSF  Gram Stain (23 @ 12:16):    No organisms seen    No White blood cells  Preliminary Report (23 @ 10:45):    No growth to date    Culture - CSF with Gram Stain (collected 23 @ 07:27)  Source: .CSF CSF  Gram Stain (23 @ 10:34):    No organisms seen    Rare WBC's  Preliminary Report (23 @ 09:58):    No growth to date

## 2023-05-26 NOTE — PROGRESS NOTE ADULT - ASSESSMENT
63-year-old female w/ PMHx of HTN, HLD, CSF otorrhea, chronic serous otitis media, osteoma s/p L myringotomy tube (2006, 2018), nasal polyps s/p polypectomy (2013), and appendectomy (1976) presented with L-sided headache and dizziness 2/2 L otorrhea.  Patient is POD2 s/p external ventricular drain and ventriculoperitoneal shunt placement.  There were no acute events overnight and patient denies nausea/vomiting; however, endorses head/abdominal pain and absence of return of bowel function.  She is POD1 s/p RTOR shunt revision.  Patient is afebrile and normotensive; and serum results are unremarkable.  She reports passing flatus and denies nausea and vomiting.  Physical examination with mild RUQ tenderness.        PLAN  - Monitor for return of bowel function  - Surgery Team 4C will continue to follow  - Please page Team 4 at [phone] with any questions and/or clinical changes 63-year-old female w/ PMHx of HTN, HLD, CSF otorrhea, chronic serous otitis media, osteoma s/p L myringotomy tube (2006, 2018), nasal polyps s/p polypectomy (2013), and appendectomy (1976) presented with L-sided headache and dizziness 2/2 L otorrhea.  Patient is POD2 s/p external ventricular drain and ventriculoperitoneal shunt placement.  There were no acute events overnight and patient denies nausea/vomiting; however, endorses head/abdominal pain and absence of return of bowel function.  She is POD1 s/p RTOR shunt revision.  Patient is afebrile and normotensive; and serum results are unremarkable.  She reports passing flatus and denies nausea and vomiting.  Physical examination with mild RUQ tenderness.      PLAN  - No acute indication for surgical intervention  - General Surgery Team 4 will sign-off  - Patient discussed with the Attending Surgeon and the Chief Resident

## 2023-05-26 NOTE — PROGRESS NOTE ADULT - THIS PATIENT HAS THE FOLLOWING CONDITION(S)/DIAGNOSES ON THIS ADMISSION:
None

## 2023-05-26 NOTE — PROGRESS NOTE ADULT - REASON FOR ADMISSION
L. CSF otorrhea.

## 2023-05-30 LAB
CULTURE RESULTS: NO GROWTH — SIGNIFICANT CHANGE UP
SPECIMEN SOURCE: SIGNIFICANT CHANGE UP

## 2023-06-03 ENCOUNTER — EMERGENCY (EMERGENCY)
Facility: HOSPITAL | Age: 63
LOS: 1 days | Discharge: HOME | End: 2023-06-03
Attending: EMERGENCY MEDICINE | Admitting: EMERGENCY MEDICINE
Payer: COMMERCIAL

## 2023-06-03 VITALS
HEART RATE: 58 BPM | HEIGHT: 62 IN | SYSTOLIC BLOOD PRESSURE: 123 MMHG | TEMPERATURE: 98 F | DIASTOLIC BLOOD PRESSURE: 73 MMHG | OXYGEN SATURATION: 97 % | RESPIRATION RATE: 16 BRPM | WEIGHT: 179.9 LBS

## 2023-06-03 VITALS
TEMPERATURE: 98 F | SYSTOLIC BLOOD PRESSURE: 115 MMHG | OXYGEN SATURATION: 97 % | RESPIRATION RATE: 18 BRPM | DIASTOLIC BLOOD PRESSURE: 86 MMHG | HEART RATE: 58 BPM

## 2023-06-03 DIAGNOSIS — Z90.49 ACQUIRED ABSENCE OF OTHER SPECIFIED PARTS OF DIGESTIVE TRACT: ICD-10-CM

## 2023-06-03 DIAGNOSIS — Z98.2 PRESENCE OF CEREBROSPINAL FLUID DRAINAGE DEVICE: ICD-10-CM

## 2023-06-03 DIAGNOSIS — Z91.011 ALLERGY TO MILK PRODUCTS: ICD-10-CM

## 2023-06-03 DIAGNOSIS — R10.9 UNSPECIFIED ABDOMINAL PAIN: ICD-10-CM

## 2023-06-03 DIAGNOSIS — Z90.49 ACQUIRED ABSENCE OF OTHER SPECIFIED PARTS OF DIGESTIVE TRACT: Chronic | ICD-10-CM

## 2023-06-03 DIAGNOSIS — Z98.890 OTHER SPECIFIED POSTPROCEDURAL STATES: Chronic | ICD-10-CM

## 2023-06-03 DIAGNOSIS — Z88.0 ALLERGY STATUS TO PENICILLIN: ICD-10-CM

## 2023-06-03 DIAGNOSIS — Z86.69 PERSONAL HISTORY OF OTHER DISEASES OF THE NERVOUS SYSTEM AND SENSE ORGANS: ICD-10-CM

## 2023-06-03 DIAGNOSIS — E78.00 PURE HYPERCHOLESTEROLEMIA, UNSPECIFIED: ICD-10-CM

## 2023-06-03 DIAGNOSIS — Z87.828 PERSONAL HISTORY OF OTHER (HEALED) PHYSICAL INJURY AND TRAUMA: ICD-10-CM

## 2023-06-03 DIAGNOSIS — I10 ESSENTIAL (PRIMARY) HYPERTENSION: ICD-10-CM

## 2023-06-03 DIAGNOSIS — Z86.018 PERSONAL HISTORY OF OTHER BENIGN NEOPLASM: ICD-10-CM

## 2023-06-03 LAB
ALBUMIN SERPL ELPH-MCNC: 4.2 G/DL — SIGNIFICANT CHANGE UP (ref 3.3–5)
ALP SERPL-CCNC: 65 U/L — SIGNIFICANT CHANGE UP (ref 40–120)
ALT FLD-CCNC: 24 U/L — SIGNIFICANT CHANGE UP (ref 10–45)
ANION GAP SERPL CALC-SCNC: 11 MMOL/L — SIGNIFICANT CHANGE UP (ref 5–17)
AST SERPL-CCNC: 15 U/L — SIGNIFICANT CHANGE UP (ref 10–40)
BASOPHILS # BLD AUTO: 0.03 K/UL — SIGNIFICANT CHANGE UP (ref 0–0.2)
BASOPHILS NFR BLD AUTO: 0.4 % — SIGNIFICANT CHANGE UP (ref 0–2)
BILIRUB SERPL-MCNC: 0.7 MG/DL — SIGNIFICANT CHANGE UP (ref 0.2–1.2)
BUN SERPL-MCNC: 11 MG/DL — SIGNIFICANT CHANGE UP (ref 7–23)
CALCIUM SERPL-MCNC: 9.6 MG/DL — SIGNIFICANT CHANGE UP (ref 8.4–10.5)
CHLORIDE SERPL-SCNC: 104 MMOL/L — SIGNIFICANT CHANGE UP (ref 96–108)
CO2 SERPL-SCNC: 31 MMOL/L — SIGNIFICANT CHANGE UP (ref 22–31)
CREAT SERPL-MCNC: 0.51 MG/DL — SIGNIFICANT CHANGE UP (ref 0.5–1.3)
EGFR: 105 ML/MIN/1.73M2 — SIGNIFICANT CHANGE UP
EOSINOPHIL # BLD AUTO: 0.16 K/UL — SIGNIFICANT CHANGE UP (ref 0–0.5)
EOSINOPHIL NFR BLD AUTO: 2.1 % — SIGNIFICANT CHANGE UP (ref 0–6)
GLUCOSE SERPL-MCNC: 96 MG/DL — SIGNIFICANT CHANGE UP (ref 70–99)
HCT VFR BLD CALC: 38.8 % — SIGNIFICANT CHANGE UP (ref 34.5–45)
HGB BLD-MCNC: 12.2 G/DL — SIGNIFICANT CHANGE UP (ref 11.5–15.5)
IMM GRANULOCYTES NFR BLD AUTO: 0.5 % — SIGNIFICANT CHANGE UP (ref 0–0.9)
LYMPHOCYTES # BLD AUTO: 2.06 K/UL — SIGNIFICANT CHANGE UP (ref 1–3.3)
LYMPHOCYTES # BLD AUTO: 27.5 % — SIGNIFICANT CHANGE UP (ref 13–44)
MCHC RBC-ENTMCNC: 28.3 PG — SIGNIFICANT CHANGE UP (ref 27–34)
MCHC RBC-ENTMCNC: 31.4 GM/DL — LOW (ref 32–36)
MCV RBC AUTO: 90 FL — SIGNIFICANT CHANGE UP (ref 80–100)
MONOCYTES # BLD AUTO: 0.71 K/UL — SIGNIFICANT CHANGE UP (ref 0–0.9)
MONOCYTES NFR BLD AUTO: 9.5 % — SIGNIFICANT CHANGE UP (ref 2–14)
NEUTROPHILS # BLD AUTO: 4.48 K/UL — SIGNIFICANT CHANGE UP (ref 1.8–7.4)
NEUTROPHILS NFR BLD AUTO: 60 % — SIGNIFICANT CHANGE UP (ref 43–77)
NRBC # BLD: 0 /100 WBCS — SIGNIFICANT CHANGE UP (ref 0–0)
PLATELET # BLD AUTO: 339 K/UL — SIGNIFICANT CHANGE UP (ref 150–400)
POTASSIUM SERPL-MCNC: 3.8 MMOL/L — SIGNIFICANT CHANGE UP (ref 3.5–5.3)
POTASSIUM SERPL-SCNC: 3.8 MMOL/L — SIGNIFICANT CHANGE UP (ref 3.5–5.3)
PROT SERPL-MCNC: 7 G/DL — SIGNIFICANT CHANGE UP (ref 6–8.3)
RBC # BLD: 4.31 M/UL — SIGNIFICANT CHANGE UP (ref 3.8–5.2)
RBC # FLD: 13 % — SIGNIFICANT CHANGE UP (ref 10.3–14.5)
SODIUM SERPL-SCNC: 146 MMOL/L — HIGH (ref 135–145)
SURGICAL PATHOLOGY STUDY: SIGNIFICANT CHANGE UP
WBC # BLD: 7.48 K/UL — SIGNIFICANT CHANGE UP (ref 3.8–10.5)
WBC # FLD AUTO: 7.48 K/UL — SIGNIFICANT CHANGE UP (ref 3.8–10.5)

## 2023-06-03 PROCEDURE — 96372 THER/PROPH/DIAG INJ SC/IM: CPT | Mod: XU

## 2023-06-03 PROCEDURE — 71045 X-RAY EXAM CHEST 1 VIEW: CPT

## 2023-06-03 PROCEDURE — 99284 EMERGENCY DEPT VISIT MOD MDM: CPT

## 2023-06-03 PROCEDURE — 99282 EMERGENCY DEPT VISIT SF MDM: CPT

## 2023-06-03 PROCEDURE — 99284 EMERGENCY DEPT VISIT MOD MDM: CPT | Mod: 25

## 2023-06-03 PROCEDURE — 74177 CT ABD & PELVIS W/CONTRAST: CPT | Mod: 26,QQ

## 2023-06-03 PROCEDURE — 96361 HYDRATE IV INFUSION ADD-ON: CPT

## 2023-06-03 PROCEDURE — 71046 X-RAY EXAM CHEST 2 VIEWS: CPT | Mod: 26

## 2023-06-03 PROCEDURE — 80053 COMPREHEN METABOLIC PANEL: CPT

## 2023-06-03 PROCEDURE — 74018 RADEX ABDOMEN 1 VIEW: CPT | Mod: 26

## 2023-06-03 PROCEDURE — 70250 X-RAY EXAM OF SKULL: CPT | Mod: 26

## 2023-06-03 PROCEDURE — 36415 COLL VENOUS BLD VENIPUNCTURE: CPT

## 2023-06-03 PROCEDURE — 70370 THROAT X-RAY & FLUOROSCOPY: CPT | Mod: 26

## 2023-06-03 PROCEDURE — 85025 COMPLETE CBC W/AUTO DIFF WBC: CPT

## 2023-06-03 PROCEDURE — 74018 RADEX ABDOMEN 1 VIEW: CPT

## 2023-06-03 PROCEDURE — 70250 X-RAY EXAM OF SKULL: CPT

## 2023-06-03 PROCEDURE — 96360 HYDRATION IV INFUSION INIT: CPT | Mod: XU

## 2023-06-03 PROCEDURE — 74177 CT ABD & PELVIS W/CONTRAST: CPT | Mod: QQ

## 2023-06-03 RX ORDER — IOHEXOL 300 MG/ML
30 INJECTION, SOLUTION INTRAVENOUS ONCE
Refills: 0 | Status: COMPLETED | OUTPATIENT
Start: 2023-06-03 | End: 2023-06-03

## 2023-06-03 RX ORDER — TRAMADOL HYDROCHLORIDE 50 MG/1
100 TABLET ORAL ONCE
Refills: 0 | Status: DISCONTINUED | OUTPATIENT
Start: 2023-06-03 | End: 2023-06-03

## 2023-06-03 RX ORDER — TRAMADOL HYDROCHLORIDE 50 MG/1
1 TABLET ORAL
Qty: 3 | Refills: 0
Start: 2023-06-03 | End: 2023-06-03

## 2023-06-03 RX ORDER — DIATRIZOATE MEGLUMINE 180 MG/ML
30 INJECTION, SOLUTION INTRAVESICAL ONCE
Refills: 0 | Status: COMPLETED | OUTPATIENT
Start: 2023-06-03 | End: 2023-06-03

## 2023-06-03 RX ORDER — TRAMADOL HYDROCHLORIDE 50 MG/1
1 TABLET ORAL
Qty: 15 | Refills: 0
Start: 2023-06-03 | End: 2023-06-07

## 2023-06-03 RX ORDER — KETOROLAC TROMETHAMINE 30 MG/ML
30 SYRINGE (ML) INJECTION ONCE
Refills: 0 | Status: DISCONTINUED | OUTPATIENT
Start: 2023-06-03 | End: 2023-06-03

## 2023-06-03 RX ORDER — IBUPROFEN 200 MG
600 TABLET ORAL ONCE
Refills: 0 | Status: COMPLETED | OUTPATIENT
Start: 2023-06-03 | End: 2023-06-03

## 2023-06-03 RX ORDER — SODIUM CHLORIDE 9 MG/ML
1000 INJECTION INTRAMUSCULAR; INTRAVENOUS; SUBCUTANEOUS ONCE
Refills: 0 | Status: COMPLETED | OUTPATIENT
Start: 2023-06-03 | End: 2023-06-03

## 2023-06-03 RX ORDER — ACETAMINOPHEN 500 MG
650 TABLET ORAL ONCE
Refills: 0 | Status: COMPLETED | OUTPATIENT
Start: 2023-06-03 | End: 2023-06-03

## 2023-06-03 RX ORDER — MORPHINE SULFATE 50 MG/1
4 CAPSULE, EXTENDED RELEASE ORAL ONCE
Refills: 0 | Status: DISCONTINUED | OUTPATIENT
Start: 2023-06-03 | End: 2023-06-03

## 2023-06-03 RX ADMIN — IOHEXOL 30 MILLILITER(S): 300 INJECTION, SOLUTION INTRAVENOUS at 15:00

## 2023-06-03 RX ADMIN — Medication 30 MILLIGRAM(S): at 14:35

## 2023-06-03 RX ADMIN — Medication 600 MILLIGRAM(S): at 18:30

## 2023-06-03 RX ADMIN — Medication 650 MILLIGRAM(S): at 18:30

## 2023-06-03 RX ADMIN — TRAMADOL HYDROCHLORIDE 100 MILLIGRAM(S): 50 TABLET ORAL at 18:30

## 2023-06-03 RX ADMIN — Medication 30 MILLIGRAM(S): at 12:53

## 2023-06-03 RX ADMIN — SODIUM CHLORIDE 1000 MILLILITER(S): 9 INJECTION INTRAMUSCULAR; INTRAVENOUS; SUBCUTANEOUS at 14:35

## 2023-06-03 RX ADMIN — SODIUM CHLORIDE 2000 MILLILITER(S): 9 INJECTION INTRAMUSCULAR; INTRAVENOUS; SUBCUTANEOUS at 12:53

## 2023-06-03 NOTE — ED PROVIDER NOTE - PATIENT PORTAL LINK FT
You can access the FollowMyHealth Patient Portal offered by [facility] by registering at the following website: [email or web address]. By joining [email or web address]’s FollowMyHealth portal, you will also be able to view your health information using other applications (apps) compatible with our system.

## 2023-06-03 NOTE — ED ADULT NURSE NOTE - OBJECTIVE STATEMENT
Pt is 63 y.o female pt walked in from home complaining of L sided abdominal pain starting yesterday at 6AM. Pt has recent  brain shunt revision on 5/25 for hydrocephalus, pt also reports multiple years of " ear drainage".  Pt A&Ox4. Pt is conversive in full sentences. LBM today. Staples noted to L head. Sent by MD Ramirez for further eval. Denies cp, sob, fever, chills, n/v/d,  sx. Assessment ongoing. IV inserted and labs drawn. Will cont to monitor.

## 2023-06-03 NOTE — ED ADULT TRIAGE NOTE - CHIEF COMPLAINT QUOTE
Pt presents to ED C/O L sided abd pain starting yesterday at 6AM. Pt has recent " brain shunt revision" on 5/25 S/P multiple years of " ear drainage".  Pt neuro intact. LBM today. Staples noted to RAUL orosco. Sent by MD Ramirez for further eval.

## 2023-06-03 NOTE — ED ADULT NURSE NOTE - NSFALLUNIVINTERV_ED_ALL_ED
Bed/Stretcher in lowest position, wheels locked, appropriate side rails in place/Call bell, personal items and telephone in reach/Instruct patient to call for assistance before getting out of bed/chair/stretcher/Non-slip footwear applied when patient is off stretcher/[place] to call system/Physically safe environment - no spills, clutter or unnecessary equipment/Purposeful proactive rounding/Room/bathroom lighting operational, light cord in reach

## 2023-06-03 NOTE — ED PROVIDER NOTE - CLINICAL SUMMARY MEDICAL DECISION MAKING FREE TEXT BOX
62 y/o F, PMHx of chronic left mastoiditis, chronic otitis media, left ear w/ CSF otorrhea s/p EVD placement and L temp craniotomy for CSF leak repair w/ ENT, now here for left sided abdominal pain since 4AM this morning. On exam, pt has +mild tenderness to Left mid abdomen. Surgical site staples are clean and intact. Will consult w/ neurosurgery. Will order pain meds, labs, xray shunt series, and reassess.

## 2023-06-03 NOTE — ED ADULT NURSE REASSESSMENT NOTE - NS ED NURSE REASSESS COMMENT FT1
Pt reports presence of pain 4/10. Dr Solis updated. Awaiting for orders. Assessment ongoing. Will cont to monitor.
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
## 2023-06-03 NOTE — ED PROVIDER NOTE - OBJECTIVE STATEMENT
64 y/o F, PMHx of chronic left mastoiditis, chronic otitis media, left ear w/ CSF otorrhea s/p EVD placement and L temp craniotomy for CSF leak repair w/ ENT, follows up w/ Dr. Ramirez, recent shunt drained, now presenting to the ED c/o of left mid abdominal pain since 4AM this morning. Pt reports she never had such pain before. She reports taking Tramadol and Tylenol without relief. She denies fever. She contacted Dr. Ramirez about pain and was referred to the ED for further eval.

## 2023-06-05 PROBLEM — Z51.89 VISIT FOR WOUND CHECK: Status: ACTIVE | Noted: 2023-06-05

## 2023-06-05 PROBLEM — Z09 POSTOP CHECK: Status: ACTIVE | Noted: 2023-06-05

## 2023-06-06 ENCOUNTER — APPOINTMENT (OUTPATIENT)
Dept: NEUROSURGERY | Facility: CLINIC | Age: 63
End: 2023-06-06
Payer: COMMERCIAL

## 2023-06-06 VITALS
OXYGEN SATURATION: 98 % | DIASTOLIC BLOOD PRESSURE: 83 MMHG | BODY MASS INDEX: 33.66 KG/M2 | HEART RATE: 67 BPM | HEIGHT: 63 IN | RESPIRATION RATE: 18 BRPM | SYSTOLIC BLOOD PRESSURE: 142 MMHG | WEIGHT: 190 LBS | TEMPERATURE: 97.2 F

## 2023-06-06 DIAGNOSIS — Z09 ENCOUNTER FOR FOLLOW-UP EXAMINATION AFTER COMPLETED TREATMENT FOR CONDITIONS OTHER THAN MALIGNANT NEOPLASM: ICD-10-CM

## 2023-06-06 DIAGNOSIS — R10.9 UNSPECIFIED ABDOMINAL PAIN: ICD-10-CM

## 2023-06-06 DIAGNOSIS — Z51.89 ENCOUNTER FOR OTHER SPECIFIED AFTERCARE: ICD-10-CM

## 2023-06-06 LAB
CULTURE RESULTS: NO GROWTH — SIGNIFICANT CHANGE UP
CULTURE RESULTS: NO GROWTH — SIGNIFICANT CHANGE UP
SPECIMEN SOURCE: SIGNIFICANT CHANGE UP
SPECIMEN SOURCE: SIGNIFICANT CHANGE UP

## 2023-06-06 PROCEDURE — 99024 POSTOP FOLLOW-UP VISIT: CPT

## 2023-06-06 RX ORDER — BISMUTH SUBSALICYLATE 525 MG/30ML
200-200-20 LIQUID ORAL
Qty: 1 | Refills: 0 | Status: ACTIVE | COMMUNITY
Start: 2023-06-06 | End: 1900-01-01

## 2023-06-06 NOTE — PHYSICAL EXAM
[General Appearance - Alert] : alert [General Appearance - In No Acute Distress] : in no acute distress [Oriented To Time, Place, And Person] : oriented to person, place, and time [Impaired Insight] : insight and judgment were intact [Affect] : the affect was normal [Memory Recent] : recent memory was not impaired [Motor Tone] : muscle tone was normal in all four extremities [Motor Strength] : muscle strength was normal in all four extremities [Sclera] : the sclera and conjunctiva were normal [PERRL With Normal Accommodation] : pupils were equal in size, round, reactive to light, with normal accommodation [Extraocular Movements] : extraocular movements were intact [Neck Appearance] : the appearance of the neck was normal [] : no respiratory distress [Respiration, Rhythm And Depth] : normal respiratory rhythm and effort [Abnormal Walk] : normal gait [Skin Color & Pigmentation] : normal skin color and pigmentation [Clean] : clean [Dry] : dry [Healing Well] : healing well [Staple Intact] : closed with intact staples [Normal Skin] : normal [Erythema] : not erythematous [Warm] : not warm [FreeTextEntry5] : CN II-XII grossly intact with left hearing loss noted [Abdomen Soft] : soft [Abdomen Tenderness] : non-tender [No CVA Tenderness] : no ~M costovertebral angle tenderness

## 2023-06-06 NOTE — DATA REVIEWED
[de-identified] : \par \par ACC: [id number] EXAM: CT BRAIN ORDERED BY: TERESA MARTINEZ\par \par PROCEDURE DATE: 05/25/2023\par \par \par \par INTERPRETATION: PROCEDURE: CT head without intravenous contrast\par \par INDICATION: Status post VPS; proximal catheter revision\par \par TECHNIQUE: Multiple axial images were obtained at 5 mm intervals from the skull base to the vertex. Sagittal and coronal reformatted images were obtained from the axial data set. The images were reviewed in brain and bone windows.\par \par COMPARISON: CT head 5/24/2023\par \par FINDINGS: The CT examination demonstrates the patient to be status post left frontal angel hole. There is left soft tissue swelling with subcutaneous gas. Multiple skin staples are present. A left-sided VPS shunt catheter seen coursing via the left frontal lobe with the tip now within the left frontal horn. There is new gas along the course of the prior catheter tract. There is stable gas within the right frontal horn. There also is new gas along the left frontal convexity. The ventricles are stable in size. There is no midline shift. The grey white differentiation appears within normal limits. There is no intracranial hemorrhage or acute transcortical infarct. There is a empty sella.\par \par The patient is also status post left left temporal craniotomy and left canal wall down mastoidectomy. There is overlying soft tissue swelling as well as soft tissue filling the left mastoidectomy bed. There is a mixed extra-axial fluid collection along the temporal convexity.\par \par IMPRESSION: Patient status post revision of left-sided  shunt catheter with postsurgical changes. Stable ventricular size.\par \par --- End of Report ---\par \par

## 2023-06-06 NOTE — ASSESSMENT
[FreeTextEntry1] : 62 y/o female with PMHx of HTN, HLD, left ear mastoidectomy, cholesteatoma with prior surgeries 2006 and 2016 who presented to [facility] ER 5/16/23 with c/o gradual onset of left-sided headache nonradiating in nature focused around left ear with ongoing otorrhea; s/p left craniotomy for repair of left otorrhea, intra op EVD placement on 5/17/23. Now s/p Left VPS (Certas @5) 5/24/23, with subsequent proximal revision of VPS 5/25/23 with Dr. Ramirze. \par She recently returned to ER 6/3/23 with c/o sharp left abdominal pain. CT abdomen with mild inflammatory changes at the umbilicus. Continues to have intermittent left abdominal pains. Has been having regular BMs but not passing gas. \par \par VPS pumps and refills, setting checked @ 5. \par Incision CDI, staples removed. \par \par Ordered Maalox and plan made for her to call in 2 days if no improvement. \par \par Educated no bending, lifting,  or strenuous activities for 6 weeks. Discussed walking is recommended as tolerated.\par Educated to shower daily. Wash wound with mild or baby shampoo. Pat incision dry with separate dry towel. \par Report all S/S infection including drainage, redness, warmth to touch of incision, chills. \par No soaking of incision (ex: baths, tubs, pools) for 6 weeks from the time of surgery. \par \par She should RTC for f/u with Dr. Ramirez in 4 weeks for 6 week post op follow- up. \par \par Patient and family member verbalize understanding of today’s discussion and next steps in treatment plan. \par  \par

## 2023-06-06 NOTE — REVIEW OF SYSTEMS
[Fever] : no fever [Chills] : no chills [Chest Pain] : no chest pain [Palpitations] : no palpitations [Shortness Of Breath] : no shortness of breath [As Noted in HPI] : as noted in HPI

## 2023-06-06 NOTE — REASON FOR VISIT
[Family Member] : family member [de-identified] : left craniotomy for repair of left otorrhea, intra op EVD placement on 5/17/23. Now s/p Left VPS (Certas @5) 5/24/23, with subsequent proximal revision of VPS 5/25/23.

## 2023-06-06 NOTE — HISTORY OF PRESENT ILLNESS
[FreeTextEntry1] : 64 y/o female with PMHx of HTN, HLD, left ear mastoidectomy, cholesteatoma with prior surgeries 2006 and 2016 who presented to [facility] ER 5/16/23 with c/o gradual onset of left-sided headache nonradiating in nature focused around left ear with ongoing otorrhea; s/p left craniotomy for repair of left otorrhea, intra op EVD placement on 5/17/23. Now s/p Left VPS (Certas @5) 5/24/23, with subsequent proximal revision of VPS 5/25/23 with Dr. Ramirez. \par \par Hospital course uncomplicated and pt dc home 5/26/23. \par \par 6/3/23 pt presented to [facility] ER with c/o of severe left mid abdominal pain, no relief with Tramadol and Tylenol. CT abdomen with mild inflammatory changes at the umbilicus. Dc with outpatient f/u. \par \par TODAY pt presents for post op evaluation and wound check. \par She endorses continued left sided abdominal pain that comes and goes. When in pain, is sharp/ severe. \par She has been having regular BMs but denies passing any gas in a few days. \par Denies headaches, dizziness. \par Denies signs of any postop wound infection which could include but not limited to redness, swelling, purulent drainage. Denies chest pain, shortness of breath, unilateral leg edema. \par She saw Dr. Falcon who removed sutures along ear. \par \par

## 2023-06-07 DIAGNOSIS — Z88.0 ALLERGY STATUS TO PENICILLIN: ICD-10-CM

## 2023-06-07 DIAGNOSIS — H72.92 UNSPECIFIED PERFORATION OF TYMPANIC MEMBRANE, LEFT EAR: ICD-10-CM

## 2023-06-07 DIAGNOSIS — M79.10 MYALGIA, UNSPECIFIED SITE: ICD-10-CM

## 2023-06-07 DIAGNOSIS — E78.5 HYPERLIPIDEMIA, UNSPECIFIED: ICD-10-CM
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
## 2023-06-07 DIAGNOSIS — T85.02XA DISPLACEMENT OF VENTRICULAR INTRACRANIAL (COMMUNICATING) SHUNT, INITIAL ENCOUNTER: ICD-10-CM

## 2023-06-07 DIAGNOSIS — G96.01 CRANIAL CEREBROSPINAL FLUID LEAK, SPONTANEOUS: ICD-10-CM

## 2023-06-07 DIAGNOSIS — H70.12 CHRONIC MASTOIDITIS, LEFT EAR: ICD-10-CM

## 2023-06-07 DIAGNOSIS — E66.9 OBESITY, UNSPECIFIED: ICD-10-CM

## 2023-06-07 DIAGNOSIS — Q01.8 ENCEPHALOCELE OF OTHER SITES: ICD-10-CM

## 2023-06-07 DIAGNOSIS — H65.22 CHRONIC SEROUS OTITIS MEDIA, LEFT EAR: ICD-10-CM

## 2023-06-07 DIAGNOSIS — G91.9 HYDROCEPHALUS, UNSPECIFIED: ICD-10-CM
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
## 2023-06-07 DIAGNOSIS — I10 ESSENTIAL (PRIMARY) HYPERTENSION: ICD-10-CM

## 2023-06-22 ENCOUNTER — APPOINTMENT (OUTPATIENT)
Dept: CT IMAGING | Facility: HOSPITAL | Age: 63
End: 2023-06-22

## 2023-06-22 ENCOUNTER — APPOINTMENT (OUTPATIENT)
Dept: NEUROSURGERY | Facility: CLINIC | Age: 63
End: 2023-06-22
Payer: COMMERCIAL

## 2023-06-22 VITALS
HEART RATE: 75 BPM | OXYGEN SATURATION: 98 % | SYSTOLIC BLOOD PRESSURE: 133 MMHG | TEMPERATURE: 97 F | DIASTOLIC BLOOD PRESSURE: 84 MMHG

## 2023-06-22 PROCEDURE — 99212 OFFICE O/P EST SF 10 MIN: CPT | Mod: 24

## 2023-06-23 NOTE — PHYSICAL EXAM
[General Appearance - Alert] : alert [General Appearance - Well Nourished] : well nourished [General Appearance - Well-Appearing] : healthy appearing [Clean] : clean [Healing Well] : healing well [Well-Healed] : well-healed [No Drainage] : without drainage [Normal Skin] : normal [Oriented To Time, Place, And Person] : oriented to person, place, and time [Place] : oriented to place [Time] : oriented to time [Short Term Intact] : short term memory intact [Remote Intact] : remote memory intact [Span Intact] : the attention span was normal [Fluency] : fluency intact [Comprehension] : comprehension intact [Reading] : reading intact [I: Normal Smell] : smell intact bilaterally [Cranial Nerves Optic (II)] : visual acuity intact bilaterally,  pupils equal round and reactive to light [Cranial Nerves Oculomotor (III)] : extraocular motion intact [Cranial Nerves Facial (VII)] : face symmetrical [Cranial Nerves Vestibulocochlear (VIII)] : hearing was intact bilaterally [Cranial Nerves Accessory (XI - Cranial And Spinal)] : head turning and shoulder shrug symmetric [Cranial Nerves Hypoglossal (XII)] : there was no tongue deviation with protrusion [Motor Tone] : muscle tone was normal in all four extremities [Motor Strength] : muscle strength was normal in all four extremities [PERRL With Normal Accommodation] : pupils were equal in size, round, reactive to light, with normal accommodation [Extraocular Movements] : extraocular movements were intact [Full Visual Field] : full visual field [FreeTextEntry5] : decreased sensation around incision [FreeTextEntry1] : right sclera erythema

## 2023-06-23 NOTE — HISTORY OF PRESENT ILLNESS
[FreeTextEntry1] : 64 y/o female with PMHx of HTN, HLD, left ear mastoidectomy, cholesteatoma with prior surgeries 2006 and 2016 who presented to [facility] ER 5/16/23 with c/o gradual onset of left-sided headache nonradiating in nature focused around left ear with ongoing otorrhea; s/p left craniotomy for repair of left otorrhea, intra op EVD placement on 5/17/23. Now s/p Left VPS (Certas @5) 5/24/23, with subsequent proximal revision of VPS 5/25/23 with Dr. Ramirez. \par \par Hospital course uncomplicated and pt dc home 5/26/23. \par \par 6/3/23 pt presented to [facility] ER with c/o of severe left mid abdominal pain, no relief with Tramadol and Tylenol. CT abdomen with mild inflammatory changes at the umbilicus. Dc with outpatient f/u. \par \par 6/6/23 post op with NP. \par Incision CDI, Certas confirmed @5. Sutures along ear removed by Terrie. Raymond removed. \par Continues to have left sided abdominal pain that comes and goes that is sharp/ severe. Has been having BMs regularly but not passing gas. \par Maalox ordered.\par \par TODAY pt returns for follow- up. Patient reports intermittent headache that increases with coughing. reports abnormal pain improving. Right eye erythema to sclera. Certa confirmed at 5. \par \par Plan:\par Dr Martinez team to see patient for right eye\par HCT within the week\par return to the office after HCT done to evaluate need to VPS adjustment\par

## 2023-06-23 NOTE — REASON FOR VISIT
[de-identified] : status post left craniotomy for repair of left otorrhea, intra op EVD placement on 5/17/23. Now s/p Left VPS (Certas @5) 5/24/23, with subsequent proximal revision of VPS 5/25/23

## 2023-07-17 ENCOUNTER — NON-APPOINTMENT (OUTPATIENT)
Age: 63
End: 2023-07-17

## 2023-07-21 ENCOUNTER — APPOINTMENT (OUTPATIENT)
Dept: OPHTHALMOLOGY | Facility: CLINIC | Age: 63
End: 2023-07-21

## 2023-08-20 NOTE — DISCHARGE NOTE PROVIDER - NSDCDCMDCOMP_GEN_ALL_CORE
This is a 68y/o woman with PMHx HTN, DM, and HLD, presenting now with worsening generalized weakness, dizziness (both room spinning and lightheaded), and occipital headaches, walking with cane for the past several years, requiring assistance from her aid for the past several months now, one episode of LOC 3wks ago with associated room-spinning dizziness, normal coordination on examination, abnormal rhomberg, unsteady gait, diminished sensation to L leg in L5 and S1 distribution, concern for posterior circulation stroke, will check CT head non-con, and CTA head and neck, will check electrolytes, troponin, VBG, urine.  Plan to give IV fluids, tylenol for pain control, consult neuro, follow up results, and reassess.
This document is complete and the patient is ready for discharge.

## 2023-09-13 ENCOUNTER — NON-APPOINTMENT (OUTPATIENT)
Age: 63
End: 2023-09-13

## 2023-09-25 ENCOUNTER — APPOINTMENT (OUTPATIENT)
Dept: OPHTHALMOLOGY | Facility: CLINIC | Age: 63
End: 2023-09-25

## 2023-10-31 NOTE — H&P PST ADULT - PROBLEM SELECTOR PLAN 1
Negative Left Myringotomy & Tube scheduled for 10/19/2017.  Pre-op instructions given. Pt verbalized understanding.  Pepcid given for GI prophylaxis.  Abnormal EKG - comparison EKG on file.  Medical clearance requested - including copy of Echo/Stress if available Scheduled to have removal of glass right ring finger, possible nerve repair on 4/23/18.   labs pending, EKG in chart.   Preop instructions provided to pt with famotidine.

## 2023-12-24 NOTE — REASON FOR VISIT
[FreeTextEntry1] : 62 y/o female with PMHx of HTN, HLD, left ear mastoidectomy, cholesteatoma with prior surgeries 2006 and 2016 who presented to [facility] ER 5/16/23 with c/o gradual onset of left-sided headache nonradiating in nature focused around left ear with ongoing otorrhea;  5/17/23: s/p left craniotomy for repair of left otorrhea, intra op EVD placement on 5/17/23. 5/24/23 s/p Left VPS (Certas @5)  5/25/23 subsequent proximal revision of VPS with Dr. aRmirez.  5/26/23 Hospital course uncomplicated and pt dc home   6/3/23 pt presented to [facility] ER with c/o of severe left mid abdominal pain, no relief with Tramadol and Tylenol. CT abdomen with mild inflammatory changes at the umbilicus. Dc with outpatient f/u.  6/6/23 post op with NP,  Incision CDI, Certas confirmed @5. Sutures along ear removed by Terrie. Corry removed.  6/22/23 Certa confirmed at 5.  9/13/23 Pt brought to ED by 911 for change in vision to right eye and left hang went numb  TODAY:

## 2023-12-24 NOTE — ASSESSMENT
[FreeTextEntry1] : I, Dr. Ramirez, personally performed the evaluation and management (E/M) services for this established patient who presents today ____ That E/M includes conducting the examination, assessing all new/exacerbated conditions, and establishing a new plan of care. Today, my ACP, ___ was here to observe my evaluation and management services for this new problem/exacerbated condition to be followed going forward.  PLAN: -

## 2023-12-28 ENCOUNTER — APPOINTMENT (OUTPATIENT)
Dept: NEUROSURGERY | Facility: CLINIC | Age: 63
End: 2023-12-28

## 2023-12-28 DIAGNOSIS — Z86.19 PERSONAL HISTORY OF OTHER INFECTIOUS AND PARASITIC DISEASES: ICD-10-CM

## 2024-01-07 ENCOUNTER — OUTPATIENT (OUTPATIENT)
Dept: OUTPATIENT SERVICES | Facility: HOSPITAL | Age: 64
LOS: 1 days | End: 2024-01-07
Payer: COMMERCIAL

## 2024-01-07 ENCOUNTER — APPOINTMENT (OUTPATIENT)
Dept: CT IMAGING | Facility: HOSPITAL | Age: 64
End: 2024-01-07

## 2024-01-07 DIAGNOSIS — Z98.890 OTHER SPECIFIED POSTPROCEDURAL STATES: Chronic | ICD-10-CM

## 2024-01-07 DIAGNOSIS — Z90.49 ACQUIRED ABSENCE OF OTHER SPECIFIED PARTS OF DIGESTIVE TRACT: Chronic | ICD-10-CM

## 2024-01-07 PROCEDURE — 70450 CT HEAD/BRAIN W/O DYE: CPT | Mod: 26

## 2024-01-07 PROCEDURE — 70450 CT HEAD/BRAIN W/O DYE: CPT

## 2024-01-08 ENCOUNTER — NON-APPOINTMENT (OUTPATIENT)
Age: 64
End: 2024-01-08

## 2024-01-18 ENCOUNTER — APPOINTMENT (OUTPATIENT)
Dept: NEUROSURGERY | Facility: CLINIC | Age: 64
End: 2024-01-18
Payer: COMMERCIAL

## 2024-01-18 VITALS
DIASTOLIC BLOOD PRESSURE: 85 MMHG | BODY MASS INDEX: 33.66 KG/M2 | RESPIRATION RATE: 18 BRPM | HEART RATE: 53 BPM | SYSTOLIC BLOOD PRESSURE: 123 MMHG | OXYGEN SATURATION: 97 % | HEIGHT: 63 IN | WEIGHT: 190 LBS | TEMPERATURE: 97.1 F

## 2024-01-18 DIAGNOSIS — Z98.2 PRESENCE OF CEREBROSPINAL FLUID DRAINAGE DEVICE: ICD-10-CM

## 2024-01-18 DIAGNOSIS — Q01.9 ENCEPHALOCELE, UNSPECIFIED: ICD-10-CM

## 2024-01-18 DIAGNOSIS — R10.9 UNSPECIFIED ABDOMINAL PAIN: ICD-10-CM

## 2024-01-18 PROCEDURE — 99215 OFFICE O/P EST HI 40 MIN: CPT

## 2024-01-22 NOTE — DATA REVIEWED
[de-identified] :  	 ACC: [id number]     EXAM:  CT BRAIN   ORDERED BY: UGO JAVIER  PROCEDURE DATE:  01/07/2024    INTERPRETATION:  STUDY:  CT HEAD  REASON FOR EXAM:   Female, 63 years old.  Headaches;  TECHNIQUE: CT imaging of the brain was performed without the administration of intravenous contrast. Coronal and sagittal images were reformatted from the axial data.  Individualized dose optimization techniques were used for this CT.  COMPARISON:   Noncontrast CT scan dating 5/25/2023.  Findings:  Brain:  Status post high left anterior frontal approach ventriculostomy catheter. Catheter tip terminates within the region of the left lateral ventricle unchanged from the prior exam. Ventricle size is stable compared to the previous exam with no CT evidence of hydrocephalus. No acute intracranial hemorrhage, mass effect or CT evidence of an acute infarct.  Osseous structures:  High left anterior frontal bone angel hole for ventriculostomy catheter. Post left temporal craniotomy as seen previously. No superficial scalp hematomas.  Paranasal sinuses:  Clear.  Mastoid air cells: Right are clear. Left postsurgical changes are stable in appearance.  IMPRESSION:  Left anterior frontal lobe approach ventriculostomy catheter as described above. No hydrocephalus. Ventricular size is stable compared to previous exam.  No acute intracranial hemorrhage, mass effect or CT evidence of an acute infarct.    --- End of Report ---      KAMALJIT BROWN DO; Attending Radiologist This document has been electronically signed. Jan 7 2024  9:12AM

## 2024-01-22 NOTE — HISTORY OF PRESENT ILLNESS
[de-identified] : 63 year old woman with past medical history HTN, HLD, left ear mastoidectomy, cholesteatoma with prior surgeries 2006 and 2016 who presented to [facility] ER on 5/16/23 with c/o gradual onset of left sided headache around left ear with ongoing L otorrhea.  She underwent LEFT craniotomy for repair of tegmen defect and encephalocele and insertion of  shunt performed by Dr. Zeus Ramirez on 5/17/23. On 5/25/23, she underwent subsequent revision of VPS with Dr. Ramirez and Dr. Falcon.  On 6/3/23 she presented to [facility] ED with severe mid abdominal pain. CT abdomen demonstrated mild inflammatory changes at the umbilicus.  Returns today due to headadches and numbness/tingling near shunt at top of head. She had CT head without contrast done on 1/7/24 and comes today to review.   She is establishing care with Dr. Swan as Dr. Ramirez does not participate with her insurance.   TODAY: The patient reports persistent abd pain. Abd soft and non tender when palpated. She does not have rebound tenderness. She reports the pain occurs every day and some positions make it severe. She has burning pain to the left side of her neck when she turns her neck. She denies that eating or foods make her abd pain worse or better. She denies diarrhea or constipation. Certa confirmed at 5. Incison well healed. Denies HA, dizziness, gait changes, memory difficulties, visual changes or chnages in LOC or ability to perform ADL's.

## 2024-01-22 NOTE — PHYSICAL EXAM
[General Appearance - Alert] : alert [General Appearance - Well Nourished] : well nourished [General Appearance - Well Developed] : well developed [General Appearance - Well-Appearing] : healthy appearing [Oriented To Time, Place, And Person] : oriented to person, place, and time [Impaired Insight] : insight and judgment were intact [Affect] : the affect was normal [Mood] : the mood was normal [Memory Recent] : recent memory was not impaired [Memory Remote] : remote memory was not impaired [Person] : oriented to person [Place] : oriented to place [Short Term Intact] : short term memory intact [Remote Intact] : remote memory intact [Registration Intact] : recent registration memory intact [Span Intact] : the attention span was normal [Concentration Intact] : normal concentrating ability [Visual Intact] : visual attention was ~T not ~L decreased [Fluency] : fluency intact [Comprehension] : comprehension intact [Reading] : reading intact [Cranial Nerves Optic (II)] : visual acuity intact bilaterally,  pupils equal round and reactive to light [Cranial Nerves Oculomotor (III)] : extraocular motion intact [Cranial Nerves Trigeminal (V)] : facial sensation intact symmetrically [Cranial Nerves Facial (VII)] : face symmetrical [Cranial Nerves Vestibulocochlear (VIII)] : hearing was intact bilaterally [Cranial Nerves Accessory (XI - Cranial And Spinal)] : head turning and shoulder shrug symmetric [Cranial Nerves Hypoglossal (XII)] : there was no tongue deviation with protrusion [Motor Tone] : muscle tone was normal in all four extremities [Motor Strength] : muscle strength was normal in all four extremities [Abnormal Walk] : normal gait [Balance] : balance was intact [PERRL With Normal Accommodation] : pupils were equal in size, round, reactive to light, with normal accommodation [Extraocular Movements] : extraocular movements were intact [Full Visual Field] : full visual field [Neck Appearance] : the appearance of the neck was normal [] : the neck was supple [Abdomen Soft] : soft [Abdomen Tenderness] : non-tender [Abdomen Mass (___ Cm)] : no abdominal mass palpated [Time] : disoriented to time [Limited Balance] : balance was intact [Past-pointing] : there was no past-pointing [Tremor] : no tremor present

## 2024-01-22 NOTE — ASSESSMENT
[FreeTextEntry1] : I, Dr. Swan, personally performed the evaluation and management (E/M) services for this established patient who presents today with (a) new problem(s)/exacerbation of (an) existing condition(s). That E/M includes conducting the examination, assessing all new/exacerbated conditions, and establishing a new plan of care. Today, my ACP, Malou Short, was here to observe my evaluation and management services for this new problem/exacerbated condition to be followed going forward.  PLAN: - Discussed it's important to r/o GI causes for her persistent and pain such as from gallbladder. She will follow-up with her GI doctor to discuss a w/u for her pain - Abd/Pelvis CT ordered - If no medical GI issue can be ruled in as a cause for her pain, we discussed having a GI surgeon revise/shorten the distal portion of her shunt. - Discussion of placing an entirely new shunt on the opposite side was also had - Patient and family member are not interested in more surgery. They will see medical GI doctor with CT abd/pelvis

## 2024-01-28 ENCOUNTER — APPOINTMENT (OUTPATIENT)
Dept: CT IMAGING | Facility: IMAGING CENTER | Age: 64
End: 2024-01-28

## 2024-02-25 ENCOUNTER — APPOINTMENT (OUTPATIENT)
Dept: CT IMAGING | Facility: IMAGING CENTER | Age: 64
End: 2024-02-25
Payer: COMMERCIAL

## 2024-02-25 ENCOUNTER — RESULT REVIEW (OUTPATIENT)
Age: 64
End: 2024-02-25

## 2024-02-25 ENCOUNTER — OUTPATIENT (OUTPATIENT)
Dept: OUTPATIENT SERVICES | Facility: HOSPITAL | Age: 64
LOS: 1 days | End: 2024-02-25
Payer: COMMERCIAL

## 2024-02-25 DIAGNOSIS — Z90.49 ACQUIRED ABSENCE OF OTHER SPECIFIED PARTS OF DIGESTIVE TRACT: Chronic | ICD-10-CM

## 2024-02-25 DIAGNOSIS — R10.9 UNSPECIFIED ABDOMINAL PAIN: ICD-10-CM

## 2024-02-25 DIAGNOSIS — Z98.890 OTHER SPECIFIED POSTPROCEDURAL STATES: Chronic | ICD-10-CM

## 2024-02-25 PROCEDURE — 74176 CT ABD & PELVIS W/O CONTRAST: CPT

## 2024-02-25 PROCEDURE — 74176 CT ABD & PELVIS W/O CONTRAST: CPT | Mod: 26

## 2024-09-05 ENCOUNTER — OUTPATIENT (OUTPATIENT)
Dept: OUTPATIENT SERVICES | Facility: HOSPITAL | Age: 64
LOS: 1 days | End: 2024-09-05
Payer: COMMERCIAL

## 2024-09-05 ENCOUNTER — APPOINTMENT (OUTPATIENT)
Dept: RADIOLOGY | Facility: IMAGING CENTER | Age: 64
End: 2024-09-05
Payer: COMMERCIAL

## 2024-09-05 DIAGNOSIS — Z90.49 ACQUIRED ABSENCE OF OTHER SPECIFIED PARTS OF DIGESTIVE TRACT: Chronic | ICD-10-CM

## 2024-09-05 DIAGNOSIS — Z98.890 OTHER SPECIFIED POSTPROCEDURAL STATES: Chronic | ICD-10-CM

## 2024-09-05 DIAGNOSIS — G93.2 BENIGN INTRACRANIAL HYPERTENSION: ICD-10-CM

## 2024-09-05 PROCEDURE — 70260 X-RAY EXAM OF SKULL: CPT | Mod: 26

## 2024-09-05 PROCEDURE — 74019 RADEX ABDOMEN 2 VIEWS: CPT | Mod: 26

## 2024-09-05 PROCEDURE — 71046 X-RAY EXAM CHEST 2 VIEWS: CPT | Mod: 26

## 2024-09-05 PROCEDURE — 70260 X-RAY EXAM OF SKULL: CPT

## 2024-09-05 PROCEDURE — 74019 RADEX ABDOMEN 2 VIEWS: CPT

## 2024-09-05 PROCEDURE — 71046 X-RAY EXAM CHEST 2 VIEWS: CPT

## 2024-09-10 ENCOUNTER — APPOINTMENT (OUTPATIENT)
Dept: OPHTHALMOLOGY | Facility: CLINIC | Age: 64
End: 2024-09-10
Payer: COMMERCIAL

## 2024-09-10 ENCOUNTER — NON-APPOINTMENT (OUTPATIENT)
Age: 64
End: 2024-09-10

## 2024-09-10 PROCEDURE — 99214 OFFICE O/P EST MOD 30 MIN: CPT

## 2024-09-10 PROCEDURE — 92083 EXTENDED VISUAL FIELD XM: CPT

## 2024-09-10 PROCEDURE — 92133 CPTRZD OPH DX IMG PST SGM ON: CPT

## 2024-10-17 ENCOUNTER — APPOINTMENT (OUTPATIENT)
Dept: MRI IMAGING | Facility: IMAGING CENTER | Age: 64
End: 2024-10-17
Payer: COMMERCIAL

## 2024-10-17 ENCOUNTER — OUTPATIENT (OUTPATIENT)
Dept: OUTPATIENT SERVICES | Facility: HOSPITAL | Age: 64
LOS: 1 days | End: 2024-10-17
Payer: COMMERCIAL

## 2024-10-17 DIAGNOSIS — Z98.890 OTHER SPECIFIED POSTPROCEDURAL STATES: Chronic | ICD-10-CM

## 2024-10-17 DIAGNOSIS — Z90.49 ACQUIRED ABSENCE OF OTHER SPECIFIED PARTS OF DIGESTIVE TRACT: Chronic | ICD-10-CM

## 2024-10-17 DIAGNOSIS — G93.2 BENIGN INTRACRANIAL HYPERTENSION: ICD-10-CM

## 2024-10-17 PROCEDURE — 70551 MRI BRAIN STEM W/O DYE: CPT | Mod: 26

## 2024-10-17 PROCEDURE — 70551 MRI BRAIN STEM W/O DYE: CPT

## 2024-10-23 NOTE — OCCUPATIONAL THERAPY INITIAL EVALUATION ADULT - HEALTH SCREEN CRITERIA
yes Medical Necessity Information: It is in your best interest to select a reason for this procedure from the list below. All of these items fulfill various CMS LCD requirements except the new and changing color options. Anesthesia Volume In Cc: 0.5 Render Post-Care Instructions In Note?: no Post-Care Instructions: I reviewed with the patient in detail post-care instructions. Patient is to wear sunprotection, and avoid picking at any of the treated lesions. Pt may apply Vaseline to crusted or scabbing areas. Detail Level: Detailed Medical Necessity Clause: This procedure was medically necessary because the lesions that were treated were: Consent: The patient's consent was obtained including but not limited to risks of crusting, scabbing, blistering, scarring, darker or lighter pigmentary change, recurrence, incomplete removal and infection. Treatment Number (Will Not Render If 0): 0

## 2024-12-02 NOTE — DATA REVIEWED
[id number] Concepción Moreno      [id number] Dr Moreno returned call   
[de-identified] : \par \par \par \par ACC: [id number] EXAM: CT TEMPORAL BONES ORDERED BY: REILLY VOGT\par \par PROCEDURE DATE: 03/05/2023\par \par \par \par INTERPRETATION: CT TEMPORAL BONES\par \par Technique: A subcentimeter axial acquisition was performed through the temporal bones and reconstructed at submillimeter thickness and spacing in the axial and coronal planes, and furthermore in oblique planes both parallel and perpendicular to assess the semicircular canals. Each series right and left sides targeted/magnified views.\par \par Contrast: None\par \par Clinical Information: Left hearing loss status post prior surgery done elsewhere. Rule out recurrent cholesteatoma, encephalocele\par \par Prior Studies: Temporal bone CT 08/02/2017 and MRI-IAC 8/28/2017 and most recent being 2/28/2022. Most recent CT is sinus CT from 05/23/2019\par \par LEFT Temporal Bone\par \par * External canal/TM: Again status post CWU mastoidectomy and atticotomy. The bowl is better ventilated compared to 05/23/2019. Tympanic tube is redemonstrated.\par * Middle ear/ossicles: Persistent opacification is seen anteriorly at the epitympanum and mesotympanum level and widening the station tube orifice. There is grossly unchanged and within the increased bony erosion. Prior MRI showed similar tissue without diffusion restriction to imply cholesteatoma and again favoring chronic inflammatory sequela without cholesteatoma. No visible ossicular erosion or displacement.\par * Temporal bone cortices and Tegmen: Sites of thinning are similar to the 2017 study with defect contiguous with the geniculate fossa measuring 5 mm transverse and seen as small meningocele.\par * Oval/round windows: Both opacified\par * Inner ear: Otic capsule preserved in density, without findings of otosclerosis or canal dehiscence. The internal auditory canal and vestibular aqueduct are grossly normal in size\par * Facial nerve: Enlarged appearance of the canal to geniculate level is grossly unchanged from prior CT imaging. The distal tympanic and mastoid course appears intact.\par * ICA/IJV: No aberrance or dehiscence to the tympanic cavity or mastoid\par \par \par RIGHT Temporal Bone\par \par * External canal/TM: No abnormal soft tissue or bony erosion.\par * Mastoid cavity: Well aerated.\par * Temporal bone cortices and Tegmen: Multifocal thinning sites unchanged.\par * Middle ear/ossicles: Well aerated. Ossicular chain intact without displacement or erosion.\par * Oval/round windows: Ventilated and nonstenotic.\par * Inner ear: Otic capsule preserved in density, without findings of otosclerosis or canal dehiscence. The internal auditory canal and vestibular aqueduct are grossly normal in size\par * Facial nerve: Normal in course and caliber\par * ICA/IJV: No aberrance or dehiscence to the tympanic cavity or mastoid\par \par \par SOFT TISSUES: No gross abnormality superficial to the mastoids, external ear or parotid region.\par \par BONY WINDOW: Central skull base is unremarkable and the included paranasal sinuses are free of acute disease.\par \par \par IMPRESSION:\par \par A.S.\par \par Persistent though improved middle ear opacification comparing to 2017 with TM tube in place. 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

## 2025-05-09 ENCOUNTER — APPOINTMENT (OUTPATIENT)
Dept: CT IMAGING | Facility: IMAGING CENTER | Age: 65
End: 2025-05-09
Payer: COMMERCIAL

## 2025-05-09 ENCOUNTER — OUTPATIENT (OUTPATIENT)
Dept: OUTPATIENT SERVICES | Facility: HOSPITAL | Age: 65
LOS: 1 days | End: 2025-05-09
Payer: COMMERCIAL

## 2025-05-09 DIAGNOSIS — Z98.890 OTHER SPECIFIED POSTPROCEDURAL STATES: Chronic | ICD-10-CM

## 2025-05-09 DIAGNOSIS — Z00.8 ENCOUNTER FOR OTHER GENERAL EXAMINATION: ICD-10-CM

## 2025-05-09 PROCEDURE — 70450 CT HEAD/BRAIN W/O DYE: CPT | Mod: 26

## 2025-05-09 PROCEDURE — 70450 CT HEAD/BRAIN W/O DYE: CPT

## (undated) DEVICE — ARACHNOID BACKCUTTING LONG HANDLE 8"

## (undated) DEVICE — AESCULAP SCALPFIX 10 CLIPS

## (undated) DEVICE — DRAPE MAYO STAND 30"

## (undated) DEVICE — BUR NSK CARBIDE SMALL 5MM

## (undated) DEVICE — SYR LUER LOK 3CC

## (undated) DEVICE — LONE STAR ELASTIC STAY HOOK 12MM BLUNT

## (undated) DEVICE — RUBBERBAND STRL LTX FR 200/CA 3X1/8IN

## (undated) DEVICE — SUT SILK 2-0 30" SH

## (undated) DEVICE — GLV 7.5 SENSICARE W ALOE

## (undated) DEVICE — SUT VICRYL 0 18" CT-2 (POP-OFF)

## (undated) DEVICE — BUR NSK CARBIDE SMALL 3MM

## (undated) DEVICE — DRAPE SPLIT SHEET 77" X 120"

## (undated) DEVICE — STAPLER SKIN PROXIMATE

## (undated) DEVICE — DRAPE INSTRUMENT POUCH 6.75" X 11"

## (undated) DEVICE — BUR STRYKER DIAMOND ROUND 5MM

## (undated) DEVICE — BUR STRYKER FLUTED ROUND SOFT TOUCH 5MM

## (undated) DEVICE — SUT SILK 2-0 30" PSL

## (undated) DEVICE — CODMAN PERFORATOR 14MM (BLUE)

## (undated) DEVICE — DRAPE 3/4 SHEET 52X76"

## (undated) DEVICE — PACK CRANIOTOMY LNX SURGICOUNT

## (undated) DEVICE — SPONGE SURGICAL STRIP 1" X 6"

## (undated) DEVICE — Device

## (undated) DEVICE — NDL SPINAL 18G X 3.5" (PINK)

## (undated) DEVICE — LUBRICATING JELLY ONESHOT 1.25OZ

## (undated) DEVICE — MARKING PEN W RULER

## (undated) DEVICE — BIPOLAR FORCEP KOGENT IRRIGATING STRAIGHT 0.5MM X 7" DISP

## (undated) DEVICE — BLADE SCALPEL SAFETY #11 WITH PLASTIC GREEN HANDLE

## (undated) DEVICE — DRAPE SURGICAL #1010

## (undated) DEVICE — VENODYNE/SCD SLEEVE CALF MEDIUM

## (undated) DEVICE — GLV 7.5 PROTEXIS (WHITE)

## (undated) DEVICE — SUT NYLON 3-0 18" PS-2

## (undated) DEVICE — SUT BOOT STANDARD (ORIGINAL YELLOW) 5 PAIR

## (undated) DEVICE — DRSG MASTISOL

## (undated) DEVICE — DRSG STERISTRIPS 0.5 X 4"

## (undated) DEVICE — ARACHNOID BACKCUTTING SUPERFICIAL HANDLE 5"

## (undated) DEVICE — TROCAR COVIDIEN VERSAONE FIXATION CANNULA 5MM

## (undated) DEVICE — SUT VICRYL PLUS 2-0 18" CT-2 (POP-OFF)

## (undated) DEVICE — SPONGE SURGICAL STRIP 1/2 X 6"

## (undated) DEVICE — BIPOLAR FORCEP SYMMETRY BAYONET 7" X 1.5MM SMOOTH (SILVER)

## (undated) DEVICE — DRAPE MICROSCOPE EXOSCOPE 12" X 79"

## (undated) DEVICE — SUT SILK 2-0 18" TIES

## (undated) DEVICE — SUT NUROLON 4-0 8-18" TF (POP-OFF)

## (undated) DEVICE — TUBING SUCTION 20FT

## (undated) DEVICE — SUT VICRYL 0 27" UR-6

## (undated) DEVICE — DRAPE LIGHT HANDLE COVER (GREEN)

## (undated) DEVICE — FOLEY TRAY 16FR 5CC LF UMETER CLOSED

## (undated) DEVICE — PACK SPINE

## (undated) DEVICE — SPONGE SURGICAL STRIP 1/4 X 6"

## (undated) DEVICE — BUR ROUTER MED

## (undated) DEVICE — DRSG DERMABOND 0.7ML

## (undated) DEVICE — SUT MONOCRYL 4-0 27" PS-2 UNDYED

## (undated) DEVICE — TROCAR COVIDIEN VERSAPORT BLADELESS OPTICAL 5MM STANDARD

## (undated) DEVICE — BUR GILLEN SURGICAL ACORN SHORT 7.5MM

## (undated) DEVICE — GLV 8 PROTEXIS (WHITE)

## (undated) DEVICE — BUR STRYKER DIAMOND ROUND 4MM

## (undated) DEVICE — DRAIN RESERVOIR FOR JACKSON PRATT 100CC CARDINAL

## (undated) DEVICE — ADAPTER LUER STUB 15G

## (undated) DEVICE — GLV 8.5 PROTEXIS (WHITE)

## (undated) DEVICE — DRAPE TOWEL BLUE 17" X 24"

## (undated) DEVICE — PACK UPPER BODY

## (undated) DEVICE — BUR STRYKER DIAMOND ROUND 3MM

## (undated) DEVICE — TUBING STRYKER PNEUMOCLEAR HIGH FLOW

## (undated) DEVICE — SUT VICRYL 2-0 18" MO-6 (POP-OFF)

## (undated) DEVICE — BUR STRYKER FLUTED ROUND SOFT TOUCH 3MM

## (undated) DEVICE — TIP METZENBAUM SCISSOR MONOPOLAR ENDOCUT (ORANGE)

## (undated) DEVICE — BIPOLAR FORCEP KOGENT IRRIGATING STRAIGHT 0.5MM X 8" DISP

## (undated) DEVICE — BUR STRYKER FLUTED ROUND SOFT TOUCH 4MM

## (undated) DEVICE — STRYKER INSTRUMENT BATTERY

## (undated) DEVICE — WARMING BLANKET FULL UNDERBODY